# Patient Record
Sex: FEMALE | Race: WHITE | NOT HISPANIC OR LATINO | Employment: UNEMPLOYED | URBAN - NONMETROPOLITAN AREA
[De-identification: names, ages, dates, MRNs, and addresses within clinical notes are randomized per-mention and may not be internally consistent; named-entity substitution may affect disease eponyms.]

---

## 2018-04-27 ENCOUNTER — TRANSCRIBE ORDERS (OUTPATIENT)
Dept: ADMINISTRATIVE | Facility: HOSPITAL | Age: 58
End: 2018-04-27

## 2018-04-27 DIAGNOSIS — Z12.31 ENCOUNTER FOR SCREENING MAMMOGRAM FOR MALIGNANT NEOPLASM OF BREAST: Primary | ICD-10-CM

## 2018-04-30 ENCOUNTER — HOSPITAL ENCOUNTER (OUTPATIENT)
Dept: MAMMOGRAPHY | Facility: HOSPITAL | Age: 58
Discharge: HOME | End: 2018-04-30
Admitting: PHYSICIAN ASSISTANT

## 2018-04-30 DIAGNOSIS — Z12.31 ENCOUNTER FOR SCREENING MAMMOGRAM FOR MALIGNANT NEOPLASM OF BREAST: ICD-10-CM

## 2018-04-30 PROCEDURE — 77063 BREAST TOMOSYNTHESIS BI: CPT

## 2018-04-30 PROCEDURE — 77067 SCR MAMMO BI INCL CAD: CPT

## 2020-02-28 ENCOUNTER — OFFICE VISIT (OUTPATIENT)
Dept: OBSTETRICS AND GYNECOLOGY | Facility: CLINIC | Age: 60
End: 2020-02-28

## 2020-02-28 VITALS
WEIGHT: 202 LBS | HEIGHT: 68 IN | SYSTOLIC BLOOD PRESSURE: 118 MMHG | DIASTOLIC BLOOD PRESSURE: 76 MMHG | BODY MASS INDEX: 30.62 KG/M2

## 2020-02-28 DIAGNOSIS — N81.89 PELVIC FLOOR RELAXATION: ICD-10-CM

## 2020-02-28 DIAGNOSIS — Z12.39 ENCOUNTER FOR SCREENING FOR MALIGNANT NEOPLASM OF BREAST: ICD-10-CM

## 2020-02-28 DIAGNOSIS — Z01.419 WELL WOMAN EXAM WITH ROUTINE GYNECOLOGICAL EXAM: Primary | ICD-10-CM

## 2020-02-28 DIAGNOSIS — N95.1 MENOPAUSAL STATE: ICD-10-CM

## 2020-02-28 PROCEDURE — 99386 PREV VISIT NEW AGE 40-64: CPT | Performed by: NURSE PRACTITIONER

## 2020-02-28 RX ORDER — ESTROGENS, CONJUGATED 0.45 MG/1
TABLET, FILM COATED ORAL
COMMUNITY
Start: 2019-12-12 | End: 2020-02-28 | Stop reason: SDUPTHER

## 2020-02-28 RX ORDER — DULOXETIN HYDROCHLORIDE 60 MG/1
CAPSULE, DELAYED RELEASE ORAL
COMMUNITY
Start: 2020-01-07

## 2020-02-28 RX ORDER — MELOXICAM 15 MG/1
TABLET ORAL
COMMUNITY
Start: 2019-12-11 | End: 2022-03-21

## 2020-02-28 RX ORDER — ESTROGENS, CONJUGATED 0.45 MG/1
0.45 TABLET, FILM COATED ORAL DAILY
Qty: 90 TABLET | Refills: 3 | Status: SHIPPED | OUTPATIENT
Start: 2020-02-28 | End: 2022-03-21

## 2020-02-28 RX ORDER — ROPINIROLE 1 MG/1
TABLET, FILM COATED ORAL
COMMUNITY
Start: 2019-12-11

## 2020-02-28 NOTE — PROGRESS NOTES
Subjective   Emili Schmitt is a 59 y.o. female  YOB: 1960        Chief Complaint   Patient presents with   • Gynecologic Exam     New patient here for yearly exam. Last yearly was done about 10 years ago. Patient had a TVH, unsure on if she has her tubes, due to hemmoraging after delivary. Last mammo was done at [facility] on 04/30/18 and was normal. Patient needs order sent to [facility]. Patient takes Premarin 0.45mg and does well with it, needs refills to her pharmacy. Patient has c/o of possible prolapse.        Gynecologic Exam   The patient's pertinent negatives include no pelvic pain. Pertinent negatives include no abdominal pain, back pain, constipation, diarrhea, dysuria, fever, frequency, hematuria, nausea, rash, sore throat, urgency or vomiting.       The following portions of the patient's history were reviewed and updated as appropriate: allergies, current medications, past family history, past medical history, past social history, past surgical history and problem list.    No Known Allergies    Past Medical History:   Diagnosis Date   • Arthritis    • Interstitial cystitis    • Macular dystrophy    • Neuropathy    • Restless leg syndrome        Family History   Problem Relation Age of Onset   • Breast cancer Neg Hx    • Ovarian cancer Neg Hx    • Uterine cancer Neg Hx    • Colon cancer Neg Hx    • Melanoma Neg Hx        Social History     Socioeconomic History   • Marital status:      Spouse name: Not on file   • Number of children: Not on file   • Years of education: Not on file   • Highest education level: Not on file   Tobacco Use   • Smoking status: Current Every Day Smoker     Packs/day: 0.50     Types: Cigarettes   • Smokeless tobacco: Never Used   Substance and Sexual Activity   • Alcohol use: Never     Frequency: Never   • Drug use: Never   • Sexual activity: Yes     Partners: Male     Birth control/protection: Surgical         Current Outpatient Medications:   •  DULoxetine  (CYMBALTA) 60 MG capsule, , Disp: , Rfl:   •  meloxicam (MOBIC) 15 MG tablet, , Disp: , Rfl:   •  PREMARIN 0.45 MG tablet, Take 1 tablet by mouth Daily., Disp: 90 tablet, Rfl: 3  •  rOPINIRole (REQUIP) 1 MG tablet, , Disp: , Rfl:     No LMP recorded. Patient has had a hysterectomy.    Sexual History:         Could not be calculated    Past Surgical History:   Procedure Laterality Date   • APPENDECTOMY     • BREAST CYST ASPIRATION Left    • HERNIA REPAIR     • HYSTERECTOMY      TVH unsure if she has tubes due to hemmorage after birth   • OTHER SURGICAL HISTORY      TIF surgery       Review of Systems   Constitutional: Negative for activity change, appetite change, fatigue, fever, unexpected weight gain and unexpected weight loss.   HENT: Negative for congestion, ear pain, hearing loss, nosebleeds, rhinorrhea, sore throat, tinnitus and trouble swallowing.    Eyes: Negative for blurred vision, pain, discharge, itching and visual disturbance.   Respiratory: Negative for apnea, chest tightness, shortness of breath and wheezing.    Cardiovascular: Negative for chest pain and leg swelling.   Gastrointestinal: Negative for abdominal pain, blood in stool, constipation, diarrhea, nausea, vomiting and GERD.   Endocrine: Negative for heat intolerance, polydipsia and polyuria.   Genitourinary: Negative for breast lump, decreased libido, difficulty urinating, dyspareunia, dysuria, frequency, genital sores, hematuria, menstrual problem, pelvic pain, urgency, urinary incontinence and vaginal pain.   Musculoskeletal: Negative for arthralgias, back pain, joint swelling and myalgias.   Skin: Negative for color change, rash and skin lesions.   Allergic/Immunologic: Negative for environmental allergies, food allergies and immunocompromised state.   Neurological: Negative for dizziness, tremors, seizures, syncope, facial asymmetry, numbness and headache.   Hematological: Negative for adenopathy. Does not bruise/bleed easily.    Psychiatric/Behavioral: Negative for agitation, hallucinations, sleep disturbance, suicidal ideas and depressed mood. The patient is not nervous/anxious.        Objective   Physical Exam   Constitutional: She is oriented to person, place, and time. She appears well-developed and well-nourished.   HENT:   Head: Normocephalic and atraumatic.   Right Ear: External ear normal.   Left Ear: External ear normal.   Eyes: Conjunctivae and EOM are normal. Right eye exhibits no discharge. Left eye exhibits no discharge. No scleral icterus.   Neck: Normal range of motion. Neck supple. Carotid bruit is not present. No thyromegaly present.   Cardiovascular: Regular rhythm and normal heart sounds.   No murmur heard.  Pulmonary/Chest: Effort normal and breath sounds normal. No respiratory distress. Right breast exhibits no inverted nipple, no mass, no nipple discharge, no skin change and no tenderness. Left breast exhibits no inverted nipple, no mass, no nipple discharge, no skin change and no tenderness. No breast swelling, tenderness, discharge or bleeding. Breasts are symmetrical.   Abdominal: Soft. Bowel sounds are normal. She exhibits no distension and no mass. There is no tenderness. There is no guarding. No hernia. Hernia confirmed negative in the right inguinal area and confirmed negative in the left inguinal area.   Genitourinary: Vagina normal. Rectal exam shows no mass. No breast swelling, tenderness, discharge or bleeding. There is no rash, tenderness, lesion or injury on the right labia. There is no rash, tenderness, lesion or injury on the left labia. No erythema or bleeding in the vagina. No signs of injury around the vagina. No vaginal discharge found.   Genitourinary Comments: Cervix, Uterus and Adnexa are surgically absent.  Urethra and urethral meatus normal  Bladder, normal no prolapse  Perineum and anus examined and without lesions   Musculoskeletal: Normal range of motion. She exhibits no edema or  "tenderness.   Lymphadenopathy:        Head (right side): No submental, no submandibular, no tonsillar, no preauricular, no posterior auricular and no occipital adenopathy present.        Head (left side): No submental, no submandibular, no tonsillar, no preauricular, no posterior auricular and no occipital adenopathy present.     She has no cervical adenopathy.        Right cervical: No superficial cervical, no deep cervical and no posterior cervical adenopathy present.       Left cervical: No superficial cervical, no deep cervical and no posterior cervical adenopathy present.     She has no axillary adenopathy.        Right: No inguinal adenopathy present.        Left: No inguinal adenopathy present.   Neurological: She is alert and oriented to person, place, and time. She exhibits normal muscle tone. Coordination normal.   Skin: Skin is warm and dry. No bruising and no rash noted. No erythema.   Psychiatric: She has a normal mood and affect. Her behavior is normal. Judgment and thought content normal.   Nursing note and vitals reviewed.        Vitals:    02/28/20 0854   BP: 118/76   Weight: 91.6 kg (202 lb)   Height: 172.7 cm (68\")       Emili was seen today for gynecologic exam.    Diagnoses and all orders for this visit:    Well woman exam with routine gynecological exam  Comments:  Normal well woman exam.  Mammogram ordered.    Encounter for screening for malignant neoplasm of breast  Comments:  Mammogram ordered.  Orders:  -     Mammo Screening Digital Tomosynthesis Bilateral With CAD    Menopausal state  Comments:  Patient does well on Premarin 0.45 mg daily and wants to remain on it.  Refill sent to pharmacy.  Orders:  -     PREMARIN 0.45 MG tablet; Take 1 tablet by mouth Daily.    Pelvic floor relaxation  Comments:  Patient reports she thinks she might be experiencing prolapse.  History of TVH.  No significant prolapse but did discuss ways to strengthen pelvic floor.  Discussed vaginal weighted " balls-patient wants to try.  Thoroughly discussed use.  RTO for yearly or sooner as needed.          Patient's Body mass index is 30.71 kg/m². BMI is above normal parameters. Recommendations include: exercise counseling and nutrition counseling.             Non-Smoker    MyChart Instructions Given

## 2022-03-18 DIAGNOSIS — M25.552 LEFT HIP PAIN: Primary | ICD-10-CM

## 2022-03-21 ENCOUNTER — OFFICE VISIT (OUTPATIENT)
Dept: ORTHOPEDIC SURGERY | Facility: CLINIC | Age: 62
End: 2022-03-21

## 2022-03-21 VITALS — BODY MASS INDEX: 31.98 KG/M2 | HEIGHT: 68 IN | WEIGHT: 211 LBS

## 2022-03-21 DIAGNOSIS — M70.62 TROCHANTERIC BURSITIS OF LEFT HIP: ICD-10-CM

## 2022-03-21 DIAGNOSIS — R26.9 GAIT DIFFICULTY: ICD-10-CM

## 2022-03-21 DIAGNOSIS — M25.552 LEFT HIP PAIN: Primary | ICD-10-CM

## 2022-03-21 DIAGNOSIS — M54.32 SCIATICA OF LEFT SIDE: ICD-10-CM
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
## 2022-03-21 DIAGNOSIS — Z78.0 POST-MENOPAUSAL: ICD-10-CM

## 2022-03-21 DIAGNOSIS — Z13.820 OSTEOPOROSIS SCREENING: ICD-10-CM

## 2022-03-21 PROCEDURE — 20610 DRAIN/INJ JOINT/BURSA W/O US: CPT | Performed by: NURSE PRACTITIONER

## 2022-03-21 PROCEDURE — 99214 OFFICE O/P EST MOD 30 MIN: CPT | Performed by: NURSE PRACTITIONER

## 2022-03-21 PROCEDURE — 96372 THER/PROPH/DIAG INJ SC/IM: CPT | Performed by: NURSE PRACTITIONER

## 2022-03-21 RX ORDER — KETOROLAC TROMETHAMINE 30 MG/ML
60 INJECTION, SOLUTION INTRAMUSCULAR; INTRAVENOUS ONCE
Status: COMPLETED | OUTPATIENT
Start: 2022-03-21 | End: 2022-03-21

## 2022-03-21 RX ORDER — METOPROLOL TARTRATE 50 MG/1
50 TABLET, FILM COATED ORAL 2 TIMES DAILY
COMMUNITY

## 2022-03-21 RX ORDER — DICLOFENAC SODIUM 75 MG/1
75 TABLET, DELAYED RELEASE ORAL 2 TIMES DAILY
Qty: 60 TABLET | Refills: 1 | Status: SHIPPED | OUTPATIENT
Start: 2022-03-21

## 2022-03-21 RX ORDER — ESCITALOPRAM OXALATE 10 MG/1
10 TABLET ORAL DAILY
COMMUNITY

## 2022-03-21 RX ADMIN — LIDOCAINE HYDROCHLORIDE 2 ML: 10 INJECTION, SOLUTION EPIDURAL; INFILTRATION; INTRACAUDAL; PERINEURAL at 09:42

## 2022-03-21 RX ADMIN — TRIAMCINOLONE ACETONIDE 40 MG: 40 INJECTION, SUSPENSION INTRA-ARTICULAR; INTRAMUSCULAR at 09:42

## 2022-03-21 RX ADMIN — KETOROLAC TROMETHAMINE 60 MG: 30 INJECTION, SOLUTION INTRAMUSCULAR; INTRAVENOUS at 09:43

## 2022-03-21 NOTE — PROGRESS NOTES
Emili Schmitt is a 61 y.o. female   Primary provider:  Remedios Qiu APRN       Chief Complaint   Patient presents with   • Left Hip - Hip Pain       HISTORY OF PRESENT ILLNESS: This 61-year-old female patient presents today for left hip pain.  The patient reports she fell over her coat on 3/6/2022 landing on her left side.  The patient reports her pain starts in her left groin and radiates around to the back of her groin.  The patient reports her pain is worse with internal rotation.  The patient reports her pain has not gotten any better since falling approximately 2 weeks ago.  She also reports her pain is in her left glutes and radiates down her left leg.  The patient describes her pain is moderate, constant, grinding and aching.  The pain is worse with sitting, walking and rolling over in her sleep.  Patient reports her pain is interrupting her sleep and daily activities.  The patient denies numbness or tingling.  Patient denies a history of back pain.    Hip Pain   Incident onset: 3/6/2022. The injury mechanism was a fall. The pain is present in the left hip. The quality of the pain is described as aching (grinding. ). The pain is at a severity of 4/10. The pain is moderate. The pain has been worsening since onset. Exacerbated by: sitting, walking.  Treatments tried: xrays done today.         CONCURRENT MEDICAL HISTORY:    Past Medical History:   Diagnosis Date   • Arthritis    • Interstitial cystitis    • Macular dystrophy    • Neuropathy    • Restless leg syndrome        No Known Allergies      Current Outpatient Medications:   •  DULoxetine (CYMBALTA) 60 MG capsule, , Disp: , Rfl:   •  escitalopram (LEXAPRO) 10 MG tablet, Take 10 mg by mouth Daily., Disp: , Rfl:   •  metoprolol tartrate (LOPRESSOR) 50 MG tablet, Take 50 mg by mouth 2 (Two) Times a Day., Disp: , Rfl:   •  rOPINIRole (REQUIP) 1 MG tablet, , Disp: , Rfl:   •  diclofenac (VOLTAREN) 75 MG EC tablet, Take 1 tablet by mouth 2 (Two)  "Times a Day., Disp: 60 tablet, Rfl: 1    Past Surgical History:   Procedure Laterality Date   • APPENDECTOMY     • BREAST CYST ASPIRATION Left    • HERNIA REPAIR     • HYSTERECTOMY      TVH unsure if she has tubes due to hemmorage after birth   • OTHER SURGICAL HISTORY      TIF surgery       Family History   Problem Relation Age of Onset   • Breast cancer Neg Hx    • Ovarian cancer Neg Hx    • Uterine cancer Neg Hx    • Colon cancer Neg Hx    • Melanoma Neg Hx        Social History     Socioeconomic History   • Marital status:    Tobacco Use   • Smoking status: Current Every Day Smoker     Packs/day: 1.00     Types: Cigarettes   • Smokeless tobacco: Never Used   Substance and Sexual Activity   • Alcohol use: Never   • Drug use: Never   • Sexual activity: Yes     Partners: Male     Birth control/protection: Surgical        Review of Systems   Constitutional: Negative.    HENT: Negative.    Eyes: Negative.    Respiratory: Negative.    Cardiovascular: Negative.    Gastrointestinal: Negative.    Endocrine: Negative.    Genitourinary: Negative.    Musculoskeletal: Negative.    Skin: Negative.    Allergic/Immunologic: Negative.    Neurological: Negative.    Hematological: Negative.    Psychiatric/Behavioral: Negative.        PHYSICAL EXAMINATION:       Ht 172.7 cm (68\")   Wt 95.7 kg (211 lb)   BMI 32.08 kg/m²     Physical Exam    GAIT:     []  Normal  []  Antalgic    Assistive device: [x]  None  []  Walker     []  Crutches  []  Cane     []  Wheelchair  []  Stretcher    Left Hip Exam     Tenderness   The patient is experiencing tenderness in the greater trochanter, anterior, posterior and lateral.    Muscle Strength   Abduction: 4/5   Adduction: 4/5   Flexion: 4/5     Tests   ELISHA: negative  Fadir:  Positive FADIR test    Other   Erythema: absent  Scars: absent  Sensation: normal  Pulse: present    Comments:  Decent range of motion however pain is elicited with internal and external rotation.  More with internal " "rotation.  The patient reports she \"feels it \"with external rotation.  Pinpoint tenderness noted at the greater trochanteric area.  Mild swelling at this area as well.  No obvious deformity or leg discrepancy.      Back Exam     Tenderness   The patient is experiencing no tenderness (left glute).     Range of Motion   Extension: normal   Flexion: normal     Reflexes   Patellar: normal    Other   Gait: antalgic   Erythema: no back redness  Scars: absent                  XR Spine Lumbar 2 or 3 View    Result Date: 3/21/2022  Narrative: INDICATION: fall, pain, M25.552 Pain in left hip W01.0XXA Sciatica, left side EXAMINATION/TECHNIQUE: X-RAY -  XR LUMBAR SPINE 2-3 VIEWS COMPARISON: None. ____________________________________________ FINDINGS:  VERTEBRAE: Vertebral body heights are normal.  There is no evidence of fracture.  Alignment is normal.  There is no evidence of spondylolisthesis.  DISCS AND FACET JOINTS: There is mild multilevel degenerative disc disease with marginal osteophytes. There is moderate facet arthropathy at L4-5 and L5-S1. INCLUDED ABDOMEN: Included bowel gas pattern is non-obstructive.     Impression: Mild degenerative changes with normal alignment and no evidence of fracture Electronically signed by:  Preet Eagle MD  3/21/2022 4:35 PM CDT Workstation: [id number]    XR Hip With or Without Pelvis 2 - 3 View Left    Result Date: 3/21/2022  Narrative: Comparison: None Indication: Left hip pain Findings: AP view the pelvis and two views the left hip are obtained. There is normal alignment. No fracture or dislocation. Mild joint space narrowing at the hip.     Impression: Impression: Left hip osteoarthritis Electronically signed by:  Manny Worley MD  3/21/2022 10:22 AM CDT Workstation: [id number]          ASSESSMENT:    Diagnoses and all orders for this visit:    Left hip pain  -     Miscellaneous DME  -     XR Spine Lumbar 2 or 3 View  -     Large Joint Arthrocentesis: L greater trochanteric bursa  -     " ketorolac (TORADOL) injection 60 mg  -     diclofenac (VOLTAREN) 75 MG EC tablet; Take 1 tablet by mouth 2 (Two) Times a Day.  -     Ambulatory Referral to Physical Therapy Evaluate and treat    Trochanteric bursitis of left hip  -     Large Joint Arthrocentesis: L greater trochanteric bursa  -     ketorolac (TORADOL) injection 60 mg  -     diclofenac (VOLTAREN) 75 MG EC tablet; Take 1 tablet by mouth 2 (Two) Times a Day.  -     Ambulatory Referral to Physical Therapy Evaluate and treat    Sciatica of left side  -     Miscellaneous DME  -     XR Spine Lumbar 2 or 3 View  -     ketorolac (TORADOL) injection 60 mg  -     diclofenac (VOLTAREN) 75 MG EC tablet; Take 1 tablet by mouth 2 (Two) Times a Day.  -     Ambulatory Referral to Physical Therapy Evaluate and treat    Gait difficulty  -     Miscellaneous DME  -     XR Spine Lumbar 2 or 3 View  -     ketorolac (TORADOL) injection 60 mg  -     diclofenac (VOLTAREN) 75 MG EC tablet; Take 1 tablet by mouth 2 (Two) Times a Day.  -     Ambulatory Referral to Physical Therapy Evaluate and treat    Fall from slip, trip, or stumble, initial encounter  -     Miscellaneous DME  -     XR Spine Lumbar 2 or 3 View  -     Ambulatory Referral to Physical Therapy Evaluate and treat    Post-menopausal  -     DEXA Bone Density Axial; Future    Osteoporosis screening  -     DEXA Bone Density Axial; Future    Other orders  -     escitalopram (LEXAPRO) 10 MG tablet; Take 10 mg by mouth Daily.  -     metoprolol tartrate (LOPRESSOR) 50 MG tablet; Take 50 mg by mouth 2 (Two) Times a Day.          PLAN  Large Joint Arthrocentesis: L greater trochanteric bursa  Date/Time: 3/21/2022 9:42 AM  Consent given by: patient  Timeout: Immediately prior to procedure a time out was called to verify the correct patient, procedure, equipment, support staff and site/side marked as required   Supporting Documentation  Indications: pain   Procedure Details  Location: hip - L greater trochanteric  bursa  Needle size: 22 G  Medications administered: 40 mg triamcinolone acetonide 40 MG/ML; 2 mL lidocaine PF 1% 1 %          Reviewed and discussed preliminary results of the left hip and lumbar spine xray. Will notify patient of any new findings we did not discuss that is reported by radiology. Reviewed risk and benefits of a Toradol IM injection, the patient is agreeable to the medication.    Reviewed risk and benefits of a bursa injection to the left greater trochanter.  The patient is agreeable to the kenalog/lidocaine bursa injection.   Provided patient with home exercises and a Thera-Band.  PT referral sent.  Will order MRI if pain does not improve.   Will discuss IA hip injection next visit if pain persist.     Fall from slip, trip, or stumble, initial encounter  -     Miscellaneous DME: Walker  -     XR Spine Lumbar 2 or 3 View  -     Ambulatory Referral to Physical Therapy Evaluate and treat    Post-menopausal, Osteoporosis screening  -     DEXA Bone Density Axial; Future  -  Make follow up appointment with [facility] to discuss results.     Left hip pain  -     Miscellaneous DME: Walker  -     XR Spine Lumbar 2 or 3 View  -     left greater trochanteric injection of Kenalog and lidocaine administered, patient tolerated well with no immediate reaction.  -     ketorolac (TORADOL) injection 60 mg administered, patient tolerated well with no immediate reaction.  -     diclofenac (VOLTAREN) 75 MG EC tablet; Take 1 tablet by mouth 2 (Two) Times a Day.  - Recommend starting a prescription oral NSAID for improved control of pain/inflammation. Meloxicam is prescribed today. Patient is instructed to take the  medication daily. Patient is instructed to take the medication with food to minimize any potential GI upset. Patient is instructed not to take any additional NSAIDs,  such as Ibuprofen or Aleve, while taking Meloxicam. Patient can also take Tylenol as needed for additional pain control.   -      Ambulatory Referral to Physical Therapy Evaluate and treat    Trochanteric bursitis of left hip  -     left greater trochanteric injection of Kenalog and lidocaine administered, patient tolerated well with no immediate reaction.  -     ketorolac (TORADOL) injection 60 mg  -     diclofenac (VOLTAREN) 75 MG EC tablet; Take 1 tablet by mouth 2 (Two) Times a Day. Discussed NSAID use.   -     Ambulatory Referral to Physical Therapy Evaluate and treat    Sciatica of left side  -     Miscellaneous DME: Walker  -     XR Spine Lumbar 2 or 3 View  -     ketorolac (TORADOL) injection 60 mg administered, patient tolerated well with no immediate reaction.  -     diclofenac (VOLTAREN) 75 MG EC tablet; Take 1 tablet by mouth 2 (Two) Times a Day.  -     Ambulatory Referral to Physical Therapy Evaluate and treat    Gait difficulty  -     Miscellaneous DME: Walker.  -     Ambulatory Referral to Physical Therapy Evaluate and treat    Recommend to follow up in 4-5 weeks after PT as started and she has completed a least 3 weeks of PT. Unless symptoms worsen or change, follow up sooner.   All questions and concerns are addressed with understanding noted. They are aware and are in agreement to this plan.    Return in about 4 weeks (around 4/18/2022) for Recheck.    BHAVESH Barger     This document has been electronically signed by BHAVESH Barger on March 23, 2022 12:13 CDT      EMR Dragon/Transciption Disclaimer: Some of this note may be an electronic transcription/translation of spoken language to printed text.  The electronic translation of spoken language may permit erroneous, or at times, nonsensical words or phrases to be inadvertently transcribed. Although I have reviewed the note for such errors, some may still exist.     Time spent of a minimum of 30 minutes including the face to face evaluation, reviewing of medical history and prior medial records, reviewing of diagnostic studies, ordering additional tests,  documentation, patient education and coordination of care.

## 2022-03-23 ENCOUNTER — TELEPHONE (OUTPATIENT)
Dept: ORTHOPEDIC SURGERY | Facility: CLINIC | Age: 62
End: 2022-03-23

## 2022-03-23 RX ORDER — TRIAMCINOLONE ACETONIDE 40 MG/ML
40 INJECTION, SUSPENSION INTRA-ARTICULAR; INTRAMUSCULAR
Status: COMPLETED | OUTPATIENT
Start: 2022-03-21 | End: 2022-03-21

## 2022-03-23 RX ORDER — LIDOCAINE HYDROCHLORIDE 10 MG/ML
2 INJECTION, SOLUTION EPIDURAL; INFILTRATION; INTRACAUDAL; PERINEURAL
Status: COMPLETED | OUTPATIENT
Start: 2022-03-21 | End: 2022-03-21

## 2022-03-23 NOTE — TELEPHONE ENCOUNTER
----- Message from BHAVESH Barger sent at 3/23/2022  9:14 AM CDT -----  Please notify the patient she has some mild degenerative changes in her lumbar spine, with normal alignment and no fractures.   Continue with our plan to participate in PT the next few weeks and follow up with me in about 4 weeks or sooner as needed if symptoms worsen or change.     Thanks.

## 2022-03-23 NOTE — PROGRESS NOTES
Please notify the patient she has some mild degenerative changes in her lumbar spine, with normal alignment and no fractures.   Continue with our plan to participate in PT the next few weeks and follow up with me in about 4 weeks or sooner as needed if symptoms worsen or change.     Thanks.

## 2022-03-31 ENCOUNTER — TELEPHONE (OUTPATIENT)
Dept: ORTHOPEDIC SURGERY | Facility: CLINIC | Age: 62
End: 2022-03-31

## 2022-08-05 ENCOUNTER — HOSPITAL ENCOUNTER (EMERGENCY)
Facility: HOSPITAL | Age: 62
Discharge: HOME | End: 2022-08-05
Admitting: EMERGENCY MEDICINE

## 2022-08-05 VITALS
HEART RATE: 69 BPM | DIASTOLIC BLOOD PRESSURE: 59 MMHG | OXYGEN SATURATION: 99 % | BODY MASS INDEX: 31.93 KG/M2 | WEIGHT: 210 LBS | SYSTOLIC BLOOD PRESSURE: 139 MMHG | TEMPERATURE: 98.7 F | RESPIRATION RATE: 18 BRPM

## 2022-08-05 DIAGNOSIS — H54.61 VISION LOSS OF RIGHT EYE: Primary | ICD-10-CM

## 2022-08-05 PROCEDURE — 99282 EMERGENCY DEPT VISIT SF MDM: CPT

## 2022-08-05 NOTE — ED PROVIDER NOTES
Subjective   61-year-old female presents to the ED with complaint of painful right monocular vision loss.  She has a history of macular dystrophy.  Patient reports onset of vision change around 11 AM, approximately 6 hours prior to arrival.  Patient states she developed a speckled appearance in her central visual field.  She also reports a cobweb type appearance in the lateral aspect of her right eye.  No left abnormalities.  She does endorse pain and photophobia.  No chest pain, shortness of breath, slurred speech, facial droop.  Has a retina specialist but cannot recall his name.      History provided by:  Patient      Review of Systems   All other systems reviewed and are negative.      Past Medical History:   Diagnosis Date   • Arthritis    • Interstitial cystitis    • Macular dystrophy    • Neuropathy    • Restless leg syndrome        No Known Allergies    Past Surgical History:   Procedure Laterality Date   • APPENDECTOMY     • BREAST CYST ASPIRATION Left    • HERNIA REPAIR     • HYSTERECTOMY      [facility] unsure if she has tubes due to hemmorage after birth   • OTHER SURGICAL HISTORY      TIF surgery       Family History   Problem Relation Age of Onset   • Breast cancer Neg Hx    • Ovarian cancer Neg Hx    • Uterine cancer Neg Hx    • Colon cancer Neg Hx    • Melanoma Neg Hx        Social History     Socioeconomic History   • Marital status:    Tobacco Use   • Smoking status: Current Every Day Smoker     Packs/day: 1.00     Types: Cigarettes   • Smokeless tobacco: Never Used   Substance and Sexual Activity   • Alcohol use: Never   • Drug use: Never   • Sexual activity: Yes     Partners: Male     Birth control/protection: Surgical           Objective   Physical Exam  Vitals and nursing note reviewed.   Constitutional:       Appearance: Normal appearance. She is normal weight.   HENT:      Head: Normocephalic and atraumatic.   Eyes:      Extraocular Movements: Extraocular movements intact.       Conjunctiva/sclera: Conjunctivae normal.      Pupils: Pupils are equal, round, and reactive to light.      Comments: No pupillary defects  Visual acuity 2125 right eye, 20/100 left eye, 20/100 both eyes   Skin:     General: Skin is warm and dry.      Capillary Refill: Capillary refill takes less than 2 seconds.   Neurological:      Mental Status: She is alert and oriented to person, place, and time.      Cranial Nerves: No cranial nerve deficit.      Sensory: No sensory deficit.         Procedures           ED Course  ED Course as of 08/05/22 1714   Fri Aug 05, 2022   1711 69-year-old female with mildly painful monocular vision loss right eye.  Does have a history of macular dystrophy.  Onset of symptoms about 6 hours prior to arrival.  She states her vision is getting worse which prompted visit to the ER.    I discussed the case with Dr. Warner, he recommend we discharge her and send her to his office so she can have a retinal exam immediately.  He can always send her back to the ER if he discover some type of branch vessel occlusion.    Otherwise, no unilateral focal neurologic deficits.   [AW]      ED Course User Index  [AW] Rakesh Brothers MD                                           [facility]    Final diagnoses:   Vision loss of right eye       ED Disposition  ED Disposition     ED Disposition   Discharge    Condition   Stable    Comment   --             Brian Warner MD  [address] [place] [address]  [phone]    Today           Medication List      No changes were made to your prescriptions during this visit.          Rakesh Brothers MD  08/05/22 1714

## 2022-08-05 NOTE — ED NOTES
Pt at triage desk states that pcp sent her to see opthomolist at ED.  Nba RN notified Dr. Brothers.  Pt seen at triage stretcher per Dr. Brothers.

## 2023-08-21 ENCOUNTER — HOSPITAL ENCOUNTER (OUTPATIENT)
Facility: HOSPITAL | Age: 63
Discharge: OTHER INSTITUTION | End: 2023-09-13
Attending: INTERNAL MEDICINE | Admitting: INTERNAL MEDICINE
Payer: COMMERCIAL

## 2023-08-21 ENCOUNTER — APPOINTMENT (OUTPATIENT)
Dept: GENERAL RADIOLOGY | Facility: HOSPITAL | Age: 63
End: 2023-08-21
Payer: COMMERCIAL

## 2023-08-21 LAB — GLUCOSE BLDC GLUCOMTR-MCNC: 156 MG/DL (ref 70–130)

## 2023-08-21 PROCEDURE — 63710000001 INSULIN LISPRO (HUMAN) PER 5 UNITS: Performed by: INTERNAL MEDICINE

## 2023-08-21 PROCEDURE — 82948 REAGENT STRIP/BLOOD GLUCOSE: CPT

## 2023-08-21 PROCEDURE — 71045 X-RAY EXAM CHEST 1 VIEW: CPT

## 2023-08-21 RX ORDER — POLYETHYLENE GLYCOL 3350 17 G/17G
17 POWDER, FOR SOLUTION ORAL 2 TIMES DAILY
Status: DISCONTINUED | OUTPATIENT
Start: 2023-08-21 | End: 2023-09-13 | Stop reason: HOSPADM

## 2023-08-21 RX ORDER — GABAPENTIN 100 MG/1
200 CAPSULE ORAL 3 TIMES DAILY
Status: DISCONTINUED | OUTPATIENT
Start: 2023-08-21 | End: 2023-09-13 | Stop reason: HOSPADM

## 2023-08-21 RX ORDER — INSULIN LISPRO 100 [IU]/ML
2-7 INJECTION, SOLUTION INTRAVENOUS; SUBCUTANEOUS
Status: DISCONTINUED | OUTPATIENT
Start: 2023-08-21 | End: 2023-08-23

## 2023-08-21 RX ORDER — METOPROLOL SUCCINATE 50 MG/1
50 TABLET, EXTENDED RELEASE ORAL
Status: DISCONTINUED | OUTPATIENT
Start: 2023-08-22 | End: 2023-09-13 | Stop reason: HOSPADM

## 2023-08-21 RX ORDER — NICOTINE POLACRILEX 4 MG
15 LOZENGE BUCCAL
Status: DISCONTINUED | OUTPATIENT
Start: 2023-08-21 | End: 2023-08-23

## 2023-08-21 RX ORDER — PANTOPRAZOLE SODIUM 40 MG/1
40 TABLET, DELAYED RELEASE ORAL 2 TIMES DAILY
Status: DISCONTINUED | OUTPATIENT
Start: 2023-08-21 | End: 2023-09-13 | Stop reason: HOSPADM

## 2023-08-21 RX ORDER — IPRATROPIUM BROMIDE AND ALBUTEROL SULFATE 2.5; .5 MG/3ML; MG/3ML
3 SOLUTION RESPIRATORY (INHALATION)
Status: DISCONTINUED | OUTPATIENT
Start: 2023-08-21 | End: 2023-09-06

## 2023-08-21 RX ORDER — AMIODARONE HYDROCHLORIDE 200 MG/1
200 TABLET ORAL
Status: DISCONTINUED | OUTPATIENT
Start: 2023-08-22 | End: 2023-09-13 | Stop reason: HOSPADM

## 2023-08-21 RX ORDER — OLANZAPINE 5 MG/1
5 TABLET ORAL 2 TIMES DAILY
Status: DISCONTINUED | OUTPATIENT
Start: 2023-08-21 | End: 2023-08-31

## 2023-08-21 RX ORDER — AMOXICILLIN 250 MG
2 CAPSULE ORAL 2 TIMES DAILY
Status: DISCONTINUED | OUTPATIENT
Start: 2023-08-21 | End: 2023-09-13 | Stop reason: HOSPADM

## 2023-08-21 RX ORDER — HYDRALAZINE HYDROCHLORIDE 20 MG/ML
5 INJECTION INTRAMUSCULAR; INTRAVENOUS EVERY 4 HOURS PRN
Status: DISCONTINUED | OUTPATIENT
Start: 2023-08-21 | End: 2023-09-13 | Stop reason: HOSPADM

## 2023-08-21 RX ORDER — DEXTROSE MONOHYDRATE 25 G/50ML
25 INJECTION, SOLUTION INTRAVENOUS
Status: DISCONTINUED | OUTPATIENT
Start: 2023-08-21 | End: 2023-08-23

## 2023-08-21 RX ORDER — LIDOCAINE 50 MG/G
2 PATCH TOPICAL
Status: DISCONTINUED | OUTPATIENT
Start: 2023-08-22 | End: 2023-09-13 | Stop reason: HOSPADM

## 2023-08-21 RX ORDER — OXYCODONE HYDROCHLORIDE 5 MG/1
5 TABLET ORAL EVERY 6 HOURS PRN
Status: DISPENSED | OUTPATIENT
Start: 2023-08-21 | End: 2023-08-28

## 2023-08-21 RX ORDER — BUSPIRONE HYDROCHLORIDE 5 MG/1
5 TABLET ORAL
Status: DISCONTINUED | OUTPATIENT
Start: 2023-08-21 | End: 2023-09-13 | Stop reason: HOSPADM

## 2023-08-21 RX ORDER — ONDANSETRON 4 MG/1
4 TABLET, FILM COATED ORAL EVERY 6 HOURS PRN
Status: DISCONTINUED | OUTPATIENT
Start: 2023-08-21 | End: 2023-09-13 | Stop reason: HOSPADM

## 2023-08-21 RX ORDER — DULOXETIN HYDROCHLORIDE 60 MG/1
60 CAPSULE, DELAYED RELEASE ORAL DAILY
Status: DISCONTINUED | OUTPATIENT
Start: 2023-08-22 | End: 2023-09-13 | Stop reason: HOSPADM

## 2023-08-21 RX ORDER — SIMETHICONE 80 MG
80 TABLET,CHEWABLE ORAL 4 TIMES DAILY
Status: DISCONTINUED | OUTPATIENT
Start: 2023-08-21 | End: 2023-09-13 | Stop reason: HOSPADM

## 2023-08-21 RX ORDER — ONDANSETRON 2 MG/ML
4 INJECTION INTRAMUSCULAR; INTRAVENOUS EVERY 6 HOURS PRN
Status: DISCONTINUED | OUTPATIENT
Start: 2023-08-21 | End: 2023-09-13 | Stop reason: HOSPADM

## 2023-08-21 RX ORDER — BISACODYL 10 MG
10 SUPPOSITORY, RECTAL RECTAL DAILY PRN
Status: DISCONTINUED | OUTPATIENT
Start: 2023-08-21 | End: 2023-09-13 | Stop reason: HOSPADM

## 2023-08-21 RX ORDER — ACETYLCYSTEINE 200 MG/ML
3 SOLUTION ORAL; RESPIRATORY (INHALATION)
Status: DISCONTINUED | OUTPATIENT
Start: 2023-08-21 | End: 2023-08-31

## 2023-08-22 PROBLEM — Z43.0 ACUTE TRACHEOSTOMY MANAGEMENT: Status: ACTIVE | Noted: 2023-08-22

## 2023-08-22 PROBLEM — J96.21 ACUTE ON CHRONIC RESPIRATORY FAILURE WITH HYPOXIA AND HYPERCAPNIA: Status: ACTIVE | Noted: 2023-08-22

## 2023-08-22 PROBLEM — J96.22 ACUTE ON CHRONIC RESPIRATORY FAILURE WITH HYPOXIA AND HYPERCAPNIA: Status: ACTIVE | Noted: 2023-08-22

## 2023-08-22 LAB
ALBUMIN SERPL-MCNC: 3.3 G/DL (ref 3.5–5.2)
ALBUMIN/GLOB SERPL: 1.2 G/DL
ALP SERPL-CCNC: 65 U/L (ref 39–117)
ALT SERPL W P-5'-P-CCNC: 51 U/L (ref 1–33)
ANION GAP SERPL CALCULATED.3IONS-SCNC: 8 MMOL/L (ref 5–15)
AST SERPL-CCNC: 31 U/L (ref 1–32)
BASOPHILS # BLD AUTO: 0.04 10*3/MM3 (ref 0–0.2)
BASOPHILS NFR BLD AUTO: 0.5 % (ref 0–1.5)
BILIRUB SERPL-MCNC: 0.4 MG/DL (ref 0–1.2)
BUN SERPL-MCNC: 13 MG/DL (ref 8–23)
BUN/CREAT SERPL: 46.4 (ref 7–25)
CALCIUM SPEC-SCNC: 10.1 MG/DL (ref 8.6–10.5)
CHLORIDE SERPL-SCNC: 103 MMOL/L (ref 98–107)
CO2 SERPL-SCNC: 27 MMOL/L (ref 22–29)
CREAT SERPL-MCNC: 0.28 MG/DL (ref 0.57–1)
DEPRECATED RDW RBC AUTO: 55.8 FL (ref 37–54)
EGFRCR SERPLBLD CKD-EPI 2021: 122.9 ML/MIN/1.73
EOSINOPHIL # BLD AUTO: 0.19 10*3/MM3 (ref 0–0.4)
EOSINOPHIL NFR BLD AUTO: 2.6 % (ref 0.3–6.2)
ERYTHROCYTE [DISTWIDTH] IN BLOOD BY AUTOMATED COUNT: 15.8 % (ref 12.3–15.4)
GLOBULIN UR ELPH-MCNC: 2.8 GM/DL
GLUCOSE BLDC GLUCOMTR-MCNC: 103 MG/DL (ref 70–130)
GLUCOSE BLDC GLUCOMTR-MCNC: 122 MG/DL (ref 70–130)
GLUCOSE BLDC GLUCOMTR-MCNC: 128 MG/DL (ref 70–130)
GLUCOSE BLDC GLUCOMTR-MCNC: 230 MG/DL (ref 70–130)
GLUCOSE SERPL-MCNC: 113 MG/DL (ref 65–99)
HCT VFR BLD AUTO: 24.7 % (ref 34–46.6)
HGB BLD-MCNC: 7.3 G/DL (ref 12–15.9)
IMM GRANULOCYTES # BLD AUTO: 0.3 10*3/MM3 (ref 0–0.05)
IMM GRANULOCYTES NFR BLD AUTO: 4.1 % (ref 0–0.5)
LYMPHOCYTES # BLD AUTO: 1.51 10*3/MM3 (ref 0.7–3.1)
LYMPHOCYTES NFR BLD AUTO: 20.7 % (ref 19.6–45.3)
MCH RBC QN AUTO: 29.4 PG (ref 26.6–33)
MCHC RBC AUTO-ENTMCNC: 29.6 G/DL (ref 31.5–35.7)
MCV RBC AUTO: 99.6 FL (ref 79–97)
MONOCYTES # BLD AUTO: 0.46 10*3/MM3 (ref 0.1–0.9)
MONOCYTES NFR BLD AUTO: 6.3 % (ref 5–12)
NEUTROPHILS NFR BLD AUTO: 4.8 10*3/MM3 (ref 1.7–7)
NEUTROPHILS NFR BLD AUTO: 65.8 % (ref 42.7–76)
NRBC BLD AUTO-RTO: 0 /100 WBC (ref 0–0.2)
PLATELET # BLD AUTO: 341 10*3/MM3 (ref 140–450)
PMV BLD AUTO: 10.2 FL (ref 6–12)
POTASSIUM SERPL-SCNC: 4.2 MMOL/L (ref 3.5–5.2)
PREALB SERPL-MCNC: 12 MG/DL (ref 20–40)
PROT SERPL-MCNC: 6.1 G/DL (ref 6–8.5)
RBC # BLD AUTO: 2.48 10*6/MM3 (ref 3.77–5.28)
SODIUM SERPL-SCNC: 138 MMOL/L (ref 136–145)
WBC NRBC COR # BLD: 7.3 10*3/MM3 (ref 3.4–10.8)

## 2023-08-22 PROCEDURE — 85025 COMPLETE CBC W/AUTO DIFF WBC: CPT | Performed by: INTERNAL MEDICINE

## 2023-08-22 PROCEDURE — 92610 EVALUATE SWALLOWING FUNCTION: CPT

## 2023-08-22 PROCEDURE — 97535 SELF CARE MNGMENT TRAINING: CPT

## 2023-08-22 PROCEDURE — 63710000001 INSULIN LISPRO (HUMAN) PER 5 UNITS: Performed by: INTERNAL MEDICINE

## 2023-08-22 PROCEDURE — 97530 THERAPEUTIC ACTIVITIES: CPT | Performed by: PHYSICAL THERAPIST

## 2023-08-22 PROCEDURE — 84134 ASSAY OF PREALBUMIN: CPT | Performed by: INTERNAL MEDICINE

## 2023-08-22 PROCEDURE — 97166 OT EVAL MOD COMPLEX 45 MIN: CPT

## 2023-08-22 PROCEDURE — 97162 PT EVAL MOD COMPLEX 30 MIN: CPT | Performed by: PHYSICAL THERAPIST

## 2023-08-22 PROCEDURE — 82948 REAGENT STRIP/BLOOD GLUCOSE: CPT

## 2023-08-22 PROCEDURE — 99222 1ST HOSP IP/OBS MODERATE 55: CPT | Performed by: OTOLARYNGOLOGY

## 2023-08-22 PROCEDURE — 80053 COMPREHEN METABOLIC PANEL: CPT | Performed by: INTERNAL MEDICINE

## 2023-08-22 RX ORDER — NAPROXEN 250 MG/1
500 TABLET ORAL DAILY
Status: DISCONTINUED | OUTPATIENT
Start: 2023-08-22 | End: 2023-08-23

## 2023-08-22 RX ORDER — ALBUTEROL SULFATE 2.5 MG/3ML
2.5 SOLUTION RESPIRATORY (INHALATION) EVERY 6 HOURS PRN
Status: DISCONTINUED | OUTPATIENT
Start: 2023-08-22 | End: 2023-09-13 | Stop reason: HOSPADM

## 2023-08-22 RX ORDER — METOCLOPRAMIDE 5 MG/1
10 TABLET ORAL 4 TIMES DAILY
Status: DISCONTINUED | OUTPATIENT
Start: 2023-08-22 | End: 2023-08-23

## 2023-08-22 NOTE — CONSULTS
"Adult Nutrition  Assessment/PES    Patient Name:  Emili Schmitt  YOB: 1960  MRN: [id number]  Admit Date:  8/21/2023    Assessment Date:  8/22/2023       Reason for Assessment       Row Name 08/22/23 1429          Reason for Assessment    Reason For Assessment per organizational policy;physician consult;other (see comments)  ltach     Diagnosis infection/sepsis;pulmonary disease;cardiac disease     Identified At Risk by Screening Criteria difficulty chewing/swallowing                    Nutrition/Diet History       Row Name 08/22/23 1430          Nutrition/Diet History    Typical Intake (Food/Fluid/EN/PN) SLP approved soft textures, chopped meats, and thin liquids. Good PO with lunch. Adding Boost Glucose Control all meals for wound healing. Admit 194.9lb. Glu ranges 113-230.     Factors Affecting Nutritional Intake chewing difficulties                    Labs/Tests/Procedures/Meds       Row Name 08/22/23 1430          Labs/Procedures/Meds    Lab Results Reviewed reviewed     Lab Results Comments Glu 113-230        Diagnostic Tests/Procedures    Diagnostic Test/Procedure Reviewed reviewed        Medications    Pertinent Medications Reviewed reviewed     Pertinent Medications Comments See MAR                    Physical Findings       Row Name 08/22/23 1430          Physical Findings    Overall Physical Appearance Trach, BM 8/22, coccyx wound (unstageable), no edema. Tremors noted but no difficulty feeding self.                    Estimated/Assessed Needs - Anthropometrics       Row Name 08/22/23 1431 08/22/23 0900       Anthropometrics    Height -- 172.7 cm (68\")    Weight -- 88.4 kg (194 lb 14.4 oz)    Weight for Calculation 88.4 kg (194 lb 14.4 oz) --       Estimated/Assessed Needs    Additional Documentation Fluid Requirements (Group);[place]-St. Jeor Equation (Group);Protein Requirements (Group) --       [place]-St. Jeor Equation    RMR ([place]-St. Jeor Equation) 1497.56 --    [place]-St. " Hubert Activity Factors 1.2 --    Activity Factors (RashidaSt. Gaspar) 2307.072 --       Protein Requirements    Weight Used For Protein Calculations 63.5 kg (140 lb)  IBW --    Est Protein Requirement Amount (gms/kg) 1.4 gm protein --    Estimated Protein Requirements (gms/day) 88.91 --       Fluid Requirements    Fluid Requirements (mL/day) 1797 --    RDA Method (mL) 1797 --                   Nutrition Prescription Ordered       Row Name 08/22/23 1431          Nutrition Prescription PO    Current PO Diet Soft Texture     Texture Chopped     Fluid Consistency Thin                    Evaluation of Received Nutrient/Fluid Intake       Row Name 08/22/23 1431          Nutrient/Fluid Evaluation    Number of Days Evaluated Other (comment)  admission < 24 hours and diet ordered this morning        Fluid Intake Evaluation    Oral Fluid (mL) --  admission < 24 hours and diet ordered this morning        PO Evaluation    Number of Days PO Intake Evaluated Insufficient Data     % PO Intake admission < 24 hours and diet ordered this morning                       Problem/Interventions:   Problem 1       Row Name 08/22/23 1431          Nutrition Diagnoses Problem 1    Problem 1 Increased Nutrient Needs     Inadequate Intake Type Oral     Macronutrient Kcal     Micronutrient Vitamin;Mineral     Etiology (related to) Medical Diagnosis     Skin Pressure injury;Skin breakdown;Non healing wound     Signs/Symptoms (evidenced by) PO Intake;SLP/Swallow eval  initiating; day one PO 8/22/2023     Swallow eval status Done     Type of SLP Evaluation Bedside                          Intervention Goal       Row Name 08/22/23 1432          Intervention Goal    General Meet nutritional needs for age/condition;Reduce/improve symptoms;Disease management/therapy     PO Tolerate PO;Increase intake;Meet estimated needs     Weight No significant weight loss                    Nutrition Intervention       Row Name 08/22/23 1432          Nutrition  Intervention    RD/Tech Action Follow Tx progress;Care plan reviewd;Encourage intake;Recommend/ordered     Recommended/Ordered Supplement                    Nutrition Prescription       Row Name 08/22/23 1432          Nutrition Prescription PO    PO Prescription Begin/change supplement     Supplement Boost Glucose Control     Supplement Frequency 3 times a day     New PO Prescription Ordered? Yes                    Education/Evaluation       Row Name 08/22/23 1432          Education    Education No discharge needs identified at this time        Monitor/Evaluation    Monitor Per protocol                     Electronically signed by:  Inés Bonilla RDN, AUGUSTUS  08/22/23 14:32 CDT

## 2023-08-22 NOTE — H&P
ANISH Carbajal APRN          Internal Medicine History and Physical      Name: Emili Schmitt  MRN: [id number]     Acct: [id number]  Room: 6/    Admit Date: 8/21/2023  PCP: Remedios Qiu APRN    Chief Complaint:     Need for continued oxygen weaning and rehabilitation efforts    History Obtained From:     chart review and the patient    History of Present Illness:      Emili Schmitt is a  62 y.o.  female who presents with need for continued oxygen weaning and rehabilitation efforts following a recent acute care stay. The patient had been in her usual state of health when she underwent laparoscopic repair of recurrent paraesophageal hernia with incisionless fundoplasty and lysis of adhesions on 7/5/23 at [facility]. Patient returned to ER 10 days post-op with complaints of chest pain radiating to shoulder and associated with increased shortness of breath. She was found to have evidence of mediastinitis with pneumonia and pleural effusion. She underwent left chest tube placement and during EGD on 7/14, patient had esophageal stent placed. She became febrile with worsening respiratory function and required intubation with mechanical ventilation. On 7/19, she transferred to [facility] for higher level of care. Pleural fluid culture returned positive for candida glabrata. A second chest tube was placed on 7/20 for worsening pleural effusion. Patient continued with mechanical ventilation and required TPN for nutrition support. This was eventually transitioned to tube feeding via OG/NGT. Paralytics and sedation were weaned, but patient had decline in respiratory status necessitating continued paralytic and sedative therapy. She developed atrial fibrillation with RVR and was treated with cardizem and amiodarone drips as well as heparin drip that was later transitioned to anticoagulation with Eliquis. Amiodarone was  discontinued after patient developed significant bradycardia.  Due to significant hyperglycemia, she also required insulin drip. She had issues with constipation as well as nausea and vomiting requiring enemas and gastric decompression. She was unable to tolerated sedation and vent liberation and underwent tracheostomy tube placement on 8/8. She was noted to have significant edema to RLE and venous doppler confirmed DVT. Patient stabilized somewhat and chest tubes were discontinued. She developed a large unstageable decubitus ulcer to the coccyx and was seen in consultation by wound care. She tolerated oxygen weaning and is maintaining adequate 02 sats per trach collar. She completed antibiotic and antifungal therapy. She underwent barium swallow that showed no signs of esophageal leak and esophageal stent was removed on 8/21. She transferred to our facility for continued oxygen weaning, rehabilitation efforts and wound care.     Past Medical History:     Past Medical History:   Diagnosis Date    Arthritis     Interstitial cystitis     Macular dystrophy     Neuropathy     Restless leg syndrome         Past Surgical History:     Past Surgical History:   Procedure Laterality Date    APPENDECTOMY      BREAST CYST ASPIRATION Left     HERNIA REPAIR      HYSTERECTOMY      [facility] unsure if she has tubes due to hemmorage after birth    OTHER SURGICAL HISTORY      TIF surgery        Medications Prior to Admission:       Prior to Admission medications    Medication Sig Start Date End Date Taking? Authorizing Provider   diclofenac (VOLTAREN) 75 MG EC tablet Take 1 tablet by mouth 2 (Two) Times a Day. 3/21/22   Heather Maloney APRN   DULoxetine (CYMBALTA) 60 MG capsule  1/7/20   ProviderGodfrey MD   escitalopram (LEXAPRO) 10 MG tablet Take 10 mg by mouth Daily.    Godfrey Vega MD   metoprolol tartrate (LOPRESSOR) 50 MG tablet Take 50 mg by mouth 2 (Two) Times a Day.    Godfrey Vega MD   rOPINIRole  (REQUIP) 1 MG tablet  12/11/19   Provider, Godfrey, MD        Allergies:       Hydrocodone and Tylenol [acetaminophen]    Social History:     Tobacco:    reports that she has been smoking cigarettes. She has been smoking an average of 1 pack per day. She has never used smokeless tobacco.  Alcohol:      reports no history of alcohol use.  Drug Use:  reports no history of drug use.    Family History:     Family History   Problem Relation Age of Onset    Breast cancer Neg Hx     Ovarian cancer Neg Hx     Uterine cancer Neg Hx     Colon cancer Neg Hx     Melanoma Neg Hx        Review of Systems:     Review of Systems   Constitutional: Positive for malaise/fatigue. Negative for chills, decreased appetite, weight gain and weight loss.   HENT:  Negative for congestion, ear discharge, hoarse voice and tinnitus.    Eyes:  Negative for blurred vision, discharge, visual disturbance and visual halos.   Cardiovascular:  Positive for dyspnea on exertion. Negative for chest pain, claudication, irregular heartbeat, leg swelling, orthopnea and paroxysmal nocturnal dyspnea.   Respiratory:  Positive for shortness of breath. Negative for cough, sputum production and wheezing.    Endocrine: Negative for cold intolerance, heat intolerance and polyuria.   Hematologic/Lymphatic: Negative for adenopathy. Does not bruise/bleed easily.   Skin:  Positive for poor wound healing. Negative for dry skin, itching and suspicious lesions.   Musculoskeletal:  Negative for arthritis, back pain, falls, joint pain, muscle weakness and myalgias.   Gastrointestinal:  Negative for abdominal pain, constipation, diarrhea, dysphagia and hematemesis.   Genitourinary:  Negative for bladder incontinence, dysuria and frequency.   Neurological:  Positive for weakness. Negative for aphonia, disturbances in coordination and dizziness.   Psychiatric/Behavioral:  Negative for altered mental status, depression, memory loss and substance abuse. The patient does not  "have insomnia and is not nervous/anxious.      Code Status:    There are no questions and answers to display.       Physical Exam:     Vitals:  Ht 172.7 cm (68\")   Wt 88.4 kg (194 lb 14.4 oz)   BMI 29.63 kg/m²   T 97.6 P 70 R 16 Bp 120/65 Sp02 99% (trach collar)  Physical Exam  Vitals and nursing note reviewed.   Constitutional:       Appearance: Normal appearance.   HENT:      Head: Normocephalic and atraumatic.      Right Ear: External ear normal.      Left Ear: External ear normal.      Nose: Nose normal.      Mouth/Throat:      Mouth: Mucous membranes are moist.      Pharynx: Oropharynx is clear.   Eyes:      Extraocular Movements: Extraocular movements intact.      Conjunctiva/sclera: Conjunctivae normal.      Pupils: Pupils are equal, round, and reactive to light.   Neck:      Comments: Trach to collar with PMV  Cardiovascular:      Rate and Rhythm: Normal rate and regular rhythm.      Pulses: Normal pulses.      Heart sounds: Normal heart sounds.   Pulmonary:      Effort: Pulmonary effort is normal.      Breath sounds: Normal breath sounds.   Abdominal:      General: Bowel sounds are normal.      Palpations: Abdomen is soft.   Musculoskeletal:      Comments: Generalized weakness   Skin:     Comments: Large decubitus to coccyx with large amount of slough covering wound bed  No significant drainage or surrounding erythema   Neurological:      Mental Status: She is alert and oriented to person, place, and time.   Psychiatric:         Mood and Affect: Mood normal.         Behavior: Behavior normal.             Data:     Lab Results (last 7 days)       Procedure Component Value Units Date/Time    POC Glucose Once [[id number]]  (Abnormal) Collected: 08/22/23 1217    Specimen: Blood Updated: 08/22/23 1230     Glucose 230 mg/dL      Comment: : maite Arnold) LisaMeter ID: [id number]       POC Glucose Once [[id number]]  (Normal) Collected: 08/22/23 0729    Specimen: Blood Updated: 08/22/23 0740     " Glucose 122 mg/dL      Comment: : efrain Rivers SethMeter ID: [id number]       Comprehensive Metabolic Panel [[id number]]  (Abnormal) Collected: 08/22/23 0546    Specimen: Blood Updated: 08/22/23 0715     Glucose 113 mg/dL      BUN 13 mg/dL      Creatinine 0.28 mg/dL      Sodium 138 mmol/L      Potassium 4.2 mmol/L      Chloride 103 mmol/L      CO2 27.0 mmol/L      Calcium 10.1 mg/dL      Total Protein 6.1 g/dL      Albumin 3.3 g/dL      ALT (SGPT) 51 U/L      AST (SGOT) 31 U/L      Alkaline Phosphatase 65 U/L      Total Bilirubin 0.4 mg/dL      Globulin 2.8 gm/dL      A/G Ratio 1.2 g/dL      BUN/Creatinine Ratio 46.4     Anion Gap 8.0 mmol/L      eGFR 122.9 mL/min/1.73     Narrative:      GFR Normal >60  Chronic Kidney Disease <60  Kidney Failure <15      CBC & Differential [[id number]]  (Abnormal) Collected: 08/22/23 0546    Specimen: Blood Updated: 08/22/23 0700    Narrative:      The following orders were created for panel order CBC & Differential.  Procedure                               Abnormality         Status                     ---------                               -----------         ------                     CBC Auto Differential[[id number]]        Abnormal            Final result                 Please view results for these tests on the individual orders.    CBC Auto Differential [[id number]]  (Abnormal) Collected: 08/22/23 0546    Specimen: Blood Updated: 08/22/23 0700     WBC 7.30 10*3/mm3      RBC 2.48 10*6/mm3      Hemoglobin 7.3 g/dL      Hematocrit 24.7 %      MCV 99.6 fL      MCH 29.4 pg      MCHC 29.6 g/dL      RDW 15.8 %      RDW-SD 55.8 fl      MPV 10.2 fL      Platelets 341 10*3/mm3      Neutrophil % 65.8 %      Lymphocyte % 20.7 %      Monocyte % 6.3 %      Eosinophil % 2.6 %      Basophil % 0.5 %      Immature Grans % 4.1 %      Neutrophils, Absolute 4.80 10*3/mm3      Lymphocytes, Absolute 1.51 10*3/mm3      Monocytes, Absolute 0.46 10*3/mm3      Eosinophils, Absolute 0.19  10*3/mm3      Basophils, Absolute 0.04 10*3/mm3      Immature Grans, Absolute 0.30 10*3/mm3      nRBC 0.0 /100 WBC     Prealbumin [[id number]] Collected: 08/22/23 0546    Specimen: Blood Updated: 08/22/23 0651    POC Glucose Once [[id number]]  (Abnormal) Collected: 08/21/23 2017    Specimen: Blood Updated: 08/21/23 2029     Glucose 156 mg/dL      Comment: : roberto Christine ID: [id number]             XR Chest 1 View    Result Date: 8/21/2023  Narrative: EXAMINATION: XR CHEST 1 VW-  8/21/2023 7:23 PM CDT  HISTORY: Tracheostomy tube. Hospital admission for long-term acute care.  One x-ray.  Comparison is made with 03/05/2009.  Heart size is within normal limits. A tracheostomy tube is present.  Left diaphragm elevation is more prominent than was seen on the remote comparison study.  There is left greater than right basilar infiltrate. There appears to be mild atelectasis at the right lung base.  The left basilar infiltrate terminated and suggest pneumonia.  There is no pneumothorax.  Summary: 1. Right basilar atelectasis. 2. Left basilar pneumonia suspected.  This report was finalized on 08/21/2023 20:23 by Dr. Manny Franz MD.       Assessment:         * No active hospital problems. *    Past Medical History:   Diagnosis Date    Arthritis     Interstitial cystitis     Macular dystrophy     Neuropathy     Restless leg syndrome        Plan:     Acute hypoxic respiratory failure s/p tracheostomy tube  PAF  Acute RUE DVT  Chronic anticoagulation  Multifactorial anemia  Peripheral neuropathy  DM2 with hyperglycemia not on long term insulin  HTN  Anxiety  GERD  Constipation  Unstageable decubitus ulcer to coccyx  Continue current treatment. Monitor counts. Increase activity. Labs Thursday. Wound care per wound care team. Glycemic control. Continue rate and rhythm control efforts. Oxygen weaning as tolerated. Trach management per ENT.       Electronically signed by BHAVESH Mills on  8/22/2023 at 12:40 CDT     Copy sent to Dr. Qiu, BHAVESH Nice  I have discussed the care of Emili Schmitt, including pertinent history and exam findings, with the nurse practitioner.    I have seen and examined the patient and the key elements of all parts of the encounter have been performed by me.  I agree with the assessment, plan and orders as documented by BHAVESH Sheehan, after I modified the exam findings and the plan of treatments and the final version is my approved version of the assessment.        Electronically signed by Montez Dan MD on 8/23/2023 at 10:38 CDT

## 2023-08-22 NOTE — CONSULTS
[facility] Otolaryngology Head and Neck Surgery  CONSULT  Patient Name: Emili Schmitt  : 1960  MRN: [id number]  Primary Care Physician:  Remedios Qiu APRN  Referring Physician: Montez Dan MD  Date of admission: 2023  Length of Stay: 0  Room: [unfilled]      Subjective    Subjective   History of Present Illness  Chief Complaint/Reason for Consultation: Tracheostomy management  Accompanied by: RT  Emili Schmitt is a 62 y.o. female has been transferred to [facility] for further rehabilitation and tracheostomy management.  Patient has had a complex medical history.  She underwent general surgical GI procedure.  She suffered complications.  She required chest tube for mediastinitis and left chest effusion.  Patient was transferred to [facility] in [place].  She required increased level of care.  Patient was maintained on the ventilator with endotracheal tube.  She underwent tracheostomy on 2023.  She continues to wean from the ventilator.  Her underlying medical conditions continue to improve to the point where she was able to liberate from the ventilator.  She is now transferred to [facility] for further oxygen weaning, tracheostomy management, further rehabilitation.  ENT has been consulted for tracheostomy management.  The patient is awake and alert.  She gives some history.  She is currently maintained with a tracheostomy and Passy-Dylon valve.  She is on trach collar and humidified oxygen.  RT notes that the patient is stable with a trach in position, Passy-Ardmore valve, supplemental oxygen.  Patient is a, chart is reviewed    Review of Systems   Otherwise complete ROS is negative except as mentioned above.    Past Medical History:   Past Medical History:   Diagnosis Date    Arthritis     Interstitial cystitis     Macular dystrophy     Neuropathy     Restless leg syndrome      Past Surgical History:  Past Surgical History:   Procedure Laterality  Date    APPENDECTOMY      BREAST CYST ASPIRATION Left     HERNIA REPAIR      HYSTERECTOMY      [facility] unsure if she has tubes due to hemmorage after birth    OTHER SURGICAL HISTORY      TIF surgery       Family History: Her family history is not on file.   Social History: She  reports that she has been smoking cigarettes. She has been smoking an average of 1 pack per day. She has never used smokeless tobacco. She reports that she does not drink alcohol and does not use drugs.    Home Medications:  DULoxetine, diclofenac, escitalopram, metoprolol tartrate, and rOPINIRole    Allergies:  She is allergic to hydrocodone and tylenol [acetaminophen].    Objective    Objective   Vitals:         Physical Exam  Vitals reviewed.   Constitutional:       General: She is awake. She is not in acute distress.     Appearance: Normal appearance. She is well-developed. She is obese. She is ill-appearing. She is not toxic-appearing.      Interventions: She is intubated.      Comments: Lying in bed, trach in position, Passy-Dylon valve in position, trach collar  Patient appears mildly anxious but stable   HENT:      Head: Normocephalic and atraumatic.      Right Ear: Hearing and external ear normal.      Left Ear: Hearing and external ear normal.      Nose: Mucosal edema present. No nasal tenderness.      Left Nostril: No epistaxis or septal hematoma.      Right Turbinates: Enlarged and swollen.      Left Turbinates: Enlarged and swollen.      Comments: Left nasal mucosa with crusting, old blood present     Mouth/Throat:      Lips: Pink. No lesions.      Mouth: Mucous membranes are dry.      Dentition: Normal dentition. No dental caries or gum lesions.      Tongue: No lesions. Tongue does not deviate from midline.      Palate: No mass and lesions.      Pharynx: Oropharynx is clear. Uvula midline.      Comments: Macroglossia-moderate  Eyes:      General: Lids are normal. Gaze aligned appropriately.      Extraocular Movements: Extraocular  movements intact.      Conjunctiva/sclera: Conjunctivae normal.   Neck:      Trachea: Tracheostomy present.      Comments: TRACHEOSTOMY SITE:    Tracheostomy tube type: Shiley #6 cuffed DIC, flexible shaft    Stoma: Healing appropriately    Secretions: Minimal    Passey-Marquette valve: In place    Voice: Weak  Date placed: 8/8/2023  Date last changed: Never     Pulmonary:      Effort: Pulmonary effort is normal. Tachypnea (Mild) present. No respiratory distress. She is intubated.      Breath sounds: No stridor.      Comments: Trach in position, trach collar  Passy-Dylon valve in position  Musculoskeletal:      Cervical back: No rigidity or crepitus. Decreased range of motion.   Lymphadenopathy:      Cervical: No cervical adenopathy.   Skin:     Findings: No rash.   Neurological:      General: No focal deficit present.      Mental Status: She is alert and oriented to person, place, and time.      Cranial Nerves: Cranial nerves 2-12 are intact. No cranial nerve deficit.   Psychiatric:         Attention and Perception: Attention and perception normal.         Mood and Affect: Mood is anxious.         Behavior: Behavior normal.         Thought Content: Thought content normal.         Judgment: Judgment normal.       Result Review    Result Review:  I have personally reviewed the results from the time of this admission to 8/22/2023 18:29 CDT and agree with these findings:  []  Laboratory  []  Microbiology  []  Radiology  []  EKG/Telemetry   []  Cardiology/Vascular   []  Pathology  []  Old records  []  Other:  Most notable findings include: Anemia decreased albumin  Chest x-ray reviewed, agree with radiology interpretation  Comprehensive Metabolic Panel (08/22/2023 05:46)    CBC & Differential (08/22/2023 05:46)    XR Chest 1 View (08/21/2023 20:12)    Assessment & Plan    Assessment / Plan   Brief Patient Summary:  Emili Schmitt is a 62 y.o. female who was transferred to [facility] with a trach in position.  ENT has been  consulted for evaluation of trach.  Patient currently has a stable trach in position.  I will plan to continue tracheostomy management.  She is being evaluated by other consultants at this time.  Patient appears to be mildly tachypneic.  I feel she will need to trach for a little while longer at this point in time.  She does not need the cuffed trach.  I will plan downsizing soon.  I will continue recurrent evaluation.  Patient is to be evaluated by speech pathology for possible p.o. intake.  She is apparently starting p.o. intake before transfer.    Active Hospital Problems:  Active Hospital Problems    Diagnosis     Acute tracheostomy management     Acute on chronic respiratory failure with hypoxia and hypercapnia            Plan:   Tracheostomy management-the patient is stable trach in position.  I will begin evaluation of her tracheostomy and plan downsizing and weaning as appropriate.  Tachypnea-patient is mildly tachypneic this morning.  I am concerned that she remains somewhat weak following her prolonged ventilator dependence.  This will certainly affect her tracheostomy weaning.  She is currently tolerating her situation.  I will follow closely.  GI complications-Per primary team  Pleural effusion-Per primary team    I discussed the patient's findings and my recommendations with patient, RT.  Following patient as in-patient. Further recommendations will be made based on serial examinations.    Medications/Orders for this encounter: No new medications ordered.  No New orders written.         DVT prophylaxis:  Medical DVT prophylaxis orders are present.    Discharge Planning: Per primary team    Electronically signed by Jef Velázquez Jr, MD, 08/22/23, 8:17 AM CDT.

## 2023-08-23 LAB — GLUCOSE BLDC GLUCOMTR-MCNC: 127 MG/DL (ref 70–130)

## 2023-08-23 PROCEDURE — 97530 THERAPEUTIC ACTIVITIES: CPT

## 2023-08-23 PROCEDURE — 99232 SBSQ HOSP IP/OBS MODERATE 35: CPT | Performed by: OTOLARYNGOLOGY

## 2023-08-23 PROCEDURE — 82948 REAGENT STRIP/BLOOD GLUCOSE: CPT

## 2023-08-23 PROCEDURE — 92526 ORAL FUNCTION THERAPY: CPT

## 2023-08-23 RX ORDER — ALPRAZOLAM 0.25 MG/1
0.25 TABLET ORAL 2 TIMES DAILY PRN
Status: DISCONTINUED | OUTPATIENT
Start: 2023-08-23 | End: 2023-08-30

## 2023-08-23 NOTE — PROGRESS NOTES
[facility] Otolaryngology Head and Neck Surgery  INPATIENT PROGRESS NOTE    Patient Name: Emili Schmitt  : 1960   MRN: [id number]  Date of Admission: 2023  Today's Date: 23  Length of Stay: 0  [unfilled]   Montez Dan MD   Primary Care Physician: Remedios Qiu APRN  Surgical Procedures Since Admission:    Subjective   Subjective   Chief Complaint: Tracheostomy management  HPI   Accompanied by: No one  Since last examined, Emili Schmitt has remained stable with a trach in position.  She is vocalizing well.  She says she is eating.  She says she sat up at the bedside.  Overall, she feels better.  She has had no major difficulties breathing.  RT reports patient requires frequent suction.  She does have some mucous plugging.  She is tolerating her Passy-Dylon valve well.  Patient seen, chart is reviewed    Review of Systems   No change from prior inquiry  All pertinent negatives and positives are as above. All other systems have been reviewed and are negative unless otherwise stated.   Objective   Objective   Vitals:        Physical Exam  Vitals reviewed.   Constitutional:       Appearance: Normal appearance. She is well-developed. She is obese.      Interventions: She is intubated.      Comments: Lying in bed, trach in position, trach collar, Passy-Dylon in position.  Appears much improved over yesterday.   HENT:      Head: Normocephalic and atraumatic.      Right Ear: External ear normal.      Left Ear: External ear normal.      Nose: Nose normal.   Eyes:      General: Lids are normal. Gaze aligned appropriately.      Extraocular Movements: Extraocular movements intact.      Conjunctiva/sclera: Conjunctivae normal.   Pulmonary:      Effort: Pulmonary effort is normal. No respiratory distress. She is intubated.      Breath sounds: No stridor.   Musculoskeletal:      Cervical back: No rigidity or crepitus. Decreased range of motion.   Lymphadenopathy:       Cervical: No cervical adenopathy.   Skin:     Findings: No rash.   Neurological:      General: No focal deficit present.      Mental Status: She is alert and oriented to person, place, and time.      Cranial Nerves: Cranial nerves 2-12 are intact. No cranial nerve deficit.   Psychiatric:         Attention and Perception: Attention and perception normal.         Mood and Affect: Mood and affect normal.         Behavior: Behavior normal. Behavior is cooperative.         Thought Content: Thought content normal.         Cognition and Memory: Cognition normal.         Judgment: Judgment normal.         Result Review    Result Review:  I have personally reviewed the results from the time of this admission to 8/23/2023 08:34 CDT and agree with these findings:  []  Laboratory  []  Microbiology  []  Radiology  []  EKG/Telemetry   []  Cardiology/Vascular   []  Pathology  []  Old records  []  Other:  Most notable findings include: No labs pertinent to ENT this morning  H&P by Marilee Botello APRN (08/22/2023 12:39)    Assessment & Plan   Assessment / Plan   Brief Patient Summary:  Emili Schmitt is a 62 y.o. female who remained stable with trach in position.  She appears much improved this morning.  She appears stronger.  I will continue current trach care.  I will assess for trach downsizing in the near future.  She is taking a regular diet without aspiration at this point in time.    Active Hospital Problems:   Active Hospital Problems    Diagnosis     Acute tracheostomy management     Acute on chronic respiratory failure with hypoxia and hypercapnia      Plan:   Tracheostomy management-the patient is stable trach in position.  I will continue her current trach for now.  I will continue tracheostomy management.  Aspiration-the patient has no evidence of aspiration.  She has passed her swallowing test.  She is beginning p.o. intake.  General debility-the patient is beginning to mobilize.  I await her full  mobilization for increase strength and breathing.  Discussed Plan with patient, RT  Following patient as in-patient. Further recommendations will be made based on serial examinations.    Medications/Orders for this encounter: No new medications ordered.  No New orders written.     Discharge Planning: Per primary team        DVT prophylaxis:  Medical DVT prophylaxis orders are present.     Electronically signed by Jef Velázquez Jr, MD, 08/23/23, 8:34 AM CDT.

## 2023-08-23 NOTE — PROGRESS NOTES
ANISH Carbajal APRN        Internal Medicine Progress Note    8/23/2023   09:56 CDT    Name:  Emili Schmitt  MRN:    [id number]     Acct:     [id number]   Room:  [facility] Day: 0     Admit Date: 8/21/2023  6:54 PM  PCP: Remedios Qiu APRN    Subjective:     C/C: need for continued rehabilitation    Interval History: Status:  stayed the same. Resting in bed. Daughter at bedside. Afebrile. Tolerating trach collar with 28% 02 with PMV. Having some pain to wound. Blood sugars stable.     Review of Systems   Constitutional: Positive for malaise/fatigue. Negative for chills, decreased appetite, weight gain and weight loss.   HENT:  Negative for congestion, ear discharge, hoarse voice and tinnitus.    Eyes:  Negative for blurred vision, discharge, visual disturbance and visual halos.   Cardiovascular:  Positive for dyspnea on exertion. Negative for chest pain, claudication, irregular heartbeat, leg swelling, orthopnea and paroxysmal nocturnal dyspnea.   Respiratory:  Positive for cough and shortness of breath. Negative for sputum production and wheezing.    Endocrine: Negative for cold intolerance, heat intolerance and polyuria.   Hematologic/Lymphatic: Negative for adenopathy. Does not bruise/bleed easily.   Skin:  Positive for poor wound healing. Negative for dry skin, itching and suspicious lesions.   Musculoskeletal:  Negative for arthritis, back pain, falls, joint pain, muscle weakness and myalgias.   Gastrointestinal:  Negative for abdominal pain, constipation, diarrhea, dysphagia and hematemesis.   Genitourinary:  Negative for bladder incontinence, dysuria and frequency.   Neurological:  Positive for weakness. Negative for aphonia, disturbances in coordination and dizziness.   Psychiatric/Behavioral:  Negative for altered mental status, depression, memory loss and substance abuse. The patient does not have insomnia and is not nervous/anxious.         Medications:     Allergies:   Allergies   Allergen Reactions    Hydrocodone Itching     Severe itching    Tylenol [Acetaminophen] Itching       Current Meds:   Current Facility-Administered Medications:     acetylcysteine (MUCOMYST) 20 % nebulizer solution 3 mL, 3 mL, Nebulization, BID - RT, Montez Dan MD    albuterol (PROVENTIL) nebulizer solution 0.083% 2.5 mg/3mL, 2.5 mg, Nebulization, Q6H PRN, Montez Dan MD    amiodarone (PACERONE) tablet 200 mg, 200 mg, Oral, Q24H, Montez Dan MD    apixaban (ELIQUIS) tablet 5 mg, 5 mg, Oral, Q12H, Montez Dan MD    bisacodyl (DULCOLAX) suppository 10 mg, 10 mg, Rectal, Daily PRN, Montez Dan MD    busPIRone (BUSPAR) tablet 5 mg, 5 mg, Oral, TID With Meals, Montez Dan MD    dextrose (D50W) (25 g/50 mL) IV injection 25 g, 25 g, Intravenous, Q15 Min PRN, Montez Dan MD    dextrose (GLUTOSE) oral gel 15 g, 15 g, Oral, Q15 Min PRN, Montez Dan MD    DULoxetine (CYMBALTA) DR capsule 60 mg, 60 mg, Oral, Daily, Montez Dan MD    gabapentin (NEURONTIN) capsule 200 mg, 200 mg, Oral, TID, Montez Dan MD    glucagon (GLUCAGEN) injection 1 mg, 1 mg, Intramuscular, Q15 Min PRN, Montez Dan MD    hydrALAZINE (APRESOLINE) injection 5 mg, 5 mg, Intravenous, Q4H PRN, Montez Dan MD    Insulin Lispro (humaLOG) injection 2-7 Units, 2-7 Units, Subcutaneous, 4x Daily AC & at Bedtime, Montez Dan MD    ipratropium-albuterol (DUO-NEB) nebulizer solution 3 mL, 3 mL, Nebulization, 4x Daily - RT, Montez Dan MD    lidocaine (LIDODERM) 5 % 2 patch, 2 patch, Transdermal, Q24H, Montez Dan MD    metoclopramide (REGLAN) tablet 10 mg, 10 mg, Oral, 4x Daily, Montez Dan MD    metoprolol succinate XL (TOPROL-XL) 24 hr tablet 50 mg, 50 mg, Oral, Q24H, Montez Dan MD    naproxen (NAPROSYN) tablet 500 mg, 500 mg, Oral,  "Daily, Montez Dan MD    OLANZapine (zyPREXA) tablet 5 mg, 5 mg, Oral, BID, Montez Dan MD    ondansetron (ZOFRAN) tablet 4 mg, 4 mg, Oral, Q6H PRN **OR** ondansetron (ZOFRAN) injection 4 mg, 4 mg, Intravenous, Q6H PRN, Montez Dan MD    oxyCODONE (ROXICODONE) immediate release tablet 5 mg, 5 mg, Oral, Q6H PRN, Montez Dan MD    pantoprazole (PROTONIX) EC tablet 40 mg, 40 mg, Oral, BID, Montez Dan MD    polyethylene glycol (MIRALAX) packet 17 g, 17 g, Oral, BID, Montez Dan MD    sennosides-docusate (PERICOLACE) 8.6-50 MG per tablet 2 tablet, 2 tablet, Oral, BID, Montez Dan MD    simethicone (MYLICON) chewable tablet 80 mg, 80 mg, Oral, 4x Daily, Montez Dan MD    sodium hypochlorite (HYSEPT) 0.25 % topical solution, , Topical, Q12H, Montez Dan MD    traZODone (DESYREL) tablet 150 mg, 150 mg, Oral, Nightly, Montez Dan MD    vitamin D3 tablet 5,000 Units, 5,000 Units, Oral, Daily, Montez Dan MD    Data:     Code Status:    There are no questions and answers to display.       Family History   Problem Relation Age of Onset    Breast cancer Neg Hx     Ovarian cancer Neg Hx     Uterine cancer Neg Hx     Colon cancer Neg Hx     Melanoma Neg Hx        Social History     Socioeconomic History    Marital status:    Tobacco Use    Smoking status: Every Day     Packs/day: 1.00     Types: Cigarettes    Smokeless tobacco: Never   Substance and Sexual Activity    Alcohol use: Never    Drug use: Never    Sexual activity: Yes     Partners: Male     Birth control/protection: Surgical       Vitals:  Ht 172.7 cm (68\")   Wt 88.4 kg (194 lb 14.4 oz)   BMI 29.63 kg/m²   T 98.6 P 56 R 19 /68 Sp02 100% (trach collar)            I/O (24Hr):  No intake or output data in the 24 hours ending 08/23/23 0956    Labs and imaging:      Recent Results (from the past 12 hour(s))   POC Glucose Once    Collection " Time: 08/23/23  7:51 AM    Specimen: Blood   Result Value Ref Range    Glucose 127 70 - 130 mg/dL                               Physical Examination:        Physical Exam  Vitals and nursing note reviewed.   Constitutional:       Appearance: Normal appearance.   HENT:      Head: Normocephalic and atraumatic.      Right Ear: External ear normal.      Left Ear: External ear normal.      Nose: Nose normal.      Mouth/Throat:      Mouth: Mucous membranes are moist.      Pharynx: Oropharynx is clear.   Eyes:      Extraocular Movements: Extraocular movements intact.      Conjunctiva/sclera: Conjunctivae normal.      Pupils: Pupils are equal, round, and reactive to light.   Neck:      Comments: Trach to collar with PMV  Cardiovascular:      Rate and Rhythm: Normal rate and regular rhythm.      Pulses: Normal pulses.      Heart sounds: Normal heart sounds.   Pulmonary:      Effort: Pulmonary effort is normal.      Breath sounds: Normal breath sounds.   Abdominal:      General: Bowel sounds are normal.      Palpations: Abdomen is soft.   Musculoskeletal:      Comments: Generalized weakness   Skin:     Comments: Dressing c.d.i   Neurological:      Mental Status: She is alert and oriented to person, place, and time.   Psychiatric:         Mood and Affect: Mood normal.         Behavior: Behavior normal.         Assessment:             Acute tracheostomy management    Acute on chronic respiratory failure with hypoxia and hypercapnia    Past Medical History:   Diagnosis Date    Arthritis     Interstitial cystitis     Macular dystrophy     Neuropathy     Restless leg syndrome         Plan:        Acute hypoxic respiratory failure s/p tracheostomy tube  PAF  Acute RUE DVT  Chronic anticoagulation  Multifactorial anemia  Peripheral neuropathy  DM2 with hyperglycemia not on long term insulin  HTN  Anxiety  GERD  Constipation  Unstageable decubitus ulcer to coccyx  Continue current treatment. Monitor counts. Increase activity. Labs in  am. Oxygen weaning as tolerated. Trach management per ENT. Wound care per wound care team. Aggressive therapies as tolerated. Maintain patient safety.       Electronically signed by BHAVESH Mills on 8/23/2023 at 09:56 CDT     I have discussed the care of Emili Schmitt, including pertinent history and exam findings, with the nurse practitioner.    I have seen and examined the patient and the key elements of all parts of the encounter have been performed by me.  I agree with the assessment, plan and orders as documented by BHAVESH Sheehan, after I modified the exam findings and the plan of treatments and the final version is my approved version of the assessment.        Electronically signed by Montez Dan MD on 8/23/2023 at 10:39 CDT

## 2023-08-23 NOTE — PROGRESS NOTES
[facility] Fall Risk Assessment Note    Name: Emili Schmitt  MRN: [id number]  [facility]: [id number]  Admit Date/Time: 8/21/2023  6:54 PM.    Currently ordered medications associated with an increased risk for fall include:    Scheduled Meds:  amiodarone, 200 mg, Oral, Q24H  busPIRone, 5 mg, Oral, TID With Meals  DULoxetine, 60 mg, Oral, Daily  gabapentin, 200 mg, Oral, TID  metoprolol succinate XL, 50 mg, Oral, Q24H  OLANZapine, 5 mg, Oral, BID  pantoprazole, 40 mg, Oral, BID  polyethylene glycol, 17 g, Oral, BID  senna-docusate sodium, 2 tablet, Oral, BID  traZODone, 150 mg, Oral, Nightly    PRN Meds:    bisacodyl    hydrALAZINE    oxyCODONE    German Mckinney, Pharmacy Intern  08/23/23 15:02 CDT

## 2023-08-24 LAB
ABO GROUP BLD: NORMAL
ANION GAP SERPL CALCULATED.3IONS-SCNC: 7 MMOL/L (ref 5–15)
ANISOCYTOSIS BLD QL: NORMAL
ANTI-E: NORMAL
BASO STIPL COARSE BLD QL SMEAR: NORMAL
BLD GP AB SCN SERPL QL: POSITIVE
BUN SERPL-MCNC: 14 MG/DL (ref 8–23)
BUN/CREAT SERPL: 43.8 (ref 7–25)
CALCIUM SPEC-SCNC: 9.3 MG/DL (ref 8.6–10.5)
CHLORIDE SERPL-SCNC: 105 MMOL/L (ref 98–107)
CO2 SERPL-SCNC: 26 MMOL/L (ref 22–29)
CREAT SERPL-MCNC: 0.32 MG/DL (ref 0.57–1)
CRP SERPL-MCNC: 2.82 MG/DL (ref 0–0.5)
DEPRECATED RDW RBC AUTO: 56.6 FL (ref 37–54)
E AG RBC QL: NEGATIVE
EGFRCR SERPLBLD CKD-EPI 2021: 119 ML/MIN/1.73
EOSINOPHIL # BLD MANUAL: 0.15 10*3/MM3 (ref 0–0.4)
EOSINOPHIL NFR BLD MANUAL: 2 % (ref 0.3–6.2)
ERYTHROCYTE [DISTWIDTH] IN BLOOD BY AUTOMATED COUNT: 16 % (ref 12.3–15.4)
ERYTHROCYTE [SEDIMENTATION RATE] IN BLOOD: 34 MM/HR (ref 0–30)
GLUCOSE SERPL-MCNC: 125 MG/DL (ref 65–99)
HCT VFR BLD AUTO: 22.2 % (ref 34–46.6)
HGB BLD-MCNC: 6.7 G/DL (ref 12–15.9)
LYMPHOCYTES # BLD MANUAL: 1.67 10*3/MM3 (ref 0.7–3.1)
LYMPHOCYTES NFR BLD MANUAL: 5 % (ref 5–12)
MCH RBC QN AUTO: 29.5 PG (ref 26.6–33)
MCHC RBC AUTO-ENTMCNC: 30.2 G/DL (ref 31.5–35.7)
MCV RBC AUTO: 97.8 FL (ref 79–97)
MONOCYTES # BLD: 0.38 10*3/MM3 (ref 0.1–0.9)
NEUTROPHILS # BLD AUTO: 5.39 10*3/MM3 (ref 1.7–7)
NEUTROPHILS NFR BLD MANUAL: 69 % (ref 42.7–76)
NEUTS BAND NFR BLD MANUAL: 2 % (ref 0–5)
PLAT MORPH BLD: NORMAL
PLATELET # BLD AUTO: 335 10*3/MM3 (ref 140–450)
PMV BLD AUTO: 10.1 FL (ref 6–12)
POIKILOCYTOSIS BLD QL SMEAR: NORMAL
POLYCHROMASIA BLD QL SMEAR: NORMAL
POTASSIUM SERPL-SCNC: 3.6 MMOL/L (ref 3.5–5.2)
RBC # BLD AUTO: 2.27 10*6/MM3 (ref 3.77–5.28)
RH BLD: POSITIVE
SODIUM SERPL-SCNC: 138 MMOL/L (ref 136–145)
T&S EXPIRATION DATE: NORMAL
VARIANT LYMPHS NFR BLD MANUAL: 22 % (ref 19.6–45.3)
WBC MORPH BLD: NORMAL
WBC NRBC COR # BLD: 7.59 10*3/MM3 (ref 3.4–10.8)

## 2023-08-24 PROCEDURE — 86870 RBC ANTIBODY IDENTIFICATION: CPT | Performed by: INTERNAL MEDICINE

## 2023-08-24 PROCEDURE — 86140 C-REACTIVE PROTEIN: CPT | Performed by: INTERNAL MEDICINE

## 2023-08-24 PROCEDURE — 86900 BLOOD TYPING SEROLOGIC ABO: CPT | Performed by: INTERNAL MEDICINE

## 2023-08-24 PROCEDURE — 86901 BLOOD TYPING SEROLOGIC RH(D): CPT | Performed by: INTERNAL MEDICINE

## 2023-08-24 PROCEDURE — 86902 BLOOD TYPE ANTIGEN DONOR EA: CPT

## 2023-08-24 PROCEDURE — 85007 BL SMEAR W/DIFF WBC COUNT: CPT | Performed by: INTERNAL MEDICINE

## 2023-08-24 PROCEDURE — 80048 BASIC METABOLIC PNL TOTAL CA: CPT | Performed by: INTERNAL MEDICINE

## 2023-08-24 PROCEDURE — P9016 RBC LEUKOCYTES REDUCED: HCPCS

## 2023-08-24 PROCEDURE — 86922 COMPATIBILITY TEST ANTIGLOB: CPT

## 2023-08-24 PROCEDURE — 97530 THERAPEUTIC ACTIVITIES: CPT

## 2023-08-24 PROCEDURE — 86900 BLOOD TYPING SEROLOGIC ABO: CPT

## 2023-08-24 PROCEDURE — 86850 RBC ANTIBODY SCREEN: CPT | Performed by: INTERNAL MEDICINE

## 2023-08-24 PROCEDURE — 97535 SELF CARE MNGMENT TRAINING: CPT

## 2023-08-24 PROCEDURE — 86870 RBC ANTIBODY IDENTIFICATION: CPT

## 2023-08-24 PROCEDURE — 86905 BLOOD TYPING RBC ANTIGENS: CPT | Performed by: INTERNAL MEDICINE

## 2023-08-24 PROCEDURE — 85025 COMPLETE CBC W/AUTO DIFF WBC: CPT | Performed by: INTERNAL MEDICINE

## 2023-08-24 PROCEDURE — 86920 COMPATIBILITY TEST SPIN: CPT

## 2023-08-24 PROCEDURE — 85652 RBC SED RATE AUTOMATED: CPT | Performed by: INTERNAL MEDICINE

## 2023-08-24 PROCEDURE — 99232 SBSQ HOSP IP/OBS MODERATE 35: CPT | Performed by: OTOLARYNGOLOGY

## 2023-08-24 PROCEDURE — 97110 THERAPEUTIC EXERCISES: CPT

## 2023-08-24 RX ORDER — POTASSIUM CHLORIDE 750 MG/1
40 CAPSULE, EXTENDED RELEASE ORAL
Status: DISPENSED | OUTPATIENT
Start: 2023-08-24 | End: 2023-08-25

## 2023-08-24 NOTE — PROGRESS NOTES
ANISH Carbajal APRN        Internal Medicine Progress Note    8/24/2023   13:04 CDT    Name:  Emili Schmitt  MRN:    [id number]     Acct:     [id number]   Room:  [address] [place] [address] Day: 0     Admit Date: 8/21/2023  6:54 PM  PCP: Remedios Qiu APRN    Subjective:     C/C: need for continued rehabilitation    Interval History: Status:  stayed the same. Resting in bed.  at bedside. Afebrile. Tolerating trach collar with 28% 02 with PMV. Having some pain to wound. Blood sugars stable. Hgb down to 6.7 and tolerated transfusion this am.     Review of Systems   Constitutional: Positive for malaise/fatigue. Negative for chills, decreased appetite, weight gain and weight loss.   HENT:  Negative for congestion, ear discharge, hoarse voice and tinnitus.    Eyes:  Negative for blurred vision, discharge, visual disturbance and visual halos.   Cardiovascular:  Positive for dyspnea on exertion. Negative for chest pain, claudication, irregular heartbeat, leg swelling, orthopnea and paroxysmal nocturnal dyspnea.   Respiratory:  Positive for cough and shortness of breath. Negative for sputum production and wheezing.    Endocrine: Negative for cold intolerance, heat intolerance and polyuria.   Hematologic/Lymphatic: Negative for adenopathy. Does not bruise/bleed easily.   Skin:  Positive for poor wound healing. Negative for dry skin, itching and suspicious lesions.   Musculoskeletal:  Negative for arthritis, back pain, falls, joint pain, muscle weakness and myalgias.   Gastrointestinal:  Negative for abdominal pain, constipation, diarrhea, dysphagia and hematemesis.   Genitourinary:  Negative for bladder incontinence, dysuria and frequency.   Neurological:  Positive for weakness. Negative for aphonia, disturbances in coordination and dizziness.   Psychiatric/Behavioral:  Negative for altered mental status, depression, memory loss and substance abuse. The patient does not have  insomnia and is not nervous/anxious.        Medications:     Allergies:   Allergies   Allergen Reactions    Hydrocodone Itching     Severe itching    Tylenol [Acetaminophen] Itching       Current Meds:   Current Facility-Administered Medications:     acetylcysteine (MUCOMYST) 20 % nebulizer solution 3 mL, 3 mL, Nebulization, BID - RT, Montez Dan MD    albuterol (PROVENTIL) nebulizer solution 0.083% 2.5 mg/3mL, 2.5 mg, Nebulization, Q6H PRN, Montez Dan MD    ALPRAZolam (XANAX) tablet 0.25 mg, 0.25 mg, Oral, BID PRN, Marilee Botello APRN    amiodarone (PACERONE) tablet 200 mg, 200 mg, Oral, Q24H, Montez Dan MD    apixaban (ELIQUIS) tablet 5 mg, 5 mg, Oral, Q12H, Montez Dan MD    bisacodyl (DULCOLAX) suppository 10 mg, 10 mg, Rectal, Daily PRN, Montez Dan MD    busPIRone (BUSPAR) tablet 5 mg, 5 mg, Oral, TID With Meals, Montez Dan MD    DULoxetine (CYMBALTA) DR capsule 60 mg, 60 mg, Oral, Daily, Montez Dan MD    gabapentin (NEURONTIN) capsule 200 mg, 200 mg, Oral, TID, Montez Dan MD    hydrALAZINE (APRESOLINE) injection 5 mg, 5 mg, Intravenous, Q4H PRN, Montez Dan MD    ipratropium-albuterol (DUO-NEB) nebulizer solution 3 mL, 3 mL, Nebulization, 4x Daily - RT, Montez Dan MD    lidocaine (LIDODERM) 5 % 2 patch, 2 patch, Transdermal, Q24H, Montez Dan MD    metoprolol succinate XL (TOPROL-XL) 24 hr tablet 50 mg, 50 mg, Oral, Q24H, Montez Dan MD    OLANZapine (zyPREXA) tablet 5 mg, 5 mg, Oral, BID, Montez Dan MD    ondansetron (ZOFRAN) tablet 4 mg, 4 mg, Oral, Q6H PRN **OR** ondansetron (ZOFRAN) injection 4 mg, 4 mg, Intravenous, Q6H PRN, Montez Dan MD    oxyCODONE (ROXICODONE) immediate release tablet 5 mg, 5 mg, Oral, Q6H PRN, Montez Dan MD    pantoprazole (PROTONIX) EC tablet 40 mg, 40 mg, Oral, BID, Montez Dan MD     "polyethylene glycol (MIRALAX) packet 17 g, 17 g, Oral, BID, Montez Dan MD    potassium chloride (MICRO-K) CR capsule 40 mEq, 40 mEq, Oral, Once, Marilee Botello APRN    sennosides-docusate (PERICOLACE) 8.6-50 MG per tablet 2 tablet, 2 tablet, Oral, BID, Montez Dan MD    simethicone (MYLICON) chewable tablet 80 mg, 80 mg, Oral, 4x Daily, Montez Dan MD    sodium hypochlorite (HYSEPT) 0.25 % topical solution, , Topical, Q12H, Montez Dan MD    traZODone (DESYREL) tablet 150 mg, 150 mg, Oral, Nightly, Montez Dan MD    vitamin D3 tablet 5,000 Units, 5,000 Units, Oral, Daily, Montez Dan MD    Data:     Code Status:    There are no questions and answers to display.       Family History   Problem Relation Age of Onset    Breast cancer Neg Hx     Ovarian cancer Neg Hx     Uterine cancer Neg Hx     Colon cancer Neg Hx     Melanoma Neg Hx        Social History     Socioeconomic History    Marital status:    Tobacco Use    Smoking status: Every Day     Packs/day: 1.00     Types: Cigarettes    Smokeless tobacco: Never   Substance and Sexual Activity    Alcohol use: Never    Drug use: Never    Sexual activity: Yes     Partners: Male     Birth control/protection: Surgical       Vitals:  Ht 172.7 cm (68\")   Wt 88.4 kg (194 lb 14.4 oz)   BMI 29.63 kg/m²   T 97.7 P 65 R 20 /69 Sp02 93% (trach collar)            I/O (24Hr):  No intake or output data in the 24 hours ending 08/24/23 1304    Labs and imaging:      Recent Results (from the past 12 hour(s))   Basic Metabolic Panel    Collection Time: 08/24/23  4:09 AM    Specimen: Blood   Result Value Ref Range    Glucose 125 (H) 65 - 99 mg/dL    BUN 14 8 - 23 mg/dL    Creatinine 0.32 (L) 0.57 - 1.00 mg/dL    Sodium 138 136 - 145 mmol/L    Potassium 3.6 3.5 - 5.2 mmol/L    Chloride 105 98 - 107 mmol/L    CO2 26.0 22.0 - 29.0 mmol/L    Calcium 9.3 8.6 - 10.5 mg/dL    BUN/Creatinine Ratio 43.8 (H) 7.0 " - 25.0    Anion Gap 7.0 5.0 - 15.0 mmol/L    eGFR 119.0 >60.0 mL/min/1.73   Sedimentation Rate    Collection Time: 08/24/23  4:09 AM    Specimen: Blood   Result Value Ref Range    Sed Rate 34 (H) 0 - 30 mm/hr   C-reactive Protein    Collection Time: 08/24/23  4:09 AM    Specimen: Blood   Result Value Ref Range    C-Reactive Protein 2.82 (H) 0.00 - 0.50 mg/dL   CBC Auto Differential    Collection Time: 08/24/23  4:09 AM    Specimen: Blood   Result Value Ref Range    WBC 7.59 3.40 - 10.80 10*3/mm3    RBC 2.27 (L) 3.77 - 5.28 10*6/mm3    Hemoglobin 6.7 (C) 12.0 - 15.9 g/dL    Hematocrit 22.2 (L) 34.0 - 46.6 %    MCV 97.8 (H) 79.0 - 97.0 fL    MCH 29.5 26.6 - 33.0 pg    MCHC 30.2 (L) 31.5 - 35.7 g/dL    RDW 16.0 (H) 12.3 - 15.4 %    RDW-SD 56.6 (H) 37.0 - 54.0 fl    MPV 10.1 6.0 - 12.0 fL    Platelets 335 140 - 450 10*3/mm3   Manual Differential    Collection Time: 08/24/23  4:09 AM    Specimen: Blood   Result Value Ref Range    Neutrophil % 69.0 42.7 - 76.0 %    Lymphocyte % 22.0 19.6 - 45.3 %    Monocyte % 5.0 5.0 - 12.0 %    Eosinophil % 2.0 0.3 - 6.2 %    Bands %  2.0 0.0 - 5.0 %    Neutrophils Absolute 5.39 1.70 - 7.00 10*3/mm3    Lymphocytes Absolute 1.67 0.70 - 3.10 10*3/mm3    Monocytes Absolute 0.38 0.10 - 0.90 10*3/mm3    Eosinophils Absolute 0.15 0.00 - 0.40 10*3/mm3    Anisocytosis Slight/1+ None Seen    Basophilic Stippling Slight/1+ None Seen    Poikilocytes Slight/1+ None Seen    Polychromasia Slight/1+ None Seen    WBC Morphology Normal Normal    Platelet Morphology Normal Normal   Type & Screen    Collection Time: 08/24/23  6:05 AM    Specimen: Blood   Result Value Ref Range    ABO Type O     RH type Positive     Antibody Screen Positive     T&S Expiration Date 8/27/2023 11:59:59 PM    Antibody Identification    Collection Time: 08/24/23  6:05 AM    Specimen: Blood   Result Value Ref Range    Anti-E ANTI-E    Patient Antigen Type    Collection Time: 08/24/23  6:05 AM    Specimen: Blood   Result Value  Ref Range    BIG E Negative    Prepare RBC, 1 Units    Collection Time: 08/24/23  9:00 AM   Result Value Ref Range    Product Code E9286Y46     Unit Number [id number]     UNIT  ABO O     UNIT  RH POS     Crossmatch Interpretation Compatible     Dispense Status IS     Blood Expiration Date [id number]     Blood Type Barcode 5100                                Physical Examination:        Physical Exam  Vitals and nursing note reviewed.   Constitutional:       Appearance: Normal appearance.   HENT:      Head: Normocephalic and atraumatic.      Right Ear: External ear normal.      Left Ear: External ear normal.      Nose: Nose normal.      Mouth/Throat:      Mouth: Mucous membranes are moist.      Pharynx: Oropharynx is clear.   Eyes:      Extraocular Movements: Extraocular movements intact.      Conjunctiva/sclera: Conjunctivae normal.      Pupils: Pupils are equal, round, and reactive to light.   Neck:      Comments: Trach to collar with PMV  Cardiovascular:      Rate and Rhythm: Normal rate and regular rhythm.      Pulses: Normal pulses.      Heart sounds: Normal heart sounds.   Pulmonary:      Effort: Pulmonary effort is normal.      Breath sounds: Normal breath sounds.   Abdominal:      General: Bowel sounds are normal.      Palpations: Abdomen is soft.   Musculoskeletal:      Comments: Generalized weakness   Skin:     Comments: Dressing c.d.i   Neurological:      Mental Status: She is alert and oriented to person, place, and time.   Psychiatric:         Mood and Affect: Mood normal.         Behavior: Behavior normal.         Assessment:             Acute tracheostomy management    Acute on chronic respiratory failure with hypoxia and hypercapnia    Past Medical History:   Diagnosis Date    Arthritis     Interstitial cystitis     Macular dystrophy     Neuropathy     Restless leg syndrome         Plan:        Acute hypoxic respiratory failure s/p tracheostomy tube  PAF  Acute RUE DVT  Chronic  anticoagulation  Multifactorial anemia  Peripheral neuropathy  DM2 with hyperglycemia not on long term insulin  HTN  Anxiety  GERD  Constipation  Unstageable decubitus ulcer to coccyx  Continue current treatment. Monitor counts. Increase activity. Labs in am. Oxygen weaning as tolerated. Trach management per ENT. Wound care per wound care team. Aggressive therapies as tolerated. Maintain patient safety. Transfuse today and prn. Continue pain management efforts.       Electronically signed by BHAVESH Mills on 8/24/2023 at 13:04 CDT

## 2023-08-24 NOTE — PROGRESS NOTES
[facility] Otolaryngology Head and Neck Surgery  INPATIENT PROGRESS NOTE    Patient Name: Emili Schmitt  : 1960   MRN: [id number]  Date of Admission: 2023  Today's Date: 23  Length of Stay: 0  [unfilled]   Montez Dan MD   Primary Care Physician: Remedios Qiu APRN  Surgical Procedures Since Admission:    Subjective   Subjective   Chief Complaint: Tracheostomy management  HPI   Accompanied by: No one  Since last examined, Emili Schmitt has remained stable.  She is currently wearing Passy-Greenbelt valve.  She states she is swallowing well.  She has no issues with talking or breathing.  RT reports patient still has significant secretions although decreased this morning.  She is tolerating Passy-Dylon valve well.  Patient is seen, chart is reviewed    Review of Systems   No change from prior inquiry  All pertinent negatives and positives are as above. All other systems have been reviewed and are negative unless otherwise stated.   Objective   Objective   Vitals:        Physical Exam      Result Review    Result Review:  I have personally reviewed the results from the time of this admission to 2023 08:16 CDT and agree with these findings:  []  Laboratory  []  Microbiology  []  Radiology  []  EKG/Telemetry   []  Cardiology/Vascular   []  Pathology  []  Old records  []  Other:  Most notable findings include: Elevated inflammatory factors, elevated blood glucose, anemia, abnormal differential  Progress Notes by Montez Dan MD (2023 09:35)    CBC & Differential (2023 04:09)    Basic Metabolic Panel (2023 04:09)    Sedimentation Rate (2023 04:09)    C-reactive Protein (2023 04:09)    Assessment & Plan   Assessment / Plan   Brief Patient Summary:  Emili Schmitt is a 62 y.o. female who remained stable with a trach in position.  I will continue current tracheostomy management.  She is tolerating Passy-Greenbelt  valve very well.  I will begin intermittent capping and see if she tolerates this.  She does have a cuffed trach in position.  If she does not tolerate capping with a cuffed trach in position, I will downsized to #6 uncuffed.  If she does tolerate this, I will consider either decannulation or discussion with her regarding trach for sleep apnea.    Active Hospital Problems:   Active Hospital Problems    Diagnosis     Acute tracheostomy management     Acute on chronic respiratory failure with hypoxia and hypercapnia      Plan:   Tracheostomy management-the patient is stable trach in position.  I will continue trach weaning.  She will continue Passy-Dylon and capping trials.  I will continue current tracheostomy management.  Pulmonary hygiene-the patient continues with thick secretions.  She currently needs a trach for suction.  I will continue her pulmonary hygiene via the tracheostomy tube while weaning the tracheostomy.  Sleep apnea-suspected.  This may be treatable with the trach in position.  I will assess while the patient is in LTAC.  I have discussed the possibility of longer-term trach for sleep apnea.  She could then obtain sleep study with the trach capped as an outpatient.  Discussed Plan with patient, RT  Following patient as in-patient. Further recommendations will be made based on serial examinations.    Medications/Orders for this encounter: No new medications ordered.  Orders-Capping trial    Discharge Planning: Per primary team        DVT prophylaxis:  Medical DVT prophylaxis orders are present.     Electronically signed by Jef Velázquez Jr, MD, 08/24/23, 8:16 AM CDT.

## 2023-08-25 LAB
ANION GAP SERPL CALCULATED.3IONS-SCNC: 7 MMOL/L (ref 5–15)
ANISOCYTOSIS BLD QL: ABNORMAL
BH BB BLOOD EXPIRATION DATE: NORMAL
BH BB BLOOD TYPE BARCODE: 5100
BH BB DISPENSE STATUS: NORMAL
BH BB PRODUCT CODE: NORMAL
BH BB UNIT NUMBER: NORMAL
BUN SERPL-MCNC: 9 MG/DL (ref 8–23)
BUN/CREAT SERPL: 31 (ref 7–25)
CALCIUM SPEC-SCNC: 10 MG/DL (ref 8.6–10.5)
CHLORIDE SERPL-SCNC: 105 MMOL/L (ref 98–107)
CO2 SERPL-SCNC: 27 MMOL/L (ref 22–29)
CREAT SERPL-MCNC: 0.29 MG/DL (ref 0.57–1)
CROSSMATCH INTERPRETATION: NORMAL
DEPRECATED RDW RBC AUTO: 58 FL (ref 37–54)
EGFRCR SERPLBLD CKD-EPI 2021: 121.9 ML/MIN/1.73
EOSINOPHIL # BLD MANUAL: 0.08 10*3/MM3 (ref 0–0.4)
EOSINOPHIL NFR BLD MANUAL: 1 % (ref 0.3–6.2)
ERYTHROCYTE [DISTWIDTH] IN BLOOD BY AUTOMATED COUNT: 16.7 % (ref 12.3–15.4)
GLUCOSE SERPL-MCNC: 110 MG/DL (ref 65–99)
HCT VFR BLD AUTO: 27.6 % (ref 34–46.6)
HGB BLD-MCNC: 8.2 G/DL (ref 12–15.9)
LYMPHOCYTES # BLD MANUAL: 2.14 10*3/MM3 (ref 0.7–3.1)
LYMPHOCYTES NFR BLD MANUAL: 3 % (ref 5–12)
MCH RBC QN AUTO: 28.8 PG (ref 26.6–33)
MCHC RBC AUTO-ENTMCNC: 29.7 G/DL (ref 31.5–35.7)
MCV RBC AUTO: 96.8 FL (ref 79–97)
MONOCYTES # BLD: 0.25 10*3/MM3 (ref 0.1–0.9)
NEUTROPHILS # BLD AUTO: 5.77 10*3/MM3 (ref 1.7–7)
NEUTROPHILS NFR BLD MANUAL: 66 % (ref 42.7–76)
NEUTS BAND NFR BLD MANUAL: 4 % (ref 0–5)
PLAT MORPH BLD: NORMAL
PLATELET # BLD AUTO: 353 10*3/MM3 (ref 140–450)
PMV BLD AUTO: 9.8 FL (ref 6–12)
POLYCHROMASIA BLD QL SMEAR: ABNORMAL
POTASSIUM SERPL-SCNC: 4 MMOL/L (ref 3.5–5.2)
RBC # BLD AUTO: 2.85 10*6/MM3 (ref 3.77–5.28)
SODIUM SERPL-SCNC: 139 MMOL/L (ref 136–145)
UNIT  ABO: NORMAL
UNIT  RH: NORMAL
VARIANT LYMPHS NFR BLD MANUAL: 26 % (ref 19.6–45.3)
WBC MORPH BLD: NORMAL
WBC NRBC COR # BLD: 8.24 10*3/MM3 (ref 3.4–10.8)

## 2023-08-25 PROCEDURE — 25010000002 MORPHINE PER 10 MG: Performed by: NURSE PRACTITIONER

## 2023-08-25 PROCEDURE — 31502 CHANGE OF WINDPIPE AIRWAY: CPT | Performed by: OTOLARYNGOLOGY

## 2023-08-25 PROCEDURE — 97110 THERAPEUTIC EXERCISES: CPT

## 2023-08-25 PROCEDURE — 97530 THERAPEUTIC ACTIVITIES: CPT

## 2023-08-25 PROCEDURE — 85025 COMPLETE CBC W/AUTO DIFF WBC: CPT | Performed by: INTERNAL MEDICINE

## 2023-08-25 PROCEDURE — 85007 BL SMEAR W/DIFF WBC COUNT: CPT | Performed by: INTERNAL MEDICINE

## 2023-08-25 PROCEDURE — 97535 SELF CARE MNGMENT TRAINING: CPT

## 2023-08-25 PROCEDURE — 80048 BASIC METABOLIC PNL TOTAL CA: CPT | Performed by: NURSE PRACTITIONER

## 2023-08-25 PROCEDURE — 31575 DIAGNOSTIC LARYNGOSCOPY: CPT | Performed by: OTOLARYNGOLOGY

## 2023-08-25 PROCEDURE — 31615 TRCHEOBRNCHSC EST TRACHS INC: CPT | Performed by: OTOLARYNGOLOGY

## 2023-08-25 RX ORDER — MORPHINE SULFATE 2 MG/ML
2 INJECTION, SOLUTION INTRAMUSCULAR; INTRAVENOUS DAILY PRN
Status: DISCONTINUED | OUTPATIENT
Start: 2023-08-25 | End: 2023-08-25 | Stop reason: SDUPTHER

## 2023-08-25 RX ORDER — MORPHINE SULFATE 2 MG/ML
2 INJECTION, SOLUTION INTRAMUSCULAR; INTRAVENOUS
Status: DISPENSED | OUTPATIENT
Start: 2023-08-29 | End: 2023-09-05

## 2023-08-25 NOTE — PROGRESS NOTES
ANISH Carbajal APRN        Internal Medicine Progress Note    8/25/2023   09:51 CDT    Name:  Emili Schmitt  MRN:    [id number]     Acct:     [id number]   Room:  [address] [place] [address] Day: 0     Admit Date: 8/21/2023  6:54 PM  PCP: Remedios Qiu APRN    Subjective:     C/C: need for continued rehabilitation    Interval History: Status:  stayed the same. Resting in bed.  at bedside. Afebrile. Tolerating trach collar to room air with PMV. Having some pain to wound. Blood sugars stable. Hgb up to 8.2 following transfusion. Counts otherwise stable.      Review of Systems   Constitutional: Positive for malaise/fatigue. Negative for chills, decreased appetite, weight gain and weight loss.   HENT:  Negative for congestion, ear discharge, hoarse voice and tinnitus.    Eyes:  Negative for blurred vision, discharge, visual disturbance and visual halos.   Cardiovascular:  Positive for dyspnea on exertion. Negative for chest pain, claudication, irregular heartbeat, leg swelling, orthopnea and paroxysmal nocturnal dyspnea.   Respiratory:  Positive for cough and shortness of breath. Negative for sputum production and wheezing.    Endocrine: Negative for cold intolerance, heat intolerance and polyuria.   Hematologic/Lymphatic: Negative for adenopathy. Does not bruise/bleed easily.   Skin:  Positive for poor wound healing. Negative for dry skin, itching and suspicious lesions.   Musculoskeletal:  Negative for arthritis, back pain, falls, joint pain, muscle weakness and myalgias.   Gastrointestinal:  Negative for abdominal pain, constipation, diarrhea, dysphagia and hematemesis.   Genitourinary:  Negative for bladder incontinence, dysuria and frequency.   Neurological:  Positive for weakness. Negative for aphonia, disturbances in coordination and dizziness.   Psychiatric/Behavioral:  Negative for altered mental status, depression, memory loss and substance abuse. The patient does  not have insomnia and is not nervous/anxious.        Medications:     Allergies:   Allergies   Allergen Reactions    Hydrocodone Itching     Severe itching    Tylenol [Acetaminophen] Itching       Current Meds:   Current Facility-Administered Medications:     acetylcysteine (MUCOMYST) 20 % nebulizer solution 3 mL, 3 mL, Nebulization, BID - RT, Montez Dan MD    albuterol (PROVENTIL) nebulizer solution 0.083% 2.5 mg/3mL, 2.5 mg, Nebulization, Q6H PRN, Montez Dan MD    ALPRAZolam (XANAX) tablet 0.25 mg, 0.25 mg, Oral, BID PRN, Marilee Botello APRN    amiodarone (PACERONE) tablet 200 mg, 200 mg, Oral, Q24H, Montez Dan MD    apixaban (ELIQUIS) tablet 5 mg, 5 mg, Oral, Q12H, Montez Dan MD    bisacodyl (DULCOLAX) suppository 10 mg, 10 mg, Rectal, Daily PRN, Montez Dan MD    busPIRone (BUSPAR) tablet 5 mg, 5 mg, Oral, TID With Meals, Montez Dan MD    DULoxetine (CYMBALTA) DR capsule 60 mg, 60 mg, Oral, Daily, Montez Dan MD    gabapentin (NEURONTIN) capsule 200 mg, 200 mg, Oral, TID, Montez Dan MD    hydrALAZINE (APRESOLINE) injection 5 mg, 5 mg, Intravenous, Q4H PRN, Montez Dan MD    ipratropium-albuterol (DUO-NEB) nebulizer solution 3 mL, 3 mL, Nebulization, 4x Daily - RT, Montez Dan MD    lidocaine (LIDODERM) 5 % 2 patch, 2 patch, Transdermal, Q24H, Montez Dan MD    metoprolol succinate XL (TOPROL-XL) 24 hr tablet 50 mg, 50 mg, Oral, Q24H, Montez Dan MD    OLANZapine (zyPREXA) tablet 5 mg, 5 mg, Oral, BID, Montez Dan MD    ondansetron (ZOFRAN) tablet 4 mg, 4 mg, Oral, Q6H PRN **OR** ondansetron (ZOFRAN) injection 4 mg, 4 mg, Intravenous, Q6H PRN, Montez Dan MD    oxyCODONE (ROXICODONE) immediate release tablet 5 mg, 5 mg, Oral, Q6H PRN, Montez Dan MD    pantoprazole (PROTONIX) EC tablet 40 mg, 40 mg, Oral, BID, Montez Dan MD     "polyethylene glycol (MIRALAX) packet 17 g, 17 g, Oral, BID, Montez Dan MD    sennosides-docusate (PERICOLACE) 8.6-50 MG per tablet 2 tablet, 2 tablet, Oral, BID, Montez Dan MD    simethicone (MYLICON) chewable tablet 80 mg, 80 mg, Oral, 4x Daily, Montez Dan MD    sodium hypochlorite (HYSEPT) 0.25 % topical solution, , Topical, Q12H, Montez Dan MD    traZODone (DESYREL) tablet 150 mg, 150 mg, Oral, Nightly, Montez Dan MD    vitamin D3 tablet 5,000 Units, 5,000 Units, Oral, Daily, Montez Dan MD    Data:     Code Status:    There are no questions and answers to display.       Family History   Problem Relation Age of Onset    Breast cancer Neg Hx     Ovarian cancer Neg Hx     Uterine cancer Neg Hx     Colon cancer Neg Hx     Melanoma Neg Hx        Social History     Socioeconomic History    Marital status:    Tobacco Use    Smoking status: Every Day     Packs/day: 1.00     Types: Cigarettes    Smokeless tobacco: Never   Substance and Sexual Activity    Alcohol use: Never    Drug use: Never    Sexual activity: Yes     Partners: Male     Birth control/protection: Surgical       Vitals:  Ht 172.7 cm (68\")   Wt 88.4 kg (194 lb 14.4 oz)   BMI 29.63 kg/m²   T 97.7 P 65 R 20 /69 Sp02 93% (room air)            I/O (24Hr):  No intake or output data in the 24 hours ending 08/25/23 0951    Labs and imaging:      Recent Results (from the past 12 hour(s))   Basic Metabolic Panel    Collection Time: 08/25/23  5:21 AM    Specimen: Blood   Result Value Ref Range    Glucose 110 (H) 65 - 99 mg/dL    BUN 9 8 - 23 mg/dL    Creatinine 0.29 (L) 0.57 - 1.00 mg/dL    Sodium 139 136 - 145 mmol/L    Potassium 4.0 3.5 - 5.2 mmol/L    Chloride 105 98 - 107 mmol/L    CO2 27.0 22.0 - 29.0 mmol/L    Calcium 10.0 8.6 - 10.5 mg/dL    BUN/Creatinine Ratio 31.0 (H) 7.0 - 25.0    Anion Gap 7.0 5.0 - 15.0 mmol/L    eGFR 121.9 >60.0 mL/min/1.73   CBC Auto Differential    " Collection Time: 08/25/23  5:21 AM    Specimen: Blood   Result Value Ref Range    WBC 8.24 3.40 - 10.80 10*3/mm3    RBC 2.85 (L) 3.77 - 5.28 10*6/mm3    Hemoglobin 8.2 (L) 12.0 - 15.9 g/dL    Hematocrit 27.6 (L) 34.0 - 46.6 %    MCV 96.8 79.0 - 97.0 fL    MCH 28.8 26.6 - 33.0 pg    MCHC 29.7 (L) 31.5 - 35.7 g/dL    RDW 16.7 (H) 12.3 - 15.4 %    RDW-SD 58.0 (H) 37.0 - 54.0 fl    MPV 9.8 6.0 - 12.0 fL    Platelets 353 140 - 450 10*3/mm3   Manual Differential    Collection Time: 08/25/23  5:21 AM    Specimen: Blood   Result Value Ref Range    Neutrophil % 66.0 42.7 - 76.0 %    Lymphocyte % 26.0 19.6 - 45.3 %    Monocyte % 3.0 (L) 5.0 - 12.0 %    Eosinophil % 1.0 0.3 - 6.2 %    Bands %  4.0 0.0 - 5.0 %    Neutrophils Absolute 5.77 1.70 - 7.00 10*3/mm3    Lymphocytes Absolute 2.14 0.70 - 3.10 10*3/mm3    Monocytes Absolute 0.25 0.10 - 0.90 10*3/mm3    Eosinophils Absolute 0.08 0.00 - 0.40 10*3/mm3    Anisocytosis Slight/1+ None Seen    Polychromasia Slight/1+ None Seen    WBC Morphology Normal Normal    Platelet Morphology Normal Normal   Prepare RBC, 1 Units    Collection Time: 08/25/23  5:40 AM   Result Value Ref Range    Product Code [id number]     Unit Number [id number]     UNIT  ABO O     UNIT  RH POS     Crossmatch Interpretation Compatible     Dispense Status PT     Blood Expiration Date [id number]     Blood Type Barcode 5100                                Physical Examination:        Physical Exam  Vitals and nursing note reviewed.   Constitutional:       Appearance: Normal appearance.   HENT:      Head: Normocephalic and atraumatic.      Right Ear: External ear normal.      Left Ear: External ear normal.      Nose: Nose normal.      Mouth/Throat:      Mouth: Mucous membranes are moist.      Pharynx: Oropharynx is clear.   Eyes:      Extraocular Movements: Extraocular movements intact.      Conjunctiva/sclera: Conjunctivae normal.      Pupils: Pupils are equal, round, and reactive to light.   Neck:       Comments: Trach to  PMV  Cardiovascular:      Rate and Rhythm: Normal rate and regular rhythm.      Pulses: Normal pulses.      Heart sounds: Normal heart sounds.   Pulmonary:      Effort: Pulmonary effort is normal.      Breath sounds: Normal breath sounds.   Abdominal:      General: Bowel sounds are normal.      Palpations: Abdomen is soft.   Musculoskeletal:      Comments: Generalized weakness   Skin:     Comments: Dressing c.d.i   Neurological:      Mental Status: She is alert and oriented to person, place, and time.   Psychiatric:         Mood and Affect: Mood normal.         Behavior: Behavior normal.         Assessment:             Acute tracheostomy management    Acute on chronic respiratory failure with hypoxia and hypercapnia    Past Medical History:   Diagnosis Date    Arthritis     Interstitial cystitis     Macular dystrophy     Neuropathy     Restless leg syndrome         Plan:        Acute hypoxic respiratory failure s/p tracheostomy tube  PAF  Acute RUE DVT  Chronic anticoagulation  Multifactorial anemia  Peripheral neuropathy  DM2 with hyperglycemia not on long term insulin  HTN  Anxiety  GERD  Constipation  Unstageable decubitus ulcer to coccyx  Continue current treatment. Monitor counts. Increase activity. Labs Monday. Oxygen weaning as tolerated. Trach management per ENT. Wound care per wound care team. Aggressive therapies as tolerated. Maintain patient safety.  Continue pain management efforts.       Electronically signed by BHAVESH Mills on 8/25/2023 at 09:51 CDT     I have discussed the care of Emili Schmitt, including pertinent history and exam findings, with the nurse practitioner.    I have seen and examined the patient and the key elements of all parts of the encounter have been performed by me.  I agree with the assessment, plan and orders as documented by BHAVESH Sheehan, after I modified the exam findings and the plan of treatments and the final version is my  approved version of the assessment.        Electronically signed by Montez Dan MD on 8/25/2023 at 20:32 CDT

## 2023-08-25 NOTE — PROCEDURES
[facility] Otolaryngology Head and Neck Surgery  PROCEDURE NOTE    Anesthesia: none    Indications for Procedure:     Acute tracheostomy management    Acute on chronic respiratory failure with hypoxia and hypercapnia       Endoscopy Type: Flexible Laryngoscopy    Procedure Details:    The patient was placed in an upright position.  The laryngoscope was passed right nasal passge.  The nasal cavities, nasopharynx, oropharynx, hypopharynx, and larynx were all examined.  Vocal cords were examined during respiration and phonation.  The following findings were noted:    Findings:   LARYNGOSCOPY FINDINGS :    NASOPHARYNX:     adenoids  flat, no lesions, Eustachian tubes normal, without lesions, palate with normal mobility without lesions.  OROPHARYNX:         Redundant          BILATERAL: Mild to moderate    Posterior walls:         normal, no lesions    BASEOF TONGUE:          prolapse  moderate    LINGUAL TONSILS:          BILATERAL: Moderate    VALLECULA:        BILATERAL: Clear  EPIGLOTTIS:    Position: Mildly retroverted    Color: Pale    Mucosa: Intact    Shape: Normal  HYPOPHARYNX:    Lateral walls:         BILATERAL: Mildly redundant    Posterior wall:       normal. no lesions  ARYEPIGLOTTIC FOLDS:     normal mucosa, no lesions  ARYTENOIDS:    normal position, normal size, normal mobility, no lesions, tower size normal  FALSE CORDS:     normal mucosa, no lesions, normal mobility  Bilateral  TRUE VOCAL FOLDS:      normal appearance, mobility, no lesions  Bilateral  GLOTTIS:     normal closure, with good cord apposition  SUBGLOTTIS:     unobstructed, patent, no lesions    Condition:  Stable.  Patient tolerated procedure well.    Complications:  None    Endoscopy Type: Flexible Tracheobronchoscopy    Indications for Procedure:   Tracheostomy evaluation  Trachea evaluation    Procedure Details:    The patient was placed in the sitting position.  The SCOPE TYPE: Flexible laryngoscope was passed  both via the tracheostome and the tracheostomy tube .  The trachea from the subglottis to the rehana was examined.  A retrograde examination of the Vocal cords and subglottis was carried out during respiration and phonation.  The following findings were noted:    Findings:   TRACHEO-BRONCHOSCOPY:    Stoma: Healing appropriately   Distal Trachea: Intact mucosa with mildly scattered secretions, rehana sharp, MSB patent   Proximal trachea: Intact mucosa, no stenosis, mild scattered secretions   True Vocal cords: Appear normal with normal mobility, no penetration of secretions   Subglottis: Intact with no stenosis     Condition:  Stable.  Patient tolerated procedure well.    Complications:  None    Procedure:  Tracheostomy tube change    Procedure Details:    The patient's respiratory status was assessed.  The trach tube in position was assessed.  The patient was positioned.  The trach tube was removed without difficulty. The stoma and trachea were inspected.    A new tracheostomy tube was re- inserted. Trach tube position was confirmed with scope. Trach tube type:  Shiley trach tube Size  6 Uncuffed DIC, flexible shaft    Findings: See above    Condition:  Stable.  Patient tolerated procedure well.    Complications:  None    Post-procedure instructions reviewed with Patient

## 2023-08-25 NOTE — PROGRESS NOTES
[facility] Otolaryngology Head and Neck Surgery  INPATIENT PROGRESS NOTE    Patient Name: Emili Schmitt  : 1960   MRN: [id number]  Date of Admission: 2023  Today's Date: 23  Length of Stay: 0  [unfilled]   Montez Dan MD   Primary Care Physician: Remedios Qiu APRN  Surgical Procedures Since Admission:    Subjective   Subjective   Chief Complaint: Tracheostomy management  HPI   Accompanied by: No one  Since last examined, Emili Schmitt has remained stable with a trach in position.  She denies any trouble breathing.  She is vocalizing well.  She is stable to take p.o. without difficulty.  RT reports patient has stable trach in position.  She still has intermittent secretions.  Patient is seen, chart is reviewed    Review of Systems   No change from prior inquiry  All pertinent negatives and positives are as above. All other systems have been reviewed and are negative unless otherwise stated.   Objective   Objective   Vitals:        Physical Exam  Vitals reviewed.   Constitutional:       Appearance: Normal appearance. She is well-developed. She is obese.      Interventions: She is intubated.      Comments: Lying in bed, trach in position, trach collar, Passy-[place] in position.  Appears much improved over yesterday.   HENT:      Head: Normocephalic and atraumatic.      Right Ear: Hearing and external ear normal.      Left Ear: Hearing and external ear normal.      Nose: Nose normal.      Mouth/Throat:      Lips: Pink.      Mouth: Mucous membranes are dry.      Dentition: Normal dentition. No gum lesions.      Tongue: No lesions. Tongue does not deviate from midline.      Palate: No mass and lesions.      Pharynx: Oropharynx is clear. Uvula midline.   Eyes:      General: Lids are normal. Gaze aligned appropriately.      Extraocular Movements: Extraocular movements intact.      Conjunctiva/sclera: Conjunctivae normal.   Neck:      Trachea: Tracheostomy  present.      Comments: my tube type: Shiley #6 cuffed DIC, flexible shaft    Stoma: Healing appropriately    Secretions: Minimal    [place] valve: In place    Voice: Weak  Date placed: 8/8/2023  Date last changed: 8/25/2023, Shiley #6 uncuffed DIC, flexible shaft  Refer to scope note for detail    Pulmonary:      Effort: Pulmonary effort is normal. No tachypnea, respiratory distress or retractions. She is intubated.      Breath sounds: No stridor.      Comments: Trach in position, trach collar, Passy-Dylon valve  Musculoskeletal:      Cervical back: No rigidity or crepitus. Decreased range of motion.   Lymphadenopathy:      Cervical: No cervical adenopathy.   Skin:     Findings: No rash.   Neurological:      General: No focal deficit present.      Mental Status: She is alert and oriented to person, place, and time.      Cranial Nerves: Cranial nerves 2-12 are intact. No cranial nerve deficit.   Psychiatric:         Attention and Perception: Attention and perception normal.         Mood and Affect: Mood and affect normal.         Behavior: Behavior normal. Behavior is cooperative.         Thought Content: Thought content normal.         Cognition and Memory: Cognition normal.         Judgment: Judgment normal.         Result Review    Result Review:  I have personally reviewed the results from the time of this admission to 8/25/2023 15:24 CDT and agree with these findings:  []  Laboratory  []  Microbiology  []  Radiology  []  EKG/Telemetry   []  Cardiology/Vascular   []  Pathology  []  Old records  []  Other:  Most notable findings include: None pertinent to ENT today    Assessment & Plan   Assessment / Plan   Brief Patient Summary:  Emili Schmitt is a 62 y.o. female who remained stable with a trach in position.  She continues to require Passy-[place] valve for increased secretions.  She has a stable trach in position.  I will downsize her to a #6 uncuffed at this point in time.  I will continue with  Passy-Dylon and capping trials.  My overall intention is to leave this trach in position for treatment of sleep apnea.  I feel she has some sleep apnea.  She can then be assessed with a sleep study and Trach.    Active Hospital Problems:   Active Hospital Problems    Diagnosis     Acute tracheostomy management     Acute on chronic respiratory failure with hypoxia and hypercapnia      Plan:   Tracheostomy management-the patient is stable trach in position.  I will continue tracheostomy management.  I have downsized her today.  I will continue Passy-[place] and capping trials.  Pulmonary hygiene-the patient has increased secretions.  She still requires trach for suction.  Sleep apnea-the patient appears to have sleep apnea clinically.  I will treat this with a trach for now.  Once she is stronger, I will discuss with her whether she wishes to be discharged with a trach for sleep apnea until she can have further evaluation.  Discussed Plan with patient, RT  Following patient as in-patient. Further recommendations will be made based on serial examinations.    Medications/Orders for this encounter: No new medications ordered.  No New orders written.     Discharge Planning: Per primary team        DVT prophylaxis:  Medical DVT prophylaxis orders are present.     Electronically signed by Jef Velázquez Jr, MD, 08/25/23, 3:24 PM CDT.

## 2023-08-26 PROCEDURE — 97530 THERAPEUTIC ACTIVITIES: CPT

## 2023-08-26 NOTE — PROGRESS NOTES
ANISH Carbajal APRN        Internal Medicine Progress Note    8/26/2023   07:21 CDT    Name:  Emili Schmitt  MRN:    [id number]     Acct:     [id number]   Room:  [address] [place] [address] Day: 0     Admit Date: 8/21/2023  6:54 PM  PCP: Remedios Qiu APRN    Subjective:     C/C: need for continued rehabilitation    Interval History: Status:  stayed the same. Resting in bed. No family at bedside. Pt currently with trach capped. Tolerating well. Wound vac with veriflo placed yesterday per wound vac team. Patient tolerated well. No complaints at this time.      Review of Systems   Constitutional: Positive for malaise/fatigue. Negative for chills, decreased appetite, weight gain and weight loss.   HENT:  Negative for congestion, ear discharge, hoarse voice and tinnitus.    Eyes:  Negative for blurred vision, discharge, visual disturbance and visual halos.   Cardiovascular:  Positive for dyspnea on exertion. Negative for chest pain, claudication, irregular heartbeat, leg swelling, orthopnea and paroxysmal nocturnal dyspnea.   Respiratory:  Positive for cough and shortness of breath. Negative for sputum production and wheezing.    Endocrine: Negative for cold intolerance, heat intolerance and polyuria.   Hematologic/Lymphatic: Negative for adenopathy. Does not bruise/bleed easily.   Skin:  Positive for poor wound healing. Negative for dry skin, itching and suspicious lesions.   Musculoskeletal:  Negative for arthritis, back pain, falls, joint pain, muscle weakness and myalgias.   Gastrointestinal:  Negative for abdominal pain, constipation, diarrhea, dysphagia and hematemesis.   Genitourinary:  Negative for bladder incontinence, dysuria and frequency.   Neurological:  Positive for weakness. Negative for aphonia, disturbances in coordination and dizziness.   Psychiatric/Behavioral:  Negative for altered mental status, depression, memory loss and substance abuse. The patient does not  have insomnia and is not nervous/anxious.        Medications:     Allergies:   Allergies   Allergen Reactions    Hydrocodone Itching     Severe itching    Tylenol [Acetaminophen] Itching       Current Meds:   Current Facility-Administered Medications:     acetylcysteine (MUCOMYST) 20 % nebulizer solution 3 mL, 3 mL, Nebulization, BID - RT, Montez Dan MD    albuterol (PROVENTIL) nebulizer solution 0.083% 2.5 mg/3mL, 2.5 mg, Nebulization, Q6H PRN, Montez Dan MD    ALPRAZolam (XANAX) tablet 0.25 mg, 0.25 mg, Oral, BID PRN, Marilee Botello APRN    amiodarone (PACERONE) tablet 200 mg, 200 mg, Oral, Q24H, Montez Dan MD    apixaban (ELIQUIS) tablet 5 mg, 5 mg, Oral, Q12H, Montez Dan MD    bisacodyl (DULCOLAX) suppository 10 mg, 10 mg, Rectal, Daily PRN, Montez Dan MD    busPIRone (BUSPAR) tablet 5 mg, 5 mg, Oral, TID With Meals, Montez Dan MD    DULoxetine (CYMBALTA) DR capsule 60 mg, 60 mg, Oral, Daily, Montez Dan MD    gabapentin (NEURONTIN) capsule 200 mg, 200 mg, Oral, TID, Montze Dan MD    hydrALAZINE (APRESOLINE) injection 5 mg, 5 mg, Intravenous, Q4H PRN, Montez Dan MD    ipratropium-albuterol (DUO-NEB) nebulizer solution 3 mL, 3 mL, Nebulization, 4x Daily - RT, Montez Dan MD    lidocaine (LIDODERM) 5 % 2 patch, 2 patch, Transdermal, Q24H, Montez Dan MD    metoprolol succinate XL (TOPROL-XL) 24 hr tablet 50 mg, 50 mg, Oral, Q24H, Montez Dan MD    [START ON 8/29/2023] Morphine sulfate (PF) injection 2 mg, 2 mg, Intravenous, Once per day on Tue Fri, Montez Dan MD    OLANZapine (zyPREXA) tablet 5 mg, 5 mg, Oral, BID, Montez Dan MD    ondansetron (ZOFRAN) tablet 4 mg, 4 mg, Oral, Q6H PRN **OR** ondansetron (ZOFRAN) injection 4 mg, 4 mg, Intravenous, Q6H PRN, Montez Dan MD    oxyCODONE (ROXICODONE) immediate release tablet 5 mg, 5 mg,  "Oral, Q6H PRN, Montez Dan MD    pantoprazole (PROTONIX) EC tablet 40 mg, 40 mg, Oral, BID, Montez Dan MD    polyethylene glycol (MIRALAX) packet 17 g, 17 g, Oral, BID, Montez Dan MD    sennosides-docusate (PERICOLACE) 8.6-50 MG per tablet 2 tablet, 2 tablet, Oral, BID, Montez Dan MD    simethicone (MYLICON) chewable tablet 80 mg, 80 mg, Oral, 4x Daily, Montez Dan MD    sodium hypochlorite (HYSEPT) 0.25 % topical solution, , Topical, Q12H, Montez Dan MD    traZODone (DESYREL) tablet 150 mg, 150 mg, Oral, Nightly, Montez Dan MD    vitamin D3 tablet 5,000 Units, 5,000 Units, Oral, Daily, Montez Dan MD    Data:     Code Status:    There are no questions and answers to display.       Family History   Problem Relation Age of Onset    Breast cancer Neg Hx     Ovarian cancer Neg Hx     Uterine cancer Neg Hx     Colon cancer Neg Hx     Melanoma Neg Hx        Social History     Socioeconomic History    Marital status:    Tobacco Use    Smoking status: Every Day     Packs/day: 1.00     Types: Cigarettes    Smokeless tobacco: Never   Substance and Sexual Activity    Alcohol use: Never    Drug use: Never    Sexual activity: Yes     Partners: Male     Birth control/protection: Surgical       Vitals:  Ht 172.7 cm (68\")   Wt 88.4 kg (194 lb 14.4 oz)   BMI 29.63 kg/m²   T 97.7 P 65 R 20 /69 Sp02 93% (room air)            I/O (24Hr):  No intake or output data in the 24 hours ending 08/26/23 0721    Labs and imaging:      No results found for this or any previous visit (from the past 12 hour(s)).                              Physical Examination:        Physical Exam  Vitals and nursing note reviewed.   Constitutional:       Appearance: Normal appearance.   HENT:      Head: Normocephalic and atraumatic.      Right Ear: External ear normal.      Left Ear: External ear normal.      Nose: Nose normal.      Mouth/Throat:      " Mouth: Mucous membranes are moist.      Pharynx: Oropharynx is clear.   Eyes:      Extraocular Movements: Extraocular movements intact.      Conjunctiva/sclera: Conjunctivae normal.      Pupils: Pupils are equal, round, and reactive to light.   Neck:      Comments: Trach to  PMV  Cardiovascular:      Rate and Rhythm: Normal rate and regular rhythm.      Pulses: Normal pulses.      Heart sounds: Normal heart sounds.   Pulmonary:      Effort: Pulmonary effort is normal.      Breath sounds: Normal breath sounds.   Abdominal:      General: Bowel sounds are normal.      Palpations: Abdomen is soft.   Musculoskeletal:      Comments: Generalized weakness   Skin:     Comments: Dressing c.d.i   Neurological:      Mental Status: She is alert and oriented to person, place, and time.   Psychiatric:         Mood and Affect: Mood normal.         Behavior: Behavior normal.         Assessment:             Acute tracheostomy management    Acute on chronic respiratory failure with hypoxia and hypercapnia    Past Medical History:   Diagnosis Date    Arthritis     Interstitial cystitis     Macular dystrophy     Neuropathy     Restless leg syndrome         Plan:        Acute hypoxic respiratory failure s/p tracheostomy tube  PAF  Acute RUE DVT  Chronic anticoagulation  Multifactorial anemia  Peripheral neuropathy  DM2 with hyperglycemia not on long term insulin  HTN  Anxiety  GERD  Constipation  Unstageable decubitus ulcer to coccyx  Continue current treatment. Monitor counts. Increase activity. Labs Monday. Oxygen weaning as tolerated. Trach management per ENT. Wound care per wound care team. Aggressive therapies as tolerated. Maintain patient safety.  Continue pain management efforts.       Electronically signed by BHAVESH Nunn on 8/26/2023 at 07:21 CDT     I have discussed the care of Emili Schmitt, including pertinent history and exam findings, with the nurse practitioner.    I have seen and examined the patient and  the key elements of all parts of the encounter have been performed by me.  I agree with the assessment, plan and orders as documented by BHAVESH Velázquez, after I modified the exam findings and the plan of treatments and the final version is my approved version of the assessment.        Electronically signed by Montez Dan MD on 8/26/2023 at 21:32 CDT

## 2023-08-27 PROCEDURE — 97530 THERAPEUTIC ACTIVITIES: CPT

## 2023-08-27 NOTE — PROGRESS NOTES
ANISH Carbajal APRN        Internal Medicine Progress Note    8/27/2023   07:35 CDT    Name:  Emili Schmitt  MRN:    [id number]     Acct:     [id number]   Room:  [address] [place] [address] Day: 0     Admit Date: 8/21/2023  6:54 PM  PCP: Remedios Qiu APRN    Subjective:     C/C: need for continued rehabilitation    Interval History: Status:  stayed the same. Resting in bed. No family at bedside. Patient tolerating trach capped and O2 per nasal canula. Pain controlled at present. Wound vac remains intact. Pt denies needs at present.      Review of Systems   Constitutional: Positive for malaise/fatigue. Negative for chills, decreased appetite, weight gain and weight loss.   HENT:  Negative for congestion, ear discharge, hoarse voice and tinnitus.    Eyes:  Negative for blurred vision, discharge, visual disturbance and visual halos.   Cardiovascular:  Positive for dyspnea on exertion. Negative for chest pain, claudication, irregular heartbeat, leg swelling, orthopnea and paroxysmal nocturnal dyspnea.   Respiratory:  Positive for cough and shortness of breath. Negative for sputum production and wheezing.    Endocrine: Negative for cold intolerance, heat intolerance and polyuria.   Hematologic/Lymphatic: Negative for adenopathy. Does not bruise/bleed easily.   Skin:  Positive for poor wound healing. Negative for dry skin, itching and suspicious lesions.   Musculoskeletal:  Negative for arthritis, back pain, falls, joint pain, muscle weakness and myalgias.   Gastrointestinal:  Negative for abdominal pain, constipation, diarrhea, dysphagia and hematemesis.   Genitourinary:  Negative for bladder incontinence, dysuria and frequency.   Neurological:  Positive for weakness. Negative for aphonia, disturbances in coordination and dizziness.   Psychiatric/Behavioral:  Negative for altered mental status, depression, memory loss and substance abuse. The patient does not have insomnia and is  not nervous/anxious.        Medications:     Allergies:   Allergies   Allergen Reactions    Hydrocodone Itching     Severe itching    Tylenol [Acetaminophen] Itching       Current Meds:   Current Facility-Administered Medications:     acetylcysteine (MUCOMYST) 20 % nebulizer solution 3 mL, 3 mL, Nebulization, BID - RT, Montez Dan MD    albuterol (PROVENTIL) nebulizer solution 0.083% 2.5 mg/3mL, 2.5 mg, Nebulization, Q6H PRN, Montez Dan MD    ALPRAZolam (XANAX) tablet 0.25 mg, 0.25 mg, Oral, BID PRN, Marilee Botello APRN    amiodarone (PACERONE) tablet 200 mg, 200 mg, Oral, Q24H, Montez Dan MD    apixaban (ELIQUIS) tablet 5 mg, 5 mg, Oral, Q12H, Montez Dan MD    bisacodyl (DULCOLAX) suppository 10 mg, 10 mg, Rectal, Daily PRN, Montez Dan MD    busPIRone (BUSPAR) tablet 5 mg, 5 mg, Oral, TID With Meals, Montez Dan MD    DULoxetine (CYMBALTA) DR capsule 60 mg, 60 mg, Oral, Daily, Montez Dan MD    gabapentin (NEURONTIN) capsule 200 mg, 200 mg, Oral, TID, Montez Dan MD    hydrALAZINE (APRESOLINE) injection 5 mg, 5 mg, Intravenous, Q4H PRN, Montez Dan MD    ipratropium-albuterol (DUO-NEB) nebulizer solution 3 mL, 3 mL, Nebulization, 4x Daily - RT, Montez Dan MD    lidocaine (LIDODERM) 5 % 2 patch, 2 patch, Transdermal, Q24H, Montez Dan MD    metoprolol succinate XL (TOPROL-XL) 24 hr tablet 50 mg, 50 mg, Oral, Q24H, Montez Dan MD    [START ON 8/29/2023] Morphine sulfate (PF) injection 2 mg, 2 mg, Intravenous, Once per day on Tue Fri, Montez Dan MD    OLANZapine (zyPREXA) tablet 5 mg, 5 mg, Oral, BID, Montez Dan MD    ondansetron (ZOFRAN) tablet 4 mg, 4 mg, Oral, Q6H PRN **OR** ondansetron (ZOFRAN) injection 4 mg, 4 mg, Intravenous, Q6H PRN, Montez Dan MD    oxyCODONE (ROXICODONE) immediate release tablet 5 mg, 5 mg, Oral, Q6H PRN, Sy  "Montez Gonzalez MD    pantoprazole (PROTONIX) EC tablet 40 mg, 40 mg, Oral, BID, Montez Dan MD    polyethylene glycol (MIRALAX) packet 17 g, 17 g, Oral, BID, Montez Dan MD    sennosides-docusate (PERICOLACE) 8.6-50 MG per tablet 2 tablet, 2 tablet, Oral, BID, Montez Dan MD    simethicone (MYLICON) chewable tablet 80 mg, 80 mg, Oral, 4x Daily, Montez Dan MD    sodium hypochlorite (HYSEPT) 0.25 % topical solution, , Topical, Q12H, Montez Dan MD    traZODone (DESYREL) tablet 150 mg, 150 mg, Oral, Nightly, Montez Dan MD    vitamin D3 tablet 5,000 Units, 5,000 Units, Oral, Daily, Montez Dan MD    Data:     Code Status:    There are no questions and answers to display.       Family History   Problem Relation Age of Onset    Breast cancer Neg Hx     Ovarian cancer Neg Hx     Uterine cancer Neg Hx     Colon cancer Neg Hx     Melanoma Neg Hx        Social History     Socioeconomic History    Marital status:    Tobacco Use    Smoking status: Every Day     Packs/day: 1.00     Types: Cigarettes    Smokeless tobacco: Never   Substance and Sexual Activity    Alcohol use: Never    Drug use: Never    Sexual activity: Yes     Partners: Male     Birth control/protection: Surgical       Vitals:  Ht 172.7 cm (68\")   Wt 88.4 kg (194 lb 14.4 oz)   BMI 29.63 kg/m²   T 97.7 P 65 R 20 /69 Sp02 93% (room air)            I/O (24Hr):  No intake or output data in the 24 hours ending 08/27/23 0735    Labs and imaging:      No results found for this or any previous visit (from the past 12 hour(s)).                              Physical Examination:        Physical Exam  Vitals and nursing note reviewed.   Constitutional:       Appearance: Normal appearance.   HENT:      Head: Normocephalic and atraumatic.      Right Ear: External ear normal.      Left Ear: External ear normal.      Nose: Nose normal.      Mouth/Throat:      Mouth: Mucous membranes " are moist.      Pharynx: Oropharynx is clear.   Eyes:      Extraocular Movements: Extraocular movements intact.      Conjunctiva/sclera: Conjunctivae normal.      Pupils: Pupils are equal, round, and reactive to light.   Neck:      Comments: Trach to  PMV  Cardiovascular:      Rate and Rhythm: Normal rate and regular rhythm.      Pulses: Normal pulses.      Heart sounds: Normal heart sounds.   Pulmonary:      Effort: Pulmonary effort is normal.      Breath sounds: Normal breath sounds.   Abdominal:      General: Bowel sounds are normal.      Palpations: Abdomen is soft.   Musculoskeletal:      Comments: Generalized weakness   Skin:     Comments: Dressing c.d.i   Neurological:      Mental Status: She is alert and oriented to person, place, and time.   Psychiatric:         Mood and Affect: Mood normal.         Behavior: Behavior normal.         Assessment:             Acute tracheostomy management    Acute on chronic respiratory failure with hypoxia and hypercapnia    Past Medical History:   Diagnosis Date    Arthritis     Interstitial cystitis     Macular dystrophy     Neuropathy     Restless leg syndrome         Plan:        Acute hypoxic respiratory failure s/p tracheostomy tube  PAF  Acute RUE DVT  Chronic anticoagulation  Multifactorial anemia  Peripheral neuropathy  DM2 with hyperglycemia not on long term insulin  HTN  Anxiety  GERD  Constipation  Unstageable decubitus ulcer to coccyx  Continue current treatment. Monitor counts. Increase activity. Labs Monday. Oxygen weaning as tolerated. Trach management per ENT. Wound care per wound care team. Aggressive therapies as tolerated. Maintain patient safety.  Continue pain management efforts.       Electronically signed by BHAVESH Nunn on 8/27/2023 at 07:35 CDT

## 2023-08-28 LAB
ANION GAP SERPL CALCULATED.3IONS-SCNC: 10 MMOL/L (ref 5–15)
BASOPHILS # BLD AUTO: 0.06 10*3/MM3 (ref 0–0.2)
BASOPHILS NFR BLD AUTO: 0.7 % (ref 0–1.5)
BUN SERPL-MCNC: 8 MG/DL (ref 8–23)
BUN/CREAT SERPL: 33.3 (ref 7–25)
CALCIUM SPEC-SCNC: 9.7 MG/DL (ref 8.6–10.5)
CHLORIDE SERPL-SCNC: 103 MMOL/L (ref 98–107)
CO2 SERPL-SCNC: 26 MMOL/L (ref 22–29)
CREAT SERPL-MCNC: 0.24 MG/DL (ref 0.57–1)
CRP SERPL-MCNC: 3.06 MG/DL (ref 0–0.5)
DEPRECATED RDW RBC AUTO: 59.7 FL (ref 37–54)
EGFRCR SERPLBLD CKD-EPI 2021: 127.5 ML/MIN/1.73
EOSINOPHIL # BLD AUTO: 0.16 10*3/MM3 (ref 0–0.4)
EOSINOPHIL NFR BLD AUTO: 1.8 % (ref 0.3–6.2)
ERYTHROCYTE [DISTWIDTH] IN BLOOD BY AUTOMATED COUNT: 16 % (ref 12.3–15.4)
ERYTHROCYTE [SEDIMENTATION RATE] IN BLOOD: 65 MM/HR (ref 0–30)
GLUCOSE SERPL-MCNC: 134 MG/DL (ref 65–99)
HCT VFR BLD AUTO: 32.7 % (ref 34–46.6)
HGB BLD-MCNC: 9.5 G/DL (ref 12–15.9)
IMM GRANULOCYTES # BLD AUTO: 0.09 10*3/MM3 (ref 0–0.05)
IMM GRANULOCYTES NFR BLD AUTO: 1 % (ref 0–0.5)
LYMPHOCYTES # BLD AUTO: 1.72 10*3/MM3 (ref 0.7–3.1)
LYMPHOCYTES NFR BLD AUTO: 19.2 % (ref 19.6–45.3)
MCH RBC QN AUTO: 29.4 PG (ref 26.6–33)
MCHC RBC AUTO-ENTMCNC: 29.1 G/DL (ref 31.5–35.7)
MCV RBC AUTO: 101.2 FL (ref 79–97)
MONOCYTES # BLD AUTO: 0.61 10*3/MM3 (ref 0.1–0.9)
MONOCYTES NFR BLD AUTO: 6.8 % (ref 5–12)
NEUTROPHILS NFR BLD AUTO: 6.34 10*3/MM3 (ref 1.7–7)
NEUTROPHILS NFR BLD AUTO: 70.5 % (ref 42.7–76)
NRBC BLD AUTO-RTO: 0 /100 WBC (ref 0–0.2)
PLATELET # BLD AUTO: 377 10*3/MM3 (ref 140–450)
PMV BLD AUTO: 9.8 FL (ref 6–12)
POTASSIUM SERPL-SCNC: 3.9 MMOL/L (ref 3.5–5.2)
RBC # BLD AUTO: 3.23 10*6/MM3 (ref 3.77–5.28)
SODIUM SERPL-SCNC: 139 MMOL/L (ref 136–145)
WBC NRBC COR # BLD: 8.98 10*3/MM3 (ref 3.4–10.8)

## 2023-08-28 PROCEDURE — 97535 SELF CARE MNGMENT TRAINING: CPT

## 2023-08-28 PROCEDURE — 80048 BASIC METABOLIC PNL TOTAL CA: CPT | Performed by: INTERNAL MEDICINE

## 2023-08-28 PROCEDURE — 85025 COMPLETE CBC W/AUTO DIFF WBC: CPT | Performed by: INTERNAL MEDICINE

## 2023-08-28 PROCEDURE — 85652 RBC SED RATE AUTOMATED: CPT | Performed by: INTERNAL MEDICINE

## 2023-08-28 PROCEDURE — 97530 THERAPEUTIC ACTIVITIES: CPT

## 2023-08-28 PROCEDURE — 86140 C-REACTIVE PROTEIN: CPT | Performed by: INTERNAL MEDICINE

## 2023-08-28 PROCEDURE — 99232 SBSQ HOSP IP/OBS MODERATE 35: CPT | Performed by: OTOLARYNGOLOGY

## 2023-08-28 PROCEDURE — 97110 THERAPEUTIC EXERCISES: CPT

## 2023-08-28 NOTE — PROGRESS NOTES
[facility] Otolaryngology Head and Neck Surgery  INPATIENT PROGRESS NOTE    Patient Name: Emili Schmitt  : 1960   MRN: [id number]  Date of Admission: 2023  Today's Date: 23  Length of Stay: 0  [unfilled]   Montez Dan MD   Primary Care Physician: Remedios Qiu APRN  Surgical Procedures Since Admission:    Subjective   Subjective   Chief Complaint: Tracheostomy management  HPI   Accompanied by: RT  Since last examined, Emili Schmitt has remained stable over the weekend.  She is tolerating capping.  She is able to vocalize well.  She states that she has requested suctioning to some degree.  She is eating well.  She is trying to mobilize.  RT reports patient still has some mucous plugging but otherwise does well and can request suction as needed.  Patient is seen, chart is reviewed    Review of Systems   No change from prior inquiry  All pertinent negatives and positives are as above. All other systems have been reviewed and are negative unless otherwise stated.   Objective   Objective   Vitals:        Physical Exam  Vitals reviewed.   Constitutional:       Appearance: Normal appearance. She is well-developed. She is obese.      Interventions: She is intubated.      Comments: Lying in bed, trach in position, trach collar, trach capped.  Appears much improved over yesterday.   HENT:      Head: Normocephalic and atraumatic.      Right Ear: Hearing and external ear normal.      Left Ear: Hearing and external ear normal.      Nose: Nose normal.      Mouth/Throat:      Lips: Pink.      Mouth: Mucous membranes are dry.      Dentition: Normal dentition. No gum lesions.      Tongue: No lesions. Tongue does not deviate from midline.      Palate: No mass and lesions.      Pharynx: Oropharynx is clear. Uvula midline.   Eyes:      General: Lids are normal. Gaze aligned appropriately.      Extraocular Movements: Extraocular movements intact.       Conjunctiva/sclera: Conjunctivae normal.   Neck:      Trachea: Tracheostomy present.      Comments: my tube type: Shiley #6 uncuffed DIC, flexible shaft    Stoma: Healing appropriately    Secretions: Minimal  Trach capped    Voice: Stronger  Date placed: 8/8/2023  Date last changed: 8/25/2023    Pulmonary:      Effort: Pulmonary effort is normal. No tachypnea, respiratory distress or retractions. She is intubated.      Breath sounds: No stridor.      Comments: Trach in position, trach collar, capped  Musculoskeletal:      Cervical back: No rigidity or crepitus. Decreased range of motion.   Lymphadenopathy:      Cervical: No cervical adenopathy.   Skin:     Findings: No rash.   Neurological:      General: No focal deficit present.      Mental Status: She is alert and oriented to person, place, and time.      Cranial Nerves: Cranial nerves 2-12 are intact. No cranial nerve deficit.   Psychiatric:         Attention and Perception: Attention and perception normal.         Mood and Affect: Mood and affect normal.         Behavior: Behavior normal. Behavior is cooperative.         Thought Content: Thought content normal.         Cognition and Memory: Cognition normal.         Judgment: Judgment normal.         Result Review    Result Review:  I have personally reviewed the results from the time of this admission to 8/28/2023 08:47 CDT and agree with these findings:  []  Laboratory  []  Microbiology  []  Radiology  []  EKG/Telemetry   []  Cardiology/Vascular   []  Pathology  []  Old records  []  Other:  Most notable findings include: None pertinent to ENT this morning    Assessment & Plan   Assessment / Plan   Brief Patient Summary:  Emili Schmitt is a 62 y.o. female who remained stable with a trach in position.  She is currently capped.  She continues with some mucous plugging.  She is able to request suction.  She is able to clear some of the secretions.  Continue current tracheostomy management for suction and  pulmonary hygiene.  She wishes the trach weaned out prior to her discharge.  I feel this is a reasonable suggestion.  I am concerned that she has some degree of sleep apnea.  She does not wish to maintain the trach to sleep apnea.    Active Hospital Problems:   Active Hospital Problems    Diagnosis     Acute tracheostomy management     Acute on chronic respiratory failure with hypoxia and hypercapnia      Plan:   Tracheostomy management-patient is stable trach in position.  She is capped.  She still requires suction.  I will continue current tracheostomy management.  Mucous plugging-the patient has some degree of mucous plugging.  She will continue the trach for suction and pulmonary hygiene.  Sleep apnea-the patient does not wish trach for sleep apnea therapy.  I will plan to wean the trach and no normal course of her stay in LTAC.  Discussed Plan with patient, RT  Following patient as in-patient. Further recommendations will be made based on serial examinations.    Medications/Orders for this encounter: No new medications ordered.  No New orders written.     Discharge Planning: Per primary team        DVT prophylaxis:  Medical DVT prophylaxis orders are present.     Electronically signed by Jef Velázquez Jr, MD, 08/28/23, 8:47 AM CDT.

## 2023-08-28 NOTE — PROGRESS NOTES
ANISH Carbajal APRN        Internal Medicine Progress Note    8/28/2023   11:01 CDT    Name:  Emili Schmitt  MRN:    [id number]     Acct:     [id number]   Room:  [address] [place] [address] Day: 0     Admit Date: 8/21/2023  6:54 PM  PCP: Remedios Qiu APRN    Subjective:     C/C: need for continued rehabilitation    Interval History: Status:  stayed the same. Resting in bed.  at bedside. Afebrile. Maintaining adequate 02 sats with 02 at 2 lpm. Tolerating trach with cap. Anxious for decannulation. Still with increased pain to wound. Pain tolerable at present time in current position. Wanting to move around more. AM labs pending.     Review of Systems   Constitutional: Positive for malaise/fatigue. Negative for chills, decreased appetite, weight gain and weight loss.   HENT:  Negative for congestion, ear discharge, hoarse voice and tinnitus.    Eyes:  Negative for blurred vision, discharge, visual disturbance and visual halos.   Cardiovascular:  Positive for dyspnea on exertion. Negative for chest pain, claudication, irregular heartbeat, leg swelling, orthopnea and paroxysmal nocturnal dyspnea.   Respiratory:  Positive for cough and shortness of breath. Negative for sputum production and wheezing.    Endocrine: Negative for cold intolerance, heat intolerance and polyuria.   Hematologic/Lymphatic: Negative for adenopathy. Does not bruise/bleed easily.   Skin:  Positive for poor wound healing. Negative for dry skin, itching and suspicious lesions.   Musculoskeletal:  Negative for arthritis, back pain, falls, joint pain, muscle weakness and myalgias.   Gastrointestinal:  Negative for abdominal pain, constipation, diarrhea, dysphagia and hematemesis.   Genitourinary:  Negative for bladder incontinence, dysuria and frequency.   Neurological:  Positive for weakness. Negative for aphonia, disturbances in coordination and dizziness.   Psychiatric/Behavioral:  Negative for altered  mental status, depression, memory loss and substance abuse. The patient does not have insomnia and is not nervous/anxious.        Medications:     Allergies:   Allergies   Allergen Reactions    Hydrocodone Itching     Severe itching    Tylenol [Acetaminophen] Itching       Current Meds:   Current Facility-Administered Medications:     acetylcysteine (MUCOMYST) 20 % nebulizer solution 3 mL, 3 mL, Nebulization, BID - RT, Montez Dan MD    albuterol (PROVENTIL) nebulizer solution 0.083% 2.5 mg/3mL, 2.5 mg, Nebulization, Q6H PRN, Montez Dan MD    ALPRAZolam (XANAX) tablet 0.25 mg, 0.25 mg, Oral, BID PRN, Marilee Botello APRN    amiodarone (PACERONE) tablet 200 mg, 200 mg, Oral, Q24H, Montez Dan MD    apixaban (ELIQUIS) tablet 5 mg, 5 mg, Oral, Q12H, Montez Dan MD    bisacodyl (DULCOLAX) suppository 10 mg, 10 mg, Rectal, Daily PRN, Montez Dan MD    busPIRone (BUSPAR) tablet 5 mg, 5 mg, Oral, TID With Meals, Montez Dan MD    DULoxetine (CYMBALTA) DR capsule 60 mg, 60 mg, Oral, Daily, Montez Dan MD    gabapentin (NEURONTIN) capsule 200 mg, 200 mg, Oral, TID, Montez Dan MD    hydrALAZINE (APRESOLINE) injection 5 mg, 5 mg, Intravenous, Q4H PRN, Montez Dan MD    ipratropium-albuterol (DUO-NEB) nebulizer solution 3 mL, 3 mL, Nebulization, 4x Daily - RT, Montez Dan MD    lidocaine (LIDODERM) 5 % 2 patch, 2 patch, Transdermal, Q24H, Montez Dan MD    metoprolol succinate XL (TOPROL-XL) 24 hr tablet 50 mg, 50 mg, Oral, Q24H, Montez Dan MD    [START ON 8/29/2023] Morphine sulfate (PF) injection 2 mg, 2 mg, Intravenous, Once per day on Tue Fri, Montez Dan MD    OLANZapine (zyPREXA) tablet 5 mg, 5 mg, Oral, BID, Montez Dan MD    ondansetron (ZOFRAN) tablet 4 mg, 4 mg, Oral, Q6H PRN **OR** ondansetron (ZOFRAN) injection 4 mg, 4 mg, Intravenous, Q6H PRN, Sy  "Montez Gonzalez MD    oxyCODONE (ROXICODONE) immediate release tablet 5 mg, 5 mg, Oral, Q6H PRN, Montez Dan MD    pantoprazole (PROTONIX) EC tablet 40 mg, 40 mg, Oral, BID, Montez Dan MD    polyethylene glycol (MIRALAX) packet 17 g, 17 g, Oral, BID, Montez Dan MD    sennosides-docusate (PERICOLACE) 8.6-50 MG per tablet 2 tablet, 2 tablet, Oral, BID, Montez Dan MD    simethicone (MYLICON) chewable tablet 80 mg, 80 mg, Oral, 4x Daily, Montez Dan MD    sodium hypochlorite (HYSEPT) 0.25 % topical solution, , Topical, Q12H, Montez Dan MD    traZODone (DESYREL) tablet 150 mg, 150 mg, Oral, Nightly, Montez Dan MD    vitamin D3 tablet 5,000 Units, 5,000 Units, Oral, Daily, Montez Dan MD    Data:     Code Status:    There are no questions and answers to display.       Family History   Problem Relation Age of Onset    Breast cancer Neg Hx     Ovarian cancer Neg Hx     Uterine cancer Neg Hx     Colon cancer Neg Hx     Melanoma Neg Hx        Social History     Socioeconomic History    Marital status:    Tobacco Use    Smoking status: Every Day     Packs/day: 1.00     Types: Cigarettes    Smokeless tobacco: Never   Substance and Sexual Activity    Alcohol use: Never    Drug use: Never    Sexual activity: Yes     Partners: Male     Birth control/protection: Surgical       Vitals:  Ht 172.7 cm (68\")   Wt 87.4 kg (192 lb 11.2 oz)   BMI 29.30 kg/m²   T 97.7 P 65 R 20 /69 Sp02 93% (room air)            I/O (24Hr):  No intake or output data in the 24 hours ending 08/28/23 1101    Labs and imaging:      No results found for this or any previous visit (from the past 12 hour(s)).              Physical Examination:        Physical Exam  Vitals and nursing note reviewed.   Constitutional:       Appearance: Normal appearance.   HENT:      Head: Normocephalic and atraumatic.      Right Ear: External ear normal.      Left Ear: " External ear normal.      Nose: Nose normal.      Mouth/Throat:      Mouth: Mucous membranes are moist.      Pharynx: Oropharynx is clear.   Eyes:      Extraocular Movements: Extraocular movements intact.      Conjunctiva/sclera: Conjunctivae normal.      Pupils: Pupils are equal, round, and reactive to light.   Neck:      Comments: Trach capped  Cardiovascular:      Rate and Rhythm: Normal rate and regular rhythm.      Pulses: Normal pulses.      Heart sounds: Normal heart sounds.   Pulmonary:      Effort: Pulmonary effort is normal.      Breath sounds: Normal breath sounds.   Abdominal:      General: Bowel sounds are normal.      Palpations: Abdomen is soft.   Musculoskeletal:      Comments: Generalized weakness   Skin:     Comments: Dressing c.d.i   Neurological:      Mental Status: She is alert and oriented to person, place, and time.   Psychiatric:         Mood and Affect: Mood normal.         Behavior: Behavior normal.         Assessment:             Acute tracheostomy management    Acute on chronic respiratory failure with hypoxia and hypercapnia    Past Medical History:   Diagnosis Date    Arthritis     Interstitial cystitis     Macular dystrophy     Neuropathy     Restless leg syndrome         Plan:        Acute hypoxic respiratory failure s/p tracheostomy tube  PAF  Acute RUE DVT  Chronic anticoagulation  Multifactorial anemia  Peripheral neuropathy  DM2 with hyperglycemia not on long term insulin  HTN  Anxiety  GERD  Constipation  Unstageable decubitus ulcer to coccyx  Continue current treatment. Monitor counts. Increase activity. Labs Thursday. Oxygen weaning as tolerated. Trach management per ENT. Wound care per wound care team. Aggressive therapies as tolerated. Maintain patient safety.  Continue pain management efforts.       Electronically signed by BHAVESH Mills on 8/28/2023 at 11:01 CDT     I have discussed the care of Emili Schmitt, including pertinent history and exam findings,  with the nurse practitioner.    I have seen and examined the patient and the key elements of all parts of the encounter have been performed by me.  I agree with the assessment, plan and orders as documented by BHAVESH Sheehan, after I modified the exam findings and the plan of treatments and the final version is my approved version of the assessment.        Electronically signed by Montez Dan MD on 8/28/2023 at 13:09 CDT

## 2023-08-29 PROCEDURE — 25010000002 MORPHINE PER 10 MG: Performed by: INTERNAL MEDICINE

## 2023-08-29 PROCEDURE — 97530 THERAPEUTIC ACTIVITIES: CPT

## 2023-08-29 RX ORDER — OXYCODONE HYDROCHLORIDE 5 MG/1
5 TABLET ORAL EVERY 6 HOURS PRN
Status: DISCONTINUED | OUTPATIENT
Start: 2023-08-29 | End: 2023-09-03

## 2023-08-29 RX ORDER — DIPHENHYDRAMINE HYDROCHLORIDE AND LIDOCAINE HYDROCHLORIDE AND ALUMINUM HYDROXIDE AND MAGNESIUM HYDRO
5 KIT 3 TIMES DAILY PRN
Status: DISCONTINUED | OUTPATIENT
Start: 2023-08-29 | End: 2023-09-13 | Stop reason: HOSPADM

## 2023-08-29 NOTE — PROGRESS NOTES
Adult Nutrition  Assessment/PES    Patient Name:  Emili Schmitt  YOB: 1960  MRN: [id number]  Admit Date:  8/21/2023    Assessment Date:  8/29/2023       Reason for Assessment       Row Name 08/29/23 1454          Reason for Assessment    Reason For Assessment follow-up protocol     Diagnosis infection/sepsis;pulmonary disease;cardiac disease     Identified At Risk by Screening Criteria difficulty chewing/swallowing                    Nutrition/Diet History       Row Name 08/29/23 1454          Nutrition/Diet History    Typical Intake (Food/Fluid/EN/PN) Pt reported good appetite and drinking ONS as ordered. Requires assist with meals. Wound vac continues to coccyx. Trach capped. Glu ranges 103-230.                    Labs/Tests/Procedures/Meds       Row Name 08/29/23 1454          Labs/Procedures/Meds    Lab Results Reviewed reviewed     Lab Results Comments glu 103-230        Diagnostic Tests/Procedures    Diagnostic Test/Procedure Reviewed reviewed        Medications    Pertinent Medications Reviewed reviewed     Pertinent Medications Comments See MAR                    Physical Findings       Row Name 08/29/23 1454          Physical Findings    Overall Physical Appearance Trach capped, BM 8/28, no edema, coccyx wound with wound vac, bilateral heels reddened.                    Estimated/Assessed Needs - Anthropometrics       Row Name 08/29/23 1453          Anthropometrics    Weight for Calculation 87.4 kg (192 lb 11.2 oz)        Estimated/Assessed Needs    Additional Documentation Fluid Requirements (Group);[place]-St. Jeor Equation (Group);Protein Requirements (Group)        [place]-St. Jeor Equation    RMR ([place]-St. Jeor Equation) 1487.58     [place]-St. Jeor Activity Factors 1.2     Activity Factors ([place]-St. Jeor) 1785.096        Protein Requirements    Weight Used For Protein Calculations 63.5 kg (140 lb)  IBW     Est Protein Requirement Amount (gms/kg) 1.4 gm protein      Estimated Protein Requirements (gms/day) 88.91        Fluid Requirements    Fluid Requirements (mL/day) 1785     RDA Method (mL) 1785                    Nutrition Prescription Ordered       Row Name 08/29/23 1455          Nutrition Prescription PO    Current PO Diet Soft Texture     Texture Chopped     Fluid Consistency Thin     Supplement Boost Glucose Control (Glucerna Shake)     Supplement Frequency 3 times a day                    Evaluation of Received Nutrient/Fluid Intake       Row Name 08/29/23 1455          Nutrient/Fluid Evaluation    Number of Days Evaluated 3 days        Fluid Intake Evaluation    Oral Fluid (mL) 1930        PO Evaluation    Number of Days PO Intake Evaluated 3 days     Number of Meals 7     % PO Intake 68                       Problem/Interventions:   Problem 1       Row Name 08/29/23 1456          Nutrition Diagnoses Problem 1    Problem 1 Increased Nutrient Needs     Inadequate Intake Type Oral     Macronutrient Kcal     Micronutrient Vitamin;Mineral     Etiology (related to) Medical Diagnosis     Skin Pressure injury;Skin breakdown;Non healing wound     Signs/Symptoms (evidenced by) PO Intake;SLP/Swallow eval  initiating; day one PO 8/22/2023     Percent (%) intake recorded 68 %     Over number of meals 7     Swallow eval status Done     Type of SLP Evaluation Bedside                          Intervention Goal       Row Name 08/29/23 1456          Intervention Goal    General Reduce/improve symptoms;Meet nutritional needs for age/condition;Disease management/therapy     PO Tolerate PO;Continue positive trend;Meet estimated needs     Weight No significant weight loss                    Nutrition Intervention       Row Name 08/29/23 1456          Nutrition Intervention    RD/Tech Action Care plan reviewd;Follow Tx progress;Encourage intake;Supplement provided;Menu provided;Interview for preference                    Nutrition Prescription       Row Name 08/29/23 1456          Nutrition  Prescription PO    PO Prescription Other (comment)  continue same protocol                    Education/Evaluation       Row Name 08/29/23 1456          Education    Education No discharge needs identified at this time        Monitor/Evaluation    Monitor Per protocol                     Electronically signed by:  Inés Bonilla RDN, LD  08/29/23 14:56 CDT

## 2023-08-29 NOTE — PROGRESS NOTES
ANISH Carbajal APRN        Internal Medicine Progress Note    8/29/2023   11:56 CDT    Name:  Emili Schmitt  MRN:    [id number]     Acct:     [id number]   Room:  [address] [place] [address] Day: 0     Admit Date: 8/21/2023  6:54 PM  PCP: Remedios Qiu APRN    Subjective:     C/C: need for continued rehabilitation    Interval History: Status:  stayed the same. Resting in bed. Daughter at bedside. Afebrile. Maintaining adequate 02 sats with 02 at 2 lpm. Tolerating trach with cap. Anxious for decannulation. Tolerated wound care and wound vac change this morning. Appears comfortable at present. Reports she stood with therapy today.     Review of Systems   Constitutional: Positive for malaise/fatigue. Negative for chills, decreased appetite, weight gain and weight loss.   HENT:  Negative for congestion, ear discharge, hoarse voice and tinnitus.    Eyes:  Negative for blurred vision, discharge, visual disturbance and visual halos.   Cardiovascular:  Positive for dyspnea on exertion. Negative for chest pain, claudication, irregular heartbeat, leg swelling, orthopnea and paroxysmal nocturnal dyspnea.   Respiratory:  Positive for cough and shortness of breath. Negative for sputum production and wheezing.    Endocrine: Negative for cold intolerance, heat intolerance and polyuria.   Hematologic/Lymphatic: Negative for adenopathy. Does not bruise/bleed easily.   Skin:  Positive for poor wound healing. Negative for dry skin, itching and suspicious lesions.   Musculoskeletal:  Negative for arthritis, back pain, falls, joint pain, muscle weakness and myalgias.   Gastrointestinal:  Negative for abdominal pain, constipation, diarrhea, dysphagia and hematemesis.   Genitourinary:  Negative for bladder incontinence, dysuria and frequency.   Neurological:  Positive for weakness. Negative for aphonia, disturbances in coordination and dizziness.   Psychiatric/Behavioral:  Negative for altered mental  status, depression, memory loss and substance abuse. The patient does not have insomnia and is not nervous/anxious.        Medications:     Allergies:   Allergies   Allergen Reactions    Hydrocodone Itching     Severe itching    Tylenol [Acetaminophen] Itching       Current Meds:   Current Facility-Administered Medications:     acetylcysteine (MUCOMYST) 20 % nebulizer solution 3 mL, 3 mL, Nebulization, BID - RT, Montez Dan MD    albuterol (PROVENTIL) nebulizer solution 0.083% 2.5 mg/3mL, 2.5 mg, Nebulization, Q6H PRN, Montez Dan MD    ALPRAZolam (XANAX) tablet 0.25 mg, 0.25 mg, Oral, BID PRN, Marilee Botello APRN    amiodarone (PACERONE) tablet 200 mg, 200 mg, Oral, Q24H, Montez Dan MD    apixaban (ELIQUIS) tablet 5 mg, 5 mg, Oral, Q12H, Montez Dan MD    bisacodyl (DULCOLAX) suppository 10 mg, 10 mg, Rectal, Daily PRN, Montez Dan MD    busPIRone (BUSPAR) tablet 5 mg, 5 mg, Oral, TID With Meals, Montez Dan MD    DULoxetine (CYMBALTA) DR capsule 60 mg, 60 mg, Oral, Daily, Montez Dan MD    gabapentin (NEURONTIN) capsule 200 mg, 200 mg, Oral, TID, Montez Dan MD    hydrALAZINE (APRESOLINE) injection 5 mg, 5 mg, Intravenous, Q4H PRN, Montez Dan MD    ipratropium-albuterol (DUO-NEB) nebulizer solution 3 mL, 3 mL, Nebulization, 4x Daily - RT, Montez Dan MD    lidocaine (LIDODERM) 5 % 2 patch, 2 patch, Transdermal, Q24H, Montez Dan MD    metoprolol succinate XL (TOPROL-XL) 24 hr tablet 50 mg, 50 mg, Oral, Q24H, Montez Dan MD    Morphine sulfate (PF) injection 2 mg, 2 mg, Intravenous, Once per day on Tue Fri, Montez Dan MD    OLANZapine (zyPREXA) tablet 5 mg, 5 mg, Oral, BID, Montez Dan MD    ondansetron (ZOFRAN) tablet 4 mg, 4 mg, Oral, Q6H PRN **OR** ondansetron (ZOFRAN) injection 4 mg, 4 mg, Intravenous, Q6H PRN, Montez Dan MD    oxyCODONE  "(ROXICODONE) immediate release tablet 5 mg, 5 mg, Oral, Q6H PRN, Montez Dan MD    pantoprazole (PROTONIX) EC tablet 40 mg, 40 mg, Oral, BID, Montez Dan MD    polyethylene glycol (MIRALAX) packet 17 g, 17 g, Oral, BID, Montez Dan MD    sennosides-docusate (PERICOLACE) 8.6-50 MG per tablet 2 tablet, 2 tablet, Oral, BID, Montez Dan MD    simethicone (MYLICON) chewable tablet 80 mg, 80 mg, Oral, 4x Daily, Montez Dan MD    sodium hypochlorite (HYSEPT) 0.25 % topical solution, , Topical, Q12H, Montez Dan MD    traZODone (DESYREL) tablet 150 mg, 150 mg, Oral, Nightly, Montez Dan MD    vitamin D3 tablet 5,000 Units, 5,000 Units, Oral, Daily, Mnotez Dan MD    Data:     Code Status:    There are no questions and answers to display.       Family History   Problem Relation Age of Onset    Breast cancer Neg Hx     Ovarian cancer Neg Hx     Uterine cancer Neg Hx     Colon cancer Neg Hx     Melanoma Neg Hx        Social History     Socioeconomic History    Marital status:    Tobacco Use    Smoking status: Every Day     Packs/day: 1.00     Types: Cigarettes    Smokeless tobacco: Never   Substance and Sexual Activity    Alcohol use: Never    Drug use: Never    Sexual activity: Yes     Partners: Male     Birth control/protection: Surgical       Vitals:  Ht 172.7 cm (68\")   Wt 87.4 kg (192 lb 11.2 oz)   BMI 29.30 kg/m²   T 98.2 P 66 R 19 /110 Sp02 98% (2 lpm)            I/O (24Hr):  No intake or output data in the 24 hours ending 08/29/23 1156    Labs and imaging:      No results found for this or any previous visit (from the past 12 hour(s)).              Physical Examination:        Physical Exam  Vitals and nursing note reviewed.   Constitutional:       Appearance: Normal appearance.   HENT:      Head: Normocephalic and atraumatic.      Right Ear: External ear normal.      Left Ear: External ear normal.      Nose: Nose " normal.      Mouth/Throat:      Mouth: Mucous membranes are moist.      Pharynx: Oropharynx is clear.   Eyes:      Extraocular Movements: Extraocular movements intact.      Conjunctiva/sclera: Conjunctivae normal.      Pupils: Pupils are equal, round, and reactive to light.   Neck:      Comments: Trach capped  Cardiovascular:      Rate and Rhythm: Normal rate and regular rhythm.      Pulses: Normal pulses.      Heart sounds: Normal heart sounds.   Pulmonary:      Effort: Pulmonary effort is normal.      Breath sounds: Normal breath sounds.   Abdominal:      General: Bowel sounds are normal.      Palpations: Abdomen is soft.   Musculoskeletal:      Comments: Generalized weakness   Skin:     Comments: Wound vac intact   Neurological:      Mental Status: She is alert and oriented to person, place, and time.   Psychiatric:         Mood and Affect: Mood normal.         Behavior: Behavior normal.         Assessment:             Acute tracheostomy management    Acute on chronic respiratory failure with hypoxia and hypercapnia    Past Medical History:   Diagnosis Date    Arthritis     Interstitial cystitis     Macular dystrophy     Neuropathy     Restless leg syndrome         Plan:        Acute hypoxic respiratory failure s/p tracheostomy tube  PAF  Acute RUE DVT  Chronic anticoagulation  Multifactorial anemia  Peripheral neuropathy  DM2 with hyperglycemia not on long term insulin  HTN  Anxiety  GERD  Constipation  Unstageable decubitus ulcer to coccyx  Continue current treatment. Monitor counts. Increase activity. Labs Thursday. Oxygen weaning as tolerated. Trach management per ENT. Wound care per wound care team. Aggressive therapies as tolerated. Maintain patient safety.  Continue pain management efforts.       Electronically signed by BHAVESH Mills on 8/29/2023 at 11:56 CDT     I have discussed the care of Emili Schmitt, including pertinent history and exam findings, with the nurse practitioner.    I  have seen and examined the patient and the key elements of all parts of the encounter have been performed by me.  I agree with the assessment, plan and orders as documented by BHAVESH Sheehan, after I modified the exam findings and the plan of treatments and the final version is my approved version of the assessment.        Electronically signed by Montez Dan MD on 8/29/2023 at 19:53 CDT

## 2023-08-30 PROCEDURE — 99232 SBSQ HOSP IP/OBS MODERATE 35: CPT | Performed by: OTOLARYNGOLOGY

## 2023-08-30 PROCEDURE — 97535 SELF CARE MNGMENT TRAINING: CPT | Performed by: OCCUPATIONAL THERAPIST

## 2023-08-30 PROCEDURE — 97530 THERAPEUTIC ACTIVITIES: CPT

## 2023-08-30 RX ORDER — ECHINACEA PURPUREA EXTRACT 125 MG
2 TABLET ORAL AS NEEDED
Status: DISCONTINUED | OUTPATIENT
Start: 2023-08-30 | End: 2023-09-13 | Stop reason: HOSPADM

## 2023-08-30 RX ORDER — ALPRAZOLAM 0.25 MG/1
0.25 TABLET ORAL 2 TIMES DAILY PRN
Status: DISCONTINUED | OUTPATIENT
Start: 2023-08-30 | End: 2023-09-05

## 2023-08-30 NOTE — PROGRESS NOTES
[facility] Otolaryngology Head and Neck Surgery  INPATIENT PROGRESS NOTE    Patient Name: Emili Schmitt  : 1960   MRN: [id number]  Date of Admission: 2023  Today's Date: 23  Length of Stay: 0  [unfilled]   Montez Dan MD   Primary Care Physician: Remedios Qiu APRN  Surgical Procedures Since Admission:    Subjective   Subjective   Chief Complaint: Tracheostomy management  HPI   Accompanied by: No one  Since last examined, Emili Schmitt has remained stable to trach in position.  She denies any breathing issues.  She is swallowing well.  She is vocalizing well.  She is beginning to stand at the bedside.  RT reports no issues with the tracheostomy.  Patient is seen, chart is reviewed    Review of Systems   No change from prior inquiry  All pertinent negatives and positives are as above. All other systems have been reviewed and are negative unless otherwise stated.   Objective   Objective   Vitals:        Physical Exam  Vitals reviewed.   Constitutional:       Appearance: Normal appearance. She is well-developed. She is obese.      Interventions: She is intubated.      Comments: Lying in bed, trach in position, trach collar, trach capped.  Appears much improved over yesterday.   HENT:      Head: Normocephalic and atraumatic.      Comments: Lower face tremors     Right Ear: Hearing and external ear normal.      Left Ear: Hearing and external ear normal.      Nose: Nose normal.      Mouth/Throat:      Lips: Pink.      Mouth: Mucous membranes are dry.      Dentition: Normal dentition. No gum lesions.      Tongue: No lesions. Tongue does not deviate from midline.      Palate: No mass and lesions.      Pharynx: Oropharynx is clear. Uvula midline.   Eyes:      General: Lids are normal. Gaze aligned appropriately.      Extraocular Movements: Extraocular movements intact.      Conjunctiva/sclera: Conjunctivae normal.   Neck:      Trachea: Tracheostomy  present.      Comments: my tube type: Shiley #6 uncuffed DIC, flexible shaft    Stoma: Healing appropriately    Secretions: Minimal  Trach capped    Voice: Stronger  Date placed: 8/8/2023  Date last changed: 8/25/2023    Pulmonary:      Effort: Pulmonary effort is normal. No tachypnea, respiratory distress or retractions. She is intubated.      Breath sounds: No stridor.      Comments: Trach in position, trach collar, capped  Musculoskeletal:      Cervical back: No rigidity or crepitus. Decreased range of motion.   Lymphadenopathy:      Cervical: No cervical adenopathy.   Skin:     Findings: No rash.   Neurological:      General: No focal deficit present.      Mental Status: She is alert and oriented to person, place, and time.      Cranial Nerves: Cranial nerves 2-12 are intact. No cranial nerve deficit.      Comments: Entire body with tremors   Psychiatric:         Attention and Perception: Attention and perception normal.         Mood and Affect: Mood and affect normal.         Behavior: Behavior normal. Behavior is cooperative.         Thought Content: Thought content normal.         Cognition and Memory: Cognition normal.         Judgment: Judgment normal.         Result Review    Result Review:  I have personally reviewed the results from the time of this admission to 8/30/2023 11:39 CDT and agree with these findings:  []  Laboratory  []  Microbiology  []  Radiology  []  EKG/Telemetry   []  Cardiology/Vascular   []  Pathology  []  Old records  []  Other:  Most notable findings include: No labs pertinent to ENT today    Assessment & Plan   Assessment / Plan   Brief Patient Summary:  Emili Schmitt is a 62 y.o. female who remained stable to trach in position.  She has remained capped.  I will continue a Trach at this point in time.  She wishes her trach weaned prior to discharge.  I plan to wean her early next week if the patient has no issues with inpatient rehabilitation.  I will continue current  tracheostomy management.    Active Hospital Problems:   Active Hospital Problems    Diagnosis     Acute tracheostomy management     Acute on chronic respiratory failure with hypoxia and hypercapnia      Plan:   Tracheostomy management-the patient is stable trach in position.  I will continue tracheostomy management.  Sleep apnea-this is a suspected diagnosis.  The patient does not wish a trach to treat this at this point in time.  Discussed Plan with patient, RT  Following patient as in-patient. Further recommendations will be made based on serial examinations.    Medications/Orders for this encounter: No new medications ordered.  No New orders written.     Discharge Planning: Per primary team        DVT prophylaxis:  Medical DVT prophylaxis orders are present.     Electronically signed by Jef Velázquez Jr, MD, 08/30/23, 11:39 AM CDT.

## 2023-08-30 NOTE — PROGRESS NOTES
[facility]        Internal Medicine Progress Note    8/30/2023   14:15 CDT    Name:  Emili Schmitt  MRN:    [id number]     Acct:     [id number]   Room:  [address] [place] [address] Day: 0     Admit Date: 8/21/2023  6:54 PM  PCP: Remedios Qiu APRN    Subjective:     C/C: need for continued rehabilitation    Interval History: Status:  stayed the same. Resting in bed. No family at bedside. Afebrile. Maintaining adequate 02 sats with 02 at 3 lpm. Tolerating trach with cap. Anxious for decannulation. Reports some \"whistling\" from wound vac. Appears comfortable at present.     Review of Systems   Constitutional: Positive for malaise/fatigue. Negative for chills, decreased appetite, weight gain and weight loss.   HENT:  Negative for congestion, ear discharge, hoarse voice and tinnitus.    Eyes:  Negative for blurred vision, discharge, visual disturbance and visual halos.   Cardiovascular:  Positive for dyspnea on exertion. Negative for chest pain, claudication, irregular heartbeat, leg swelling, orthopnea and paroxysmal nocturnal dyspnea.   Respiratory:  Positive for cough and shortness of breath. Negative for sputum production and wheezing.    Endocrine: Negative for cold intolerance, heat intolerance and polyuria.   Hematologic/Lymphatic: Negative for adenopathy. Does not bruise/bleed easily.   Skin:  Positive for poor wound healing. Negative for dry skin, itching and suspicious lesions.   Musculoskeletal:  Negative for arthritis, back pain, falls, joint pain, muscle weakness and myalgias.   Gastrointestinal:  Negative for abdominal pain, constipation, diarrhea, dysphagia and hematemesis.   Genitourinary:  Negative for bladder incontinence, dysuria and frequency.   Neurological:  Positive for weakness. Negative for aphonia, disturbances in coordination and dizziness.   Psychiatric/Behavioral:  Negative for altered mental status, depression, memory loss and substance " abuse. The patient does not have insomnia and is not nervous/anxious.        Medications:     Allergies:   Allergies   Allergen Reactions    Hydrocodone Itching     Severe itching    Tylenol [Acetaminophen] Itching       Current Meds:   Current Facility-Administered Medications:     acetylcysteine (MUCOMYST) 20 % nebulizer solution 3 mL, 3 mL, Nebulization, BID - RT, Montez Dan MD    albuterol (PROVENTIL) nebulizer solution 0.083% 2.5 mg/3mL, 2.5 mg, Nebulization, Q6H PRN, Montez Dan MD    ALPRAZolam (XANAX) tablet 0.25 mg, 0.25 mg, Oral, BID PRN, Montez Dan MD    amiodarone (PACERONE) tablet 200 mg, 200 mg, Oral, Q24H, Montez Dan MD    apixaban (ELIQUIS) tablet 5 mg, 5 mg, Oral, Q12H, Montez Dan MD    bisacodyl (DULCOLAX) suppository 10 mg, 10 mg, Rectal, Daily PRN, Montez Dan MD    busPIRone (BUSPAR) tablet 5 mg, 5 mg, Oral, TID With Meals, Montez Dan MD    DULoxetine (CYMBALTA) DR capsule 60 mg, 60 mg, Oral, Daily, Montez Dan MD    First Mouthwash (Magic Mouthwash) 5 mL, 5 mL, Swish & Spit, TID PRN, Valencia Arias APRN    gabapentin (NEURONTIN) capsule 200 mg, 200 mg, Oral, TID, Montez Dan MD    hydrALAZINE (APRESOLINE) injection 5 mg, 5 mg, Intravenous, Q4H PRN, Montez Dan MD    ipratropium-albuterol (DUO-NEB) nebulizer solution 3 mL, 3 mL, Nebulization, 4x Daily - RT, Montez Dan MD    lidocaine (LIDODERM) 5 % 2 patch, 2 patch, Transdermal, Q24H, Montez Dan MD    metoprolol succinate XL (TOPROL-XL) 24 hr tablet 50 mg, 50 mg, Oral, Q24H, Montez Dan MD    Morphine sulfate (PF) injection 2 mg, 2 mg, Intravenous, Once per day on Tue Fri, Montez Dan MD    OLANZapine (zyPREXA) tablet 5 mg, 5 mg, Oral, BID, Montez Dan MD    ondansetron (ZOFRAN) tablet 4 mg, 4 mg, Oral, Q6H PRN **OR** ondansetron (ZOFRAN) injection 4 mg, 4 mg, Intravenous,  "Q6H PRN, Montez Dan MD    oxyCODONE (ROXICODONE) immediate release tablet 5 mg, 5 mg, Oral, Q6H PRN, Montez Dan MD    pantoprazole (PROTONIX) EC tablet 40 mg, 40 mg, Oral, BID, Montez Dan MD    polyethylene glycol (MIRALAX) packet 17 g, 17 g, Oral, BID, Montez Dan MD    sennosides-docusate (PERICOLACE) 8.6-50 MG per tablet 2 tablet, 2 tablet, Oral, BID, Montez Dan MD    simethicone (MYLICON) chewable tablet 80 mg, 80 mg, Oral, 4x Daily, Montez Dan MD    sodium chloride nasal spray 2 spray, 2 spray, Each Nare, PRN, Marilee Botello APRN    sodium hypochlorite (HYSEPT) 0.25 % topical solution, , Topical, Q12H, Montez Dan MD    traZODone (DESYREL) tablet 150 mg, 150 mg, Oral, Nightly, Montez Dan MD    vitamin D3 tablet 5,000 Units, 5,000 Units, Oral, Daily, Montez Dan MD    Data:     Code Status:    There are no questions and answers to display.       Family History   Problem Relation Age of Onset    Breast cancer Neg Hx     Ovarian cancer Neg Hx     Uterine cancer Neg Hx     Colon cancer Neg Hx     Melanoma Neg Hx        Social History     Socioeconomic History    Marital status:    Tobacco Use    Smoking status: Every Day     Packs/day: 1.00     Types: Cigarettes    Smokeless tobacco: Never   Substance and Sexual Activity    Alcohol use: Never    Drug use: Never    Sexual activity: Yes     Partners: Male     Birth control/protection: Surgical       Vitals:  Ht 172.7 cm (68\")   Wt 87.4 kg (192 lb 11.2 oz)   BMI 29.30 kg/m²   T 98.2 P 66 R 19 /110 Sp02 98% (2 lpm)            I/O (24Hr):  No intake or output data in the 24 hours ending 08/30/23 1415    Labs and imaging:      No results found for this or any previous visit (from the past 12 hour(s)).              Physical Examination:        Physical Exam  Vitals and nursing note reviewed.   Constitutional:       Appearance: Normal appearance. "   HENT:      Head: Normocephalic and atraumatic.      Right Ear: External ear normal.      Left Ear: External ear normal.      Nose: Nose normal.      Mouth/Throat:      Mouth: Mucous membranes are moist.      Pharynx: Oropharynx is clear.   Eyes:      Extraocular Movements: Extraocular movements intact.      Conjunctiva/sclera: Conjunctivae normal.      Pupils: Pupils are equal, round, and reactive to light.   Neck:      Comments: Trach capped  Cardiovascular:      Rate and Rhythm: Normal rate and regular rhythm.      Pulses: Normal pulses.      Heart sounds: Normal heart sounds.   Pulmonary:      Effort: Pulmonary effort is normal.      Breath sounds: Normal breath sounds.   Abdominal:      General: Bowel sounds are normal.      Palpations: Abdomen is soft.   Musculoskeletal:      Comments: Generalized weakness   Skin:     Comments: Wound vac intact   Neurological:      Mental Status: She is alert and oriented to person, place, and time.   Psychiatric:         Mood and Affect: Mood normal.         Behavior: Behavior normal.         Assessment:             Acute tracheostomy management    Acute on chronic respiratory failure with hypoxia and hypercapnia    Past Medical History:   Diagnosis Date    Arthritis     Interstitial cystitis     Macular dystrophy     Neuropathy     Restless leg syndrome         Plan:        Acute hypoxic respiratory failure s/p tracheostomy tube  PAF  Acute RUE DVT  Chronic anticoagulation  Multifactorial anemia  Peripheral neuropathy  DM2 with hyperglycemia not on long term insulin  HTN  Anxiety  GERD  Constipation  Unstageable decubitus ulcer to coccyx  Continue current treatment. Monitor counts. Increase activity. Labs in am. Oxygen weaning as tolerated. Trach management per ENT. Wound care per wound care team. Aggressive therapies as tolerated. Maintain patient safety.  Continue pain management efforts.       Electronically signed by BHAVESH Mills on 8/30/2023 at 14:15 CDT

## 2023-08-31 LAB
ANION GAP SERPL CALCULATED.3IONS-SCNC: 7 MMOL/L (ref 5–15)
BASOPHILS # BLD AUTO: 0.04 10*3/MM3 (ref 0–0.2)
BASOPHILS NFR BLD AUTO: 0.4 % (ref 0–1.5)
BUN SERPL-MCNC: 7 MG/DL (ref 8–23)
BUN/CREAT SERPL: 25.9 (ref 7–25)
CALCIUM SPEC-SCNC: 9.9 MG/DL (ref 8.6–10.5)
CHLORIDE SERPL-SCNC: 103 MMOL/L (ref 98–107)
CO2 SERPL-SCNC: 30 MMOL/L (ref 22–29)
CREAT SERPL-MCNC: 0.27 MG/DL (ref 0.57–1)
CRP SERPL-MCNC: 4.47 MG/DL (ref 0–0.5)
DEPRECATED RDW RBC AUTO: 54.4 FL (ref 37–54)
EGFRCR SERPLBLD CKD-EPI 2021: 124 ML/MIN/1.73
EOSINOPHIL # BLD AUTO: 0.15 10*3/MM3 (ref 0–0.4)
EOSINOPHIL NFR BLD AUTO: 1.6 % (ref 0.3–6.2)
ERYTHROCYTE [DISTWIDTH] IN BLOOD BY AUTOMATED COUNT: 15.5 % (ref 12.3–15.4)
ERYTHROCYTE [SEDIMENTATION RATE] IN BLOOD: 60 MM/HR (ref 0–30)
GLUCOSE SERPL-MCNC: 135 MG/DL (ref 65–99)
HCT VFR BLD AUTO: 31.3 % (ref 34–46.6)
HGB BLD-MCNC: 9.2 G/DL (ref 12–15.9)
IMM GRANULOCYTES # BLD AUTO: 0.05 10*3/MM3 (ref 0–0.05)
IMM GRANULOCYTES NFR BLD AUTO: 0.5 % (ref 0–0.5)
LYMPHOCYTES # BLD AUTO: 1.53 10*3/MM3 (ref 0.7–3.1)
LYMPHOCYTES NFR BLD AUTO: 16.6 % (ref 19.6–45.3)
MCH RBC QN AUTO: 28.5 PG (ref 26.6–33)
MCHC RBC AUTO-ENTMCNC: 29.4 G/DL (ref 31.5–35.7)
MCV RBC AUTO: 96.9 FL (ref 79–97)
MONOCYTES # BLD AUTO: 0.59 10*3/MM3 (ref 0.1–0.9)
MONOCYTES NFR BLD AUTO: 6.4 % (ref 5–12)
NEUTROPHILS NFR BLD AUTO: 6.84 10*3/MM3 (ref 1.7–7)
NEUTROPHILS NFR BLD AUTO: 74.5 % (ref 42.7–76)
NRBC BLD AUTO-RTO: 0 /100 WBC (ref 0–0.2)
PLATELET # BLD AUTO: 381 10*3/MM3 (ref 140–450)
PMV BLD AUTO: 9.8 FL (ref 6–12)
POTASSIUM SERPL-SCNC: 3.5 MMOL/L (ref 3.5–5.2)
RBC # BLD AUTO: 3.23 10*6/MM3 (ref 3.77–5.28)
SODIUM SERPL-SCNC: 140 MMOL/L (ref 136–145)
WBC NRBC COR # BLD: 9.2 10*3/MM3 (ref 3.4–10.8)

## 2023-08-31 PROCEDURE — 85652 RBC SED RATE AUTOMATED: CPT | Performed by: INTERNAL MEDICINE

## 2023-08-31 PROCEDURE — 80048 BASIC METABOLIC PNL TOTAL CA: CPT | Performed by: INTERNAL MEDICINE

## 2023-08-31 PROCEDURE — 97535 SELF CARE MNGMENT TRAINING: CPT

## 2023-08-31 PROCEDURE — 97110 THERAPEUTIC EXERCISES: CPT

## 2023-08-31 PROCEDURE — 86140 C-REACTIVE PROTEIN: CPT | Performed by: INTERNAL MEDICINE

## 2023-08-31 PROCEDURE — 97530 THERAPEUTIC ACTIVITIES: CPT

## 2023-08-31 PROCEDURE — 85025 COMPLETE CBC W/AUTO DIFF WBC: CPT | Performed by: INTERNAL MEDICINE

## 2023-08-31 RX ORDER — OLANZAPINE 5 MG/1
5 TABLET ORAL NIGHTLY
Status: DISCONTINUED | OUTPATIENT
Start: 2023-08-31 | End: 2023-09-06

## 2023-08-31 RX ORDER — POTASSIUM CHLORIDE 750 MG/1
40 CAPSULE, EXTENDED RELEASE ORAL EVERY 6 HOURS SCHEDULED
Status: DISPENSED | OUTPATIENT
Start: 2023-08-31 | End: 2023-08-31

## 2023-08-31 NOTE — PROGRESS NOTES
ANISH Carbajal APRN        Internal Medicine Progress Note    8/31/2023   13:19 CDT    Name:  Emili Schmitt  MRN:    [id number]     Acct:     [id number]   Room:  [address] [place] [address] Day: 0     Admit Date: 8/21/2023  6:54 PM  PCP: Remedios Qiu APRN    Subjective:     C/C: need for continued rehabilitation    Interval History: Status:  stayed the same. Resting in bed. No family at bedside. Afebrile. Maintaining adequate 02 sats with 02 at 2 lpm. Tolerating trach with cap. Anxious for decannulation. Still having difficulty maintaining seal on wound vac. Appears comfortable at present. Counts stable.     Review of Systems   Constitutional: Positive for malaise/fatigue. Negative for chills, decreased appetite, weight gain and weight loss.   HENT:  Negative for congestion, ear discharge, hoarse voice and tinnitus.    Eyes:  Negative for blurred vision, discharge, visual disturbance and visual halos.   Cardiovascular:  Positive for dyspnea on exertion. Negative for chest pain, claudication, irregular heartbeat, leg swelling, orthopnea and paroxysmal nocturnal dyspnea.   Respiratory:  Positive for cough and shortness of breath. Negative for sputum production and wheezing.    Endocrine: Negative for cold intolerance, heat intolerance and polyuria.   Hematologic/Lymphatic: Negative for adenopathy. Does not bruise/bleed easily.   Skin:  Positive for poor wound healing. Negative for dry skin, itching and suspicious lesions.   Musculoskeletal:  Negative for arthritis, back pain, falls, joint pain, muscle weakness and myalgias.   Gastrointestinal:  Negative for abdominal pain, constipation, diarrhea, dysphagia and hematemesis.   Genitourinary:  Negative for bladder incontinence, dysuria and frequency.   Neurological:  Positive for weakness. Negative for aphonia, disturbances in coordination and dizziness.   Psychiatric/Behavioral:  Negative for altered mental status, depression,  memory loss and substance abuse. The patient does not have insomnia and is not nervous/anxious.        Medications:     Allergies:   Allergies   Allergen Reactions    Hydrocodone Itching     Severe itching    Tylenol [Acetaminophen] Itching       Current Meds:   Current Facility-Administered Medications:     albuterol (PROVENTIL) nebulizer solution 0.083% 2.5 mg/3mL, 2.5 mg, Nebulization, Q6H PRN, Montez Dan MD    ALPRAZolam (XANAX) tablet 0.25 mg, 0.25 mg, Oral, BID PRN, Montez Dan MD    amiodarone (PACERONE) tablet 200 mg, 200 mg, Oral, Q24H, Montez Dan MD    apixaban (ELIQUIS) tablet 5 mg, 5 mg, Oral, Q12H, Montez Dan MD    bisacodyl (DULCOLAX) suppository 10 mg, 10 mg, Rectal, Daily PRN, Montez Dan MD    busPIRone (BUSPAR) tablet 5 mg, 5 mg, Oral, TID With Meals, Montez Dan MD    DULoxetine (CYMBALTA) DR capsule 60 mg, 60 mg, Oral, Daily, Montez Dan MD    First Mouthwash (Magic Mouthwash) 5 mL, 5 mL, Swish & Spit, TID PRN, Valencia Arias APRN    gabapentin (NEURONTIN) capsule 200 mg, 200 mg, Oral, TID, Montez Dan MD    hydrALAZINE (APRESOLINE) injection 5 mg, 5 mg, Intravenous, Q4H PRN, Montez Dan MD    ipratropium-albuterol (DUO-NEB) nebulizer solution 3 mL, 3 mL, Nebulization, 4x Daily - RT, Montez Dan MD    lidocaine (LIDODERM) 5 % 2 patch, 2 patch, Transdermal, Q24H, Montez Dan MD    metoprolol succinate XL (TOPROL-XL) 24 hr tablet 50 mg, 50 mg, Oral, Q24H, Montez Dan MD    Morphine sulfate (PF) injection 2 mg, 2 mg, Intravenous, Once per day on Tue Fri, Montez Dan MD    OLANZapine (zyPREXA) tablet 5 mg, 5 mg, Oral, BID, Montez Dan MD    ondansetron (ZOFRAN) tablet 4 mg, 4 mg, Oral, Q6H PRN **OR** ondansetron (ZOFRAN) injection 4 mg, 4 mg, Intravenous, Q6H PRN, Montez Dan MD    oxyCODONE (ROXICODONE) immediate release tablet 5  "mg, 5 mg, Oral, Q6H PRN, Montez Dan MD    pantoprazole (PROTONIX) EC tablet 40 mg, 40 mg, Oral, BID, Montez Dan MD    polyethylene glycol (MIRALAX) packet 17 g, 17 g, Oral, BID, Montez Dan MD    sennosides-docusate (PERICOLACE) 8.6-50 MG per tablet 2 tablet, 2 tablet, Oral, BID, Montez Dan MD    simethicone (MYLICON) chewable tablet 80 mg, 80 mg, Oral, 4x Daily, Montez Dan MD    sodium chloride nasal spray 2 spray, 2 spray, Each Nare, PRN, Marilee Botello, BHAVESH    sodium hypochlorite (HYSEPT) 0.25 % topical solution, , Topical, Q12H, Montez Dan MD    traZODone (DESYREL) tablet 150 mg, 150 mg, Oral, Nightly, Montez Dan MD    vitamin D3 tablet 5,000 Units, 5,000 Units, Oral, Daily, Montez Dan MD    Data:     Code Status:    There are no questions and answers to display.       Family History   Problem Relation Age of Onset    Breast cancer Neg Hx     Ovarian cancer Neg Hx     Uterine cancer Neg Hx     Colon cancer Neg Hx     Melanoma Neg Hx        Social History     Socioeconomic History    Marital status:    Tobacco Use    Smoking status: Every Day     Packs/day: 1.00     Types: Cigarettes    Smokeless tobacco: Never   Substance and Sexual Activity    Alcohol use: Never    Drug use: Never    Sexual activity: Yes     Partners: Male     Birth control/protection: Surgical       Vitals:  Ht 172.7 cm (68\")   Wt 87.4 kg (192 lb 11.2 oz)   BMI 29.30 kg/m²   T 98.1 P 61 R 25 /76 Sp02 98% (2 lpm)            I/O (24Hr):  No intake or output data in the 24 hours ending 08/31/23 1319    Labs and imaging:      Recent Results (from the past 12 hour(s))   Basic Metabolic Panel    Collection Time: 08/31/23  5:35 AM    Specimen: Blood   Result Value Ref Range    Glucose 135 (H) 65 - 99 mg/dL    BUN 7 (L) 8 - 23 mg/dL    Creatinine 0.27 (L) 0.57 - 1.00 mg/dL    Sodium 140 136 - 145 mmol/L    Potassium 3.5 3.5 - 5.2 mmol/L "    Chloride 103 98 - 107 mmol/L    CO2 30.0 (H) 22.0 - 29.0 mmol/L    Calcium 9.9 8.6 - 10.5 mg/dL    BUN/Creatinine Ratio 25.9 (H) 7.0 - 25.0    Anion Gap 7.0 5.0 - 15.0 mmol/L    eGFR 124.0 >60.0 mL/min/1.73   Sedimentation Rate    Collection Time: 08/31/23  5:35 AM    Specimen: Blood   Result Value Ref Range    Sed Rate 60 (H) 0 - 30 mm/hr   C-reactive Protein    Collection Time: 08/31/23  5:35 AM    Specimen: Blood   Result Value Ref Range    C-Reactive Protein 4.47 (H) 0.00 - 0.50 mg/dL   CBC Auto Differential    Collection Time: 08/31/23  5:35 AM    Specimen: Blood   Result Value Ref Range    WBC 9.20 3.40 - 10.80 10*3/mm3    RBC 3.23 (L) 3.77 - 5.28 10*6/mm3    Hemoglobin 9.2 (L) 12.0 - 15.9 g/dL    Hematocrit 31.3 (L) 34.0 - 46.6 %    MCV 96.9 79.0 - 97.0 fL    MCH 28.5 26.6 - 33.0 pg    MCHC 29.4 (L) 31.5 - 35.7 g/dL    RDW 15.5 (H) 12.3 - 15.4 %    RDW-SD 54.4 (H) 37.0 - 54.0 fl    MPV 9.8 6.0 - 12.0 fL    Platelets 381 140 - 450 10*3/mm3    Neutrophil % 74.5 42.7 - 76.0 %    Lymphocyte % 16.6 (L) 19.6 - 45.3 %    Monocyte % 6.4 5.0 - 12.0 %    Eosinophil % 1.6 0.3 - 6.2 %    Basophil % 0.4 0.0 - 1.5 %    Immature Grans % 0.5 0.0 - 0.5 %    Neutrophils, Absolute 6.84 1.70 - 7.00 10*3/mm3    Lymphocytes, Absolute 1.53 0.70 - 3.10 10*3/mm3    Monocytes, Absolute 0.59 0.10 - 0.90 10*3/mm3    Eosinophils, Absolute 0.15 0.00 - 0.40 10*3/mm3    Basophils, Absolute 0.04 0.00 - 0.20 10*3/mm3    Immature Grans, Absolute 0.05 0.00 - 0.05 10*3/mm3    nRBC 0.0 0.0 - 0.2 /100 WBC                 Physical Examination:        Physical Exam  Vitals and nursing note reviewed.   Constitutional:       Appearance: Normal appearance.   HENT:      Head: Normocephalic and atraumatic.      Right Ear: External ear normal.      Left Ear: External ear normal.      Nose: Nose normal.      Mouth/Throat:      Mouth: Mucous membranes are moist.      Pharynx: Oropharynx is clear.   Eyes:      Extraocular Movements: Extraocular movements  intact.      Conjunctiva/sclera: Conjunctivae normal.      Pupils: Pupils are equal, round, and reactive to light.   Neck:      Comments: Trach capped  Cardiovascular:      Rate and Rhythm: Normal rate and regular rhythm.      Pulses: Normal pulses.      Heart sounds: Normal heart sounds.   Pulmonary:      Effort: Pulmonary effort is normal.      Breath sounds: Normal breath sounds.   Abdominal:      General: Bowel sounds are normal.      Palpations: Abdomen is soft.   Musculoskeletal:      Comments: Generalized weakness   Skin:     Comments: Wound vac intact   Neurological:      Mental Status: She is alert and oriented to person, place, and time.   Psychiatric:         Mood and Affect: Mood normal.         Behavior: Behavior normal.         Assessment:             Acute tracheostomy management    Acute on chronic respiratory failure with hypoxia and hypercapnia    Past Medical History:   Diagnosis Date    Arthritis     Interstitial cystitis     Macular dystrophy     Neuropathy     Restless leg syndrome         Plan:        Acute hypoxic respiratory failure s/p tracheostomy tube  PAF  Acute RUE DVT  Chronic anticoagulation  Multifactorial anemia  Peripheral neuropathy  DM2 with hyperglycemia not on long term insulin  HTN  Anxiety  GERD  Constipation  Unstageable decubitus ulcer to coccyx  Continue current treatment. Monitor counts. Increase activity. Labs Tuesday. Oxygen weaning as tolerated. Trach management per ENT. Wound care per wound care team. Aggressive therapies as tolerated. Maintain patient safety.  Continue pain management efforts.       Electronically signed by BHAVESH Mills on 8/31/2023 at 13:19 CDT     I have discussed the care of Emili Schmitt, including pertinent history and exam findings, with the nurse practitioner.    I have seen and examined the patient and the key elements of all parts of the encounter have been performed by me.  I agree with the assessment, plan and orders as  documented by BHAVESH Sheehan, after I modified the exam findings and the plan of treatments and the final version is my approved version of the assessment.        Electronically signed by Montez Dan MD on 9/1/2023 at 06:38 CDT

## 2023-09-01 PROCEDURE — 97535 SELF CARE MNGMENT TRAINING: CPT

## 2023-09-01 PROCEDURE — 97530 THERAPEUTIC ACTIVITIES: CPT

## 2023-09-01 PROCEDURE — 25010000002 MORPHINE PER 10 MG: Performed by: INTERNAL MEDICINE

## 2023-09-01 RX ORDER — FLUCONAZOLE 100 MG/1
150 TABLET ORAL ONCE
Status: DISCONTINUED | OUTPATIENT
Start: 2023-09-01 | End: 2023-09-03

## 2023-09-01 NOTE — PROGRESS NOTES
[facility] Fall Risk Assessment Note    Name: Emili Schmitt  MRN: [id number]  [facility]: [id number]  Admit Date/Time: 8/21/2023  6:54 PM.    Currently ordered medications associated with an increased risk for fall include:    Scheduled Meds:  amiodarone, 200 mg, Oral, Q24H  busPIRone, 5 mg, Oral, TID With Meals  DULoxetine, 60 mg, Oral, Daily  gabapentin, 200 mg, Oral, TID  metoprolol succinate XL, 50 mg, Oral, Q24H  Morphine, 2 mg, Intravenous, Once per day on Tue Fri  OLANZapine, 5 mg, Oral, Nightly  polyethylene glycol, 17 g, Oral, BID  senna-docusate sodium, 2 tablet, Oral, BID  traZODone, 150 mg, Oral, Nightly    PRN Meds:    ALPRAZolam    bisacodyl    hydrALAZINE    ondansetron     oxyCODONE    Flaco Naranjo, PharmTHOMAS  09/01/23 15:05 CDT

## 2023-09-01 NOTE — PROGRESS NOTES
ANISH Carbajal APRN        Internal Medicine Progress Note    9/1/2023   10:11 CDT    Name:  Emili Schmitt  MRN:    [id number]     Acct:     [id number]   Room:  [address] [place] [address] Day: 0     Admit Date: 8/21/2023  6:54 PM  PCP: Remedios Qiu APRN    Subjective:     C/C: need for continued rehabilitation    Interval History: Status:  stayed the same. Resting in bed.  at bedside. Afebrile. Maintaining adequate 02 sats on room air. Tolerating trach with cap. Anxious for decannulation.  Appears comfortable at present. Asking about possible transfer to [facility] to complete treatment closer to home.     Review of Systems   Constitutional: Positive for malaise/fatigue. Negative for chills, decreased appetite, weight gain and weight loss.   HENT:  Negative for congestion, ear discharge, hoarse voice and tinnitus.    Eyes:  Negative for blurred vision, discharge, visual disturbance and visual halos.   Cardiovascular:  Positive for dyspnea on exertion. Negative for chest pain, claudication, irregular heartbeat, leg swelling, orthopnea and paroxysmal nocturnal dyspnea.   Respiratory:  Positive for cough and shortness of breath. Negative for sputum production and wheezing.    Endocrine: Negative for cold intolerance, heat intolerance and polyuria.   Hematologic/Lymphatic: Negative for adenopathy. Does not bruise/bleed easily.   Skin:  Positive for poor wound healing. Negative for dry skin, itching and suspicious lesions.   Musculoskeletal:  Negative for arthritis, back pain, falls, joint pain, muscle weakness and myalgias.   Gastrointestinal:  Negative for abdominal pain, constipation, diarrhea, dysphagia and hematemesis.   Genitourinary:  Negative for bladder incontinence, dysuria and frequency.   Neurological:  Positive for weakness. Negative for aphonia, disturbances in coordination and dizziness.   Psychiatric/Behavioral:  Negative for altered mental  Mom returned phone call to office and scheduled appointment  status, depression, memory loss and substance abuse. The patient does not have insomnia and is not nervous/anxious.        Medications:     Allergies:   Allergies   Allergen Reactions    Hydrocodone Itching     Severe itching    Tylenol [Acetaminophen] Itching       Current Meds:   Current Facility-Administered Medications:     albuterol (PROVENTIL) nebulizer solution 0.083% 2.5 mg/3mL, 2.5 mg, Nebulization, Q6H PRN, Montez Dan MD    ALPRAZolam (XANAX) tablet 0.25 mg, 0.25 mg, Oral, BID PRN, Montez Dan MD    amiodarone (PACERONE) tablet 200 mg, 200 mg, Oral, Q24H, Montez Dan MD    apixaban (ELIQUIS) tablet 5 mg, 5 mg, Oral, Q12H, Montez Dan MD    bisacodyl (DULCOLAX) suppository 10 mg, 10 mg, Rectal, Daily PRN, Montez Dan MD    busPIRone (BUSPAR) tablet 5 mg, 5 mg, Oral, TID With Meals, Montez Dan MD    DULoxetine (CYMBALTA) DR capsule 60 mg, 60 mg, Oral, Daily, Montez Dan MD    First Mouthwash (Magic Mouthwash) 5 mL, 5 mL, Swish & Spit, TID PRN, Valencia Arias APRN    gabapentin (NEURONTIN) capsule 200 mg, 200 mg, Oral, TID, Montez Dan MD    hydrALAZINE (APRESOLINE) injection 5 mg, 5 mg, Intravenous, Q4H PRN, Montez Dan MD    ipratropium-albuterol (DUO-NEB) nebulizer solution 3 mL, 3 mL, Nebulization, 4x Daily - RT, Montez Dan MD    lidocaine (LIDODERM) 5 % 2 patch, 2 patch, Transdermal, Q24H, Montez Dan MD    metoprolol succinate XL (TOPROL-XL) 24 hr tablet 50 mg, 50 mg, Oral, Q24H, Montez Dan MD    Morphine sulfate (PF) injection 2 mg, 2 mg, Intravenous, Once per day on Tue Fri, Montez Dan MD    OLANZapine (zyPREXA) tablet 5 mg, 5 mg, Oral, Nightly, Montez Dan MD    ondansetron (ZOFRAN) tablet 4 mg, 4 mg, Oral, Q6H PRN **OR** ondansetron (ZOFRAN) injection 4 mg, 4 mg, Intravenous, Q6H PRN, Montez Dan MD    oxyCODONE (ROXICODONE)  "immediate release tablet 5 mg, 5 mg, Oral, Q6H PRN, Montez Dan MD    pantoprazole (PROTONIX) EC tablet 40 mg, 40 mg, Oral, BID, Montez Dan MD    polyethylene glycol (MIRALAX) packet 17 g, 17 g, Oral, BID, Montez Dan MD    sennosides-docusate (PERICOLACE) 8.6-50 MG per tablet 2 tablet, 2 tablet, Oral, BID, Montez Dan MD    simethicone (MYLICON) chewable tablet 80 mg, 80 mg, Oral, 4x Daily, Montez Dan MD    sodium chloride nasal spray 2 spray, 2 spray, Each Nare, PRN, Marilee Botello, BHAVESH    sodium hypochlorite (HYSEPT) 0.25 % topical solution, , Topical, Q12H, Montez Dan MD    traZODone (DESYREL) tablet 150 mg, 150 mg, Oral, Nightly, Montez Dan MD    vitamin D3 tablet 5,000 Units, 5,000 Units, Oral, Daily, Montez Dan MD    Data:     Code Status:    There are no questions and answers to display.       Family History   Problem Relation Age of Onset    Breast cancer Neg Hx     Ovarian cancer Neg Hx     Uterine cancer Neg Hx     Colon cancer Neg Hx     Melanoma Neg Hx        Social History     Socioeconomic History    Marital status:    Tobacco Use    Smoking status: Every Day     Packs/day: 1.00     Types: Cigarettes    Smokeless tobacco: Never   Substance and Sexual Activity    Alcohol use: Never    Drug use: Never    Sexual activity: Yes     Partners: Male     Birth control/protection: Surgical       Vitals:  Ht 172.7 cm (68\")   Wt 87.4 kg (192 lb 11.2 oz)   BMI 29.30 kg/m²   T 97.7 P 111 R 19 /63 Sp02 92% (room air)            I/O (24Hr):  No intake or output data in the 24 hours ending 09/01/23 1011    Labs and imaging:      No results found for this or any previous visit (from the past 12 hour(s)).                Physical Examination:        Physical Exam  Vitals and nursing note reviewed.   Constitutional:       Appearance: Normal appearance.   HENT:      Head: Normocephalic and atraumatic.      " Right Ear: External ear normal.      Left Ear: External ear normal.      Nose: Nose normal.      Mouth/Throat:      Mouth: Mucous membranes are moist.      Pharynx: Oropharynx is clear.   Eyes:      Extraocular Movements: Extraocular movements intact.      Conjunctiva/sclera: Conjunctivae normal.      Pupils: Pupils are equal, round, and reactive to light.   Neck:      Comments: Trach capped  Cardiovascular:      Rate and Rhythm: Normal rate and regular rhythm.      Pulses: Normal pulses.      Heart sounds: Normal heart sounds.   Pulmonary:      Effort: Pulmonary effort is normal.      Breath sounds: Normal breath sounds.   Abdominal:      General: Bowel sounds are normal.      Palpations: Abdomen is soft.   Musculoskeletal:      Comments: Generalized weakness   Skin:     Comments: Wound vac intact   Neurological:      Mental Status: She is alert and oriented to person, place, and time.   Psychiatric:         Mood and Affect: Mood normal.         Behavior: Behavior normal.         Assessment:             Acute tracheostomy management    Acute on chronic respiratory failure with hypoxia and hypercapnia    Past Medical History:   Diagnosis Date    Arthritis     Interstitial cystitis     Macular dystrophy     Neuropathy     Restless leg syndrome         Plan:        Acute hypoxic respiratory failure s/p tracheostomy tube  PAF  Acute RUE DVT  Chronic anticoagulation  Multifactorial anemia  Peripheral neuropathy  DM2 with hyperglycemia not on long term insulin  HTN  Anxiety  GERD  Constipation  Unstageable decubitus ulcer to coccyx  Continue current treatment. Monitor counts. Increase activity. Labs Tuesday. Oxygen weaning as tolerated. Trach management per ENT. Wound care per wound care team. Aggressive therapies as tolerated. Maintain patient safety.  Continue pain management efforts.       Electronically signed by BHAVESH Mills on 9/1/2023 at 10:11 CDT   I have discussed the care of Emili Schmitt  including pertinent history and exam findings, with the nurse practitioner.    I have seen and examined the patient and the key elements of all parts of the encounter have been performed by me.  I agree with the assessment, plan and orders as documented by BHAVESH Sheehan, after I modified the exam findings and the plan of treatments and the final version is my approved version of the assessment.        Electronically signed by Montez Dan MD on 9/1/2023 at 19:16 CDT

## 2023-09-02 PROCEDURE — 97110 THERAPEUTIC EXERCISES: CPT

## 2023-09-02 PROCEDURE — 25010000002 HYDROMORPHONE 1 MG/ML SOLUTION: Performed by: NURSE PRACTITIONER

## 2023-09-02 PROCEDURE — 25010000002 ONDANSETRON PER 1 MG: Performed by: INTERNAL MEDICINE

## 2023-09-02 NOTE — PROGRESS NOTES
ANISH Carbajal APRN        Internal Medicine Progress Note    9/2/2023   08:27 CDT    Name:  Emili Schmitt  MRN:    [id number]     Acct:     [id number]   Room:  [address] [place] [address] Day: 0     Admit Date: 8/21/2023  6:54 PM  PCP: Remedios Qiu APRN    Subjective:     C/C: need for continued rehabilitation    Interval History: Status:  stayed the same. Resting in bed. No family at bedside. Wound vac intact. Pt complains of nausea at present, RN at bedside with PRN Zofran.  Trach capped, patient tolerating well.      Review of Systems   Constitutional: Positive for malaise/fatigue. Negative for chills, decreased appetite, weight gain and weight loss.   HENT:  Negative for congestion, ear discharge, hoarse voice and tinnitus.    Eyes:  Negative for blurred vision, discharge, visual disturbance and visual halos.   Cardiovascular:  Positive for dyspnea on exertion. Negative for chest pain, claudication, irregular heartbeat, leg swelling, orthopnea and paroxysmal nocturnal dyspnea.   Respiratory:  Positive for cough and shortness of breath. Negative for sputum production and wheezing.    Endocrine: Negative for cold intolerance, heat intolerance and polyuria.   Hematologic/Lymphatic: Negative for adenopathy. Does not bruise/bleed easily.   Skin:  Positive for poor wound healing. Negative for dry skin, itching and suspicious lesions.   Musculoskeletal:  Negative for arthritis, back pain, falls, joint pain, muscle weakness and myalgias.   Gastrointestinal:  Negative for abdominal pain, constipation, diarrhea, dysphagia and hematemesis.   Genitourinary:  Negative for bladder incontinence, dysuria and frequency.   Neurological:  Positive for weakness. Negative for aphonia, disturbances in coordination and dizziness.   Psychiatric/Behavioral:  Negative for altered mental status, depression, memory loss and substance abuse. The patient does not have insomnia and is not  nervous/anxious.        Medications:     Allergies:   Allergies   Allergen Reactions    Hydrocodone Itching     Severe itching    Tylenol [Acetaminophen] Itching       Current Meds:   Current Facility-Administered Medications:     albuterol (PROVENTIL) nebulizer solution 0.083% 2.5 mg/3mL, 2.5 mg, Nebulization, Q6H PRN, Montez Dan MD    ALPRAZolam (XANAX) tablet 0.25 mg, 0.25 mg, Oral, BID PRN, Montez Dan MD    amiodarone (PACERONE) tablet 200 mg, 200 mg, Oral, Q24H, Montez Dan MD    apixaban (ELIQUIS) tablet 5 mg, 5 mg, Oral, Q12H, Montez Dan MD    bisacodyl (DULCOLAX) suppository 10 mg, 10 mg, Rectal, Daily PRN, Montez Dan MD    busPIRone (BUSPAR) tablet 5 mg, 5 mg, Oral, TID With Meals, Montez Dan MD    DULoxetine (CYMBALTA) DR capsule 60 mg, 60 mg, Oral, Daily, Montez Dan MD    First Mouthwash (Magic Mouthwash) 5 mL, 5 mL, Swish & Spit, TID PRN, Valencia Arias APRN    fluconazole (DIFLUCAN) tablet 150 mg, 150 mg, Oral, Once, Montez Dan MD    gabapentin (NEURONTIN) capsule 200 mg, 200 mg, Oral, TID, Montez Dan MD    hydrALAZINE (APRESOLINE) injection 5 mg, 5 mg, Intravenous, Q4H PRN, Montez Dan MD    ipratropium-albuterol (DUO-NEB) nebulizer solution 3 mL, 3 mL, Nebulization, 4x Daily - RT, Montez Dan MD    lidocaine (LIDODERM) 5 % 2 patch, 2 patch, Transdermal, Q24H, Montez Dan MD    metoprolol succinate XL (TOPROL-XL) 24 hr tablet 50 mg, 50 mg, Oral, Q24H, Montez Dan MD    Morphine sulfate (PF) injection 2 mg, 2 mg, Intravenous, Once per day on Tue Fri, Montez Dan MD    OLANZapine (zyPREXA) tablet 5 mg, 5 mg, Oral, Nightly, Montez Dan MD    ondansetron (ZOFRAN) tablet 4 mg, 4 mg, Oral, Q6H PRN **OR** ondansetron (ZOFRAN) injection 4 mg, 4 mg, Intravenous, Q6H PRN, Montez Dan MD    oxyCODONE (ROXICODONE) immediate  "release tablet 5 mg, 5 mg, Oral, Q6H PRN, Montez Dan MD    pantoprazole (PROTONIX) EC tablet 40 mg, 40 mg, Oral, BID, Montez Dan MD    polyethylene glycol (MIRALAX) packet 17 g, 17 g, Oral, BID, Montez Dan MD    sennosides-docusate (PERICOLACE) 8.6-50 MG per tablet 2 tablet, 2 tablet, Oral, BID, Montez Dan MD    simethicone (MYLICON) chewable tablet 80 mg, 80 mg, Oral, 4x Daily, Montez Dan MD    sodium chloride nasal spray 2 spray, 2 spray, Each Nare, PRN, Marilee Botello, BHAVESH    sodium hypochlorite (HYSEPT) 0.25 % topical solution, , Topical, Q12H, Montez Dan MD    traZODone (DESYREL) tablet 150 mg, 150 mg, Oral, Nightly, Montez Dan MD    vitamin D3 tablet 5,000 Units, 5,000 Units, Oral, Daily, Montez Dan MD    Data:     Code Status:    There are no questions and answers to display.       Family History   Problem Relation Age of Onset    Breast cancer Neg Hx     Ovarian cancer Neg Hx     Uterine cancer Neg Hx     Colon cancer Neg Hx     Melanoma Neg Hx        Social History     Socioeconomic History    Marital status:    Tobacco Use    Smoking status: Every Day     Packs/day: 1.00     Types: Cigarettes    Smokeless tobacco: Never   Substance and Sexual Activity    Alcohol use: Never    Drug use: Never    Sexual activity: Yes     Partners: Male     Birth control/protection: Surgical       Vitals:  Ht 172.7 cm (68\")   Wt 87.4 kg (192 lb 11.2 oz)   BMI 29.30 kg/m²   T 98.3 P 62 R 19 /71 Sp02 93% (room air)            I/O (24Hr):  No intake or output data in the 24 hours ending 09/02/23 0827    Labs and imaging:      No results found for this or any previous visit (from the past 12 hour(s)).                Physical Examination:        Physical Exam  Vitals and nursing note reviewed.   Constitutional:       Appearance: Normal appearance.   HENT:      Head: Normocephalic and atraumatic.      Right Ear: " External ear normal.      Left Ear: External ear normal.      Nose: Nose normal.      Mouth/Throat:      Mouth: Mucous membranes are moist.      Pharynx: Oropharynx is clear.   Eyes:      Extraocular Movements: Extraocular movements intact.      Conjunctiva/sclera: Conjunctivae normal.      Pupils: Pupils are equal, round, and reactive to light.   Neck:      Comments: Trach capped  Cardiovascular:      Rate and Rhythm: Normal rate and regular rhythm.      Pulses: Normal pulses.      Heart sounds: Normal heart sounds.   Pulmonary:      Effort: Pulmonary effort is normal.      Breath sounds: Normal breath sounds.   Abdominal:      General: Bowel sounds are normal.      Palpations: Abdomen is soft.   Musculoskeletal:      Comments: Generalized weakness   Skin:     Comments: Wound vac intact   Neurological:      Mental Status: She is alert and oriented to person, place, and time.   Psychiatric:         Mood and Affect: Mood normal.         Behavior: Behavior normal.         Assessment:             Acute tracheostomy management    Acute on chronic respiratory failure with hypoxia and hypercapnia    Past Medical History:   Diagnosis Date    Arthritis     Interstitial cystitis     Macular dystrophy     Neuropathy     Restless leg syndrome         Plan:        Acute hypoxic respiratory failure s/p tracheostomy tube  PAF  Acute RUE DVT  Chronic anticoagulation  Multifactorial anemia  Peripheral neuropathy  DM2 with hyperglycemia not on long term insulin  HTN  Anxiety  GERD  Constipation  Unstageable decubitus ulcer to coccyx  Continue current treatment. Monitor counts. Increase activity. Labs Tuesday. Oxygen weaning as tolerated. Trach management per ENT. Wound care per wound care team. Aggressive therapies as tolerated. Maintain patient safety.  Continue pain management efforts.       Electronically signed by BHAVESH Nunn on 9/2/2023 at 08:27 CDT

## 2023-09-03 RX ORDER — OXYCODONE HYDROCHLORIDE 5 MG/1
7.5 TABLET ORAL EVERY 6 HOURS PRN
Status: DISCONTINUED | OUTPATIENT
Start: 2023-09-03 | End: 2023-09-10

## 2023-09-03 RX ORDER — HYDROMORPHONE HYDROCHLORIDE 1 MG/ML
0.5 INJECTION, SOLUTION INTRAMUSCULAR; INTRAVENOUS; SUBCUTANEOUS EVERY 12 HOURS PRN
Status: DISCONTINUED | OUTPATIENT
Start: 2023-09-03 | End: 2023-09-10

## 2023-09-03 NOTE — PROGRESS NOTES
ANISH Carbajal APRN        Internal Medicine Progress Note    9/3/2023   09:11 CDT    Name:  Emili Schmitt  MRN:    [id number]     Acct:     [id number]   Room:  [address] [place] [address] Day: 0     Admit Date: 8/21/2023  6:54 PM  PCP: Remedios Qiu APRN    Subjective:     C/C: need for continued rehabilitation    Interval History: Status:  stayed the same. Resting in bed. No family at bedside. Wound vac intact. Trach remains capped patient tolerating well. Complains of pain, states when she gets her medication she can't tell it is doing anything.      Review of Systems   Constitutional: Positive for malaise/fatigue. Negative for chills, decreased appetite, weight gain and weight loss.   HENT:  Negative for congestion, ear discharge, hoarse voice and tinnitus.    Eyes:  Negative for blurred vision, discharge, visual disturbance and visual halos.   Cardiovascular:  Positive for dyspnea on exertion. Negative for chest pain, claudication, irregular heartbeat, leg swelling, orthopnea and paroxysmal nocturnal dyspnea.   Respiratory:  Positive for cough and shortness of breath. Negative for sputum production and wheezing.    Endocrine: Negative for cold intolerance, heat intolerance and polyuria.   Hematologic/Lymphatic: Negative for adenopathy. Does not bruise/bleed easily.   Skin:  Positive for poor wound healing. Negative for dry skin, itching and suspicious lesions.   Musculoskeletal:  Negative for arthritis, back pain, falls, joint pain, muscle weakness and myalgias.   Gastrointestinal:  Negative for abdominal pain, constipation, diarrhea, dysphagia and hematemesis.   Genitourinary:  Negative for bladder incontinence, dysuria and frequency.   Neurological:  Positive for weakness. Negative for aphonia, disturbances in coordination and dizziness.   Psychiatric/Behavioral:  Negative for altered mental status, depression, memory loss and substance abuse. The patient does not have  insomnia and is not nervous/anxious.        Medications:     Allergies:   Allergies   Allergen Reactions    Hydrocodone Itching     Severe itching    Tylenol [Acetaminophen] Itching       Current Meds:   Current Facility-Administered Medications:     albuterol (PROVENTIL) nebulizer solution 0.083% 2.5 mg/3mL, 2.5 mg, Nebulization, Q6H PRN, Montez Dan MD    ALPRAZolam (XANAX) tablet 0.25 mg, 0.25 mg, Oral, BID PRN, Montez Dan MD    amiodarone (PACERONE) tablet 200 mg, 200 mg, Oral, Q24H, Montez Dan MD    apixaban (ELIQUIS) tablet 5 mg, 5 mg, Oral, Q12H, Montez Dan MD    bisacodyl (DULCOLAX) suppository 10 mg, 10 mg, Rectal, Daily PRN, Montez Dan MD    busPIRone (BUSPAR) tablet 5 mg, 5 mg, Oral, TID With Meals, Montez Dan MD    DULoxetine (CYMBALTA) DR capsule 60 mg, 60 mg, Oral, Daily, Montez Dan MD    First Mouthwash (Magic Mouthwash) 5 mL, 5 mL, Swish & Spit, TID PRN, Valencia Arias APRN    gabapentin (NEURONTIN) capsule 200 mg, 200 mg, Oral, TID, Montez Dan MD    hydrALAZINE (APRESOLINE) injection 5 mg, 5 mg, Intravenous, Q4H PRN, Montez Dan MD    HYDROmorphone (DILAUDID) injection 1 mg, 1 mg, Intravenous, Once, Valencia Arias APRN    ipratropium-albuterol (DUO-NEB) nebulizer solution 3 mL, 3 mL, Nebulization, 4x Daily - RT, Montez Dan MD    lidocaine (LIDODERM) 5 % 2 patch, 2 patch, Transdermal, Q24H, Montez Dan MD    metoprolol succinate XL (TOPROL-XL) 24 hr tablet 50 mg, 50 mg, Oral, Q24H, Montez Dan MD    Morphine sulfate (PF) injection 2 mg, 2 mg, Intravenous, Once per day on Tue Fri, Montez Dna MD    OLANZapine (zyPREXA) tablet 5 mg, 5 mg, Oral, Nightly, Montez Dan MD    ondansetron (ZOFRAN) tablet 4 mg, 4 mg, Oral, Q6H PRN **OR** ondansetron (ZOFRAN) injection 4 mg, 4 mg, Intravenous, Q6H PRN, Montez Dan MD    oxyCODONE  "(ROXICODONE) immediate release tablet 5 mg, 5 mg, Oral, Q6H PRN, Montez Dan MD    pantoprazole (PROTONIX) EC tablet 40 mg, 40 mg, Oral, BID, Montez Dan MD    polyethylene glycol (MIRALAX) packet 17 g, 17 g, Oral, BID, Montez Dan MD    sennosides-docusate (PERICOLACE) 8.6-50 MG per tablet 2 tablet, 2 tablet, Oral, BID, Montez Dan MD    simethicone (MYLICON) chewable tablet 80 mg, 80 mg, Oral, 4x Daily, Montez Dan MD    sodium chloride nasal spray 2 spray, 2 spray, Each Nare, PRN, Marilee Botello APRN    sodium hypochlorite (HYSEPT) 0.25 % topical solution, , Topical, Q12H, Montez Dan MD    traZODone (DESYREL) tablet 150 mg, 150 mg, Oral, Nightly, Montez Dan MD    vitamin D3 tablet 5,000 Units, 5,000 Units, Oral, Daily, Montez Dan MD    Data:     Code Status:    There are no questions and answers to display.       Family History   Problem Relation Age of Onset    Breast cancer Neg Hx     Ovarian cancer Neg Hx     Uterine cancer Neg Hx     Colon cancer Neg Hx     Melanoma Neg Hx        Social History     Socioeconomic History    Marital status:    Tobacco Use    Smoking status: Every Day     Packs/day: 1.00     Types: Cigarettes    Smokeless tobacco: Never   Substance and Sexual Activity    Alcohol use: Never    Drug use: Never    Sexual activity: Yes     Partners: Male     Birth control/protection: Surgical       Vitals:  Ht 172.7 cm (68\")   Wt 87.4 kg (192 lb 11.2 oz)   BMI 29.30 kg/m²   T 98.2 P 62 R 22 /85 Sp02 97% (room air)            I/O (24Hr):  No intake or output data in the 24 hours ending 09/03/23 0911    Labs and imaging:      No results found for this or any previous visit (from the past 12 hour(s)).                Physical Examination:        Physical Exam  Vitals and nursing note reviewed.   Constitutional:       Appearance: Normal appearance.   HENT:      Head: Normocephalic and " atraumatic.      Right Ear: External ear normal.      Left Ear: External ear normal.      Nose: Nose normal.      Mouth/Throat:      Mouth: Mucous membranes are moist.      Pharynx: Oropharynx is clear.   Eyes:      Extraocular Movements: Extraocular movements intact.      Conjunctiva/sclera: Conjunctivae normal.      Pupils: Pupils are equal, round, and reactive to light.   Neck:      Comments: Trach capped  Cardiovascular:      Rate and Rhythm: Normal rate and regular rhythm.      Pulses: Normal pulses.      Heart sounds: Normal heart sounds.   Pulmonary:      Effort: Pulmonary effort is normal.      Breath sounds: Normal breath sounds.   Abdominal:      General: Bowel sounds are normal.      Palpations: Abdomen is soft.   Musculoskeletal:      Comments: Generalized weakness   Skin:     Comments: Wound vac intact   Neurological:      Mental Status: She is alert and oriented to person, place, and time.   Psychiatric:         Mood and Affect: Mood normal.         Behavior: Behavior normal.         Assessment:             Acute tracheostomy management    Acute on chronic respiratory failure with hypoxia and hypercapnia    Past Medical History:   Diagnosis Date    Arthritis     Interstitial cystitis     Macular dystrophy     Neuropathy     Restless leg syndrome         Plan:        Acute hypoxic respiratory failure s/p tracheostomy tube  PAF  Acute RUE DVT  Chronic anticoagulation  Multifactorial anemia  Peripheral neuropathy  DM2 with hyperglycemia not on long term insulin  HTN  Anxiety  GERD  Constipation  Unstageable decubitus ulcer to coccyx  Continue current treatment. Monitor counts. Increase activity. Labs Tuesday. Oxygen weaning as tolerated. Trach management per ENT. Wound care per wound care team. Aggressive therapies as tolerated. Maintain patient safety.  Continue pain management efforts. Increase Oxycodone to 7.5mg       Electronically signed by BHAVESH Nunn on 9/3/2023 at 09:11 CDT     I have  discussed the care of Emili Schmitt, including pertinent history and exam findings, with the nurse practitioner.    I have seen and examined the patient and the key elements of all parts of the encounter have been performed by me.  I agree with the assessment, plan and orders as documented by BHAVESH Velázquez, after I modified the exam findings and the plan of treatments and the final version is my approved version of the assessment.        Electronically signed by Montez Dan MD on 9/4/2023 at 07:33 CDT

## 2023-09-04 PROCEDURE — 97168 OT RE-EVAL EST PLAN CARE: CPT

## 2023-09-04 PROCEDURE — 97535 SELF CARE MNGMENT TRAINING: CPT

## 2023-09-04 PROCEDURE — 97110 THERAPEUTIC EXERCISES: CPT

## 2023-09-04 PROCEDURE — 97530 THERAPEUTIC ACTIVITIES: CPT

## 2023-09-04 PROCEDURE — 25010000002 HYDROMORPHONE PER 4 MG: Performed by: NURSE PRACTITIONER

## 2023-09-04 NOTE — PROGRESS NOTES
ANISH Carbajal APRN        Internal Medicine Progress Note    9/4/2023   08:36 CDT    Name:  Emili Schmitt  MRN:    [id number]     Acct:     [id number]   Room:  [address] [place] [address] Day: 0     Admit Date: 8/21/2023  6:54 PM  PCP: Remedios Qiu APRN    Subjective:     C/C: need for continued rehabilitation    Interval History: Status:  stayed the same. Resting in bed. No family at bedside. Wound vac intact. Continues to do well with trach capped.  Pt complains of pain at present. Staff notified and will administer prn medication.      Review of Systems   Constitutional: Positive for malaise/fatigue. Negative for chills, decreased appetite, weight gain and weight loss.   HENT:  Negative for congestion, ear discharge, hoarse voice and tinnitus.    Eyes:  Negative for blurred vision, discharge, visual disturbance and visual halos.   Cardiovascular:  Positive for dyspnea on exertion. Negative for chest pain, claudication, irregular heartbeat, leg swelling, orthopnea and paroxysmal nocturnal dyspnea.   Respiratory:  Positive for cough and shortness of breath. Negative for sputum production and wheezing.    Endocrine: Negative for cold intolerance, heat intolerance and polyuria.   Hematologic/Lymphatic: Negative for adenopathy. Does not bruise/bleed easily.   Skin:  Positive for poor wound healing. Negative for dry skin, itching and suspicious lesions.   Musculoskeletal:  Negative for arthritis, back pain, falls, joint pain, muscle weakness and myalgias.   Gastrointestinal:  Negative for abdominal pain, constipation, diarrhea, dysphagia and hematemesis.   Genitourinary:  Negative for bladder incontinence, dysuria and frequency.   Neurological:  Positive for weakness. Negative for aphonia, disturbances in coordination and dizziness.   Psychiatric/Behavioral:  Negative for altered mental status, depression, memory loss and substance abuse. The patient does not have insomnia and  is not nervous/anxious.        Medications:     Allergies:   Allergies   Allergen Reactions    Hydrocodone Itching     Severe itching    Tylenol [Acetaminophen] Itching       Current Meds:   Current Facility-Administered Medications:     albuterol (PROVENTIL) nebulizer solution 0.083% 2.5 mg/3mL, 2.5 mg, Nebulization, Q6H PRN, Montez Dan MD    ALPRAZolam (XANAX) tablet 0.25 mg, 0.25 mg, Oral, BID PRN, Montez Dan MD    amiodarone (PACERONE) tablet 200 mg, 200 mg, Oral, Q24H, Montez Dan MD    apixaban (ELIQUIS) tablet 5 mg, 5 mg, Oral, Q12H, Montez Dan MD    bisacodyl (DULCOLAX) suppository 10 mg, 10 mg, Rectal, Daily PRN, Montez Dan MD    busPIRone (BUSPAR) tablet 5 mg, 5 mg, Oral, TID With Meals, Montez Dan MD    DULoxetine (CYMBALTA) DR capsule 60 mg, 60 mg, Oral, Daily, Montez Dan MD    First Mouthwash (Magic Mouthwash) 5 mL, 5 mL, Swish & Spit, TID PRN, Valencia Arias APRN    gabapentin (NEURONTIN) capsule 200 mg, 200 mg, Oral, TID, Montez Dan MD    hydrALAZINE (APRESOLINE) injection 5 mg, 5 mg, Intravenous, Q4H PRN, Montez Dan MD    HYDROmorphone (DILAUDID) injection 0.5 mg, 0.5 mg, Intravenous, Q12H PRN, Valencia Arias APRN    HYDROmorphone (DILAUDID) injection 1 mg, 1 mg, Intravenous, Once, Valencia Arias APRN    ipratropium-albuterol (DUO-NEB) nebulizer solution 3 mL, 3 mL, Nebulization, 4x Daily - RT, Montez Dan MD    lidocaine (LIDODERM) 5 % 2 patch, 2 patch, Transdermal, Q24H, Montez Dan MD    metoprolol succinate XL (TOPROL-XL) 24 hr tablet 50 mg, 50 mg, Oral, Q24H, Montez Dan MD    Morphine sulfate (PF) injection 2 mg, 2 mg, Intravenous, Once per day on Tue Fri, Montez Dan MD    OLANZapine (zyPREXA) tablet 5 mg, 5 mg, Oral, Nightly, Montez Dan MD    ondansetron (ZOFRAN) tablet 4 mg, 4 mg, Oral, Q6H PRN **OR** ondansetron (ZOFRAN)  "injection 4 mg, 4 mg, Intravenous, Q6H PRN, Montez Dan MD    oxyCODONE (ROXICODONE) immediate release tablet 7.5 mg, 7.5 mg, Oral, Q6H PRN, Valencia Arias R, APRN    pantoprazole (PROTONIX) EC tablet 40 mg, 40 mg, Oral, BID, Montez Dan MD    polyethylene glycol (MIRALAX) packet 17 g, 17 g, Oral, BID, Montez Dan MD    sennosides-docusate (PERICOLACE) 8.6-50 MG per tablet 2 tablet, 2 tablet, Oral, BID, Montez Dan MD    simethicone (MYLICON) chewable tablet 80 mg, 80 mg, Oral, 4x Daily, Montez Dan MD    sodium chloride nasal spray 2 spray, 2 spray, Each Nare, PRN, Marilee Botello APRN    sodium hypochlorite (HYSEPT) 0.25 % topical solution, , Topical, Q12H, Montez Dan MD    traZODone (DESYREL) tablet 150 mg, 150 mg, Oral, Nightly, Montez Dan MD    vitamin D3 tablet 5,000 Units, 5,000 Units, Oral, Daily, Montez Dan MD    Data:     Code Status:    There are no questions and answers to display.       Family History   Problem Relation Age of Onset    Breast cancer Neg Hx     Ovarian cancer Neg Hx     Uterine cancer Neg Hx     Colon cancer Neg Hx     Melanoma Neg Hx        Social History     Socioeconomic History    Marital status:    Tobacco Use    Smoking status: Every Day     Packs/day: 1.00     Types: Cigarettes    Smokeless tobacco: Never   Substance and Sexual Activity    Alcohol use: Never    Drug use: Never    Sexual activity: Yes     Partners: Male     Birth control/protection: Surgical       Vitals:  Ht 172.7 cm (68\")   Wt 87.9 kg (193 lb 12.8 oz)   BMI 29.47 kg/m²   T 97.7 P 73 R 14 /54 Sp02 90% (room air)            I/O (24Hr):  No intake or output data in the 24 hours ending 09/04/23 0836    Labs and imaging:      No results found for this or any previous visit (from the past 12 hour(s)).                Physical Examination:        Physical Exam  Vitals and nursing note reviewed. "   Constitutional:       Appearance: Normal appearance.   HENT:      Head: Normocephalic and atraumatic.      Right Ear: External ear normal.      Left Ear: External ear normal.      Nose: Nose normal.      Mouth/Throat:      Mouth: Mucous membranes are moist.      Pharynx: Oropharynx is clear.   Eyes:      Extraocular Movements: Extraocular movements intact.      Conjunctiva/sclera: Conjunctivae normal.      Pupils: Pupils are equal, round, and reactive to light.   Neck:      Comments: Trach capped  Cardiovascular:      Rate and Rhythm: Normal rate and regular rhythm.      Pulses: Normal pulses.      Heart sounds: Normal heart sounds.   Pulmonary:      Effort: Pulmonary effort is normal.      Breath sounds: Normal breath sounds.   Abdominal:      General: Bowel sounds are normal.      Palpations: Abdomen is soft.   Musculoskeletal:      Comments: Generalized weakness   Skin:     Comments: Wound vac intact   Neurological:      Mental Status: She is alert and oriented to person, place, and time.   Psychiatric:         Mood and Affect: Mood normal.         Behavior: Behavior normal.         Assessment:             Acute tracheostomy management    Acute on chronic respiratory failure with hypoxia and hypercapnia    Past Medical History:   Diagnosis Date    Arthritis     Interstitial cystitis     Macular dystrophy     Neuropathy     Restless leg syndrome         Plan:        Acute hypoxic respiratory failure s/p tracheostomy tube  PAF  Acute RUE DVT  Chronic anticoagulation  Multifactorial anemia  Peripheral neuropathy  DM2 with hyperglycemia not on long term insulin  HTN  Anxiety  GERD  Constipation  Unstageable decubitus ulcer to coccyx  Continue current treatment. Monitor counts. Increase activity. Labs Tuesday. Oxygen weaning as tolerated. Trach management per ENT. Wound care per wound care team. Aggressive therapies as tolerated. Maintain patient safety.  Continue pain management efforts.      Electronically signed  by BHAVESH Nunn on 9/4/2023 at 08:36 CDT

## 2023-09-05 LAB
ALBUMIN SERPL-MCNC: 3.3 G/DL (ref 3.5–5.2)
ALBUMIN/GLOB SERPL: 1.2 G/DL
ALP SERPL-CCNC: 78 U/L (ref 39–117)
ALT SERPL W P-5'-P-CCNC: 23 U/L (ref 1–33)
ANION GAP SERPL CALCULATED.3IONS-SCNC: 9 MMOL/L (ref 5–15)
AST SERPL-CCNC: 13 U/L (ref 1–32)
BILIRUB SERPL-MCNC: 0.2 MG/DL (ref 0–1.2)
BUN SERPL-MCNC: 8 MG/DL (ref 8–23)
BUN/CREAT SERPL: 28.6 (ref 7–25)
CALCIUM SPEC-SCNC: 10.1 MG/DL (ref 8.6–10.5)
CHLORIDE SERPL-SCNC: 101 MMOL/L (ref 98–107)
CO2 SERPL-SCNC: 30 MMOL/L (ref 22–29)
CREAT SERPL-MCNC: 0.28 MG/DL (ref 0.57–1)
DEPRECATED RDW RBC AUTO: 54.3 FL (ref 37–54)
EGFRCR SERPLBLD CKD-EPI 2021: 122.9 ML/MIN/1.73
ERYTHROCYTE [DISTWIDTH] IN BLOOD BY AUTOMATED COUNT: 15.2 % (ref 12.3–15.4)
ERYTHROCYTE [SEDIMENTATION RATE] IN BLOOD: 67 MM/HR (ref 0–30)
GLOBULIN UR ELPH-MCNC: 2.8 GM/DL
GLUCOSE SERPL-MCNC: 123 MG/DL (ref 65–99)
HCT VFR BLD AUTO: 31.8 % (ref 34–46.6)
HGB BLD-MCNC: 9.5 G/DL (ref 12–15.9)
MCH RBC QN AUTO: 29.1 PG (ref 26.6–33)
MCHC RBC AUTO-ENTMCNC: 29.9 G/DL (ref 31.5–35.7)
MCV RBC AUTO: 97.5 FL (ref 79–97)
PLATELET # BLD AUTO: 372 10*3/MM3 (ref 140–450)
PMV BLD AUTO: 10.1 FL (ref 6–12)
POTASSIUM SERPL-SCNC: 4.3 MMOL/L (ref 3.5–5.2)
PROT SERPL-MCNC: 6.1 G/DL (ref 6–8.5)
RBC # BLD AUTO: 3.26 10*6/MM3 (ref 3.77–5.28)
SODIUM SERPL-SCNC: 140 MMOL/L (ref 136–145)
WBC NRBC COR # BLD: 6.45 10*3/MM3 (ref 3.4–10.8)

## 2023-09-05 PROCEDURE — 31575 DIAGNOSTIC LARYNGOSCOPY: CPT | Performed by: OTOLARYNGOLOGY

## 2023-09-05 PROCEDURE — 99232 SBSQ HOSP IP/OBS MODERATE 35: CPT | Performed by: OTOLARYNGOLOGY

## 2023-09-05 PROCEDURE — 85652 RBC SED RATE AUTOMATED: CPT | Performed by: INTERNAL MEDICINE

## 2023-09-05 PROCEDURE — 85027 COMPLETE CBC AUTOMATED: CPT | Performed by: INTERNAL MEDICINE

## 2023-09-05 PROCEDURE — 80053 COMPREHEN METABOLIC PANEL: CPT | Performed by: INTERNAL MEDICINE

## 2023-09-05 PROCEDURE — 25010000002 MORPHINE PER 10 MG: Performed by: INTERNAL MEDICINE

## 2023-09-05 PROCEDURE — 25010000002 HYDROMORPHONE PER 4 MG: Performed by: NURSE PRACTITIONER

## 2023-09-05 PROCEDURE — 31502 CHANGE OF WINDPIPE AIRWAY: CPT | Performed by: OTOLARYNGOLOGY

## 2023-09-05 PROCEDURE — 31615 TRCHEOBRNCHSC EST TRACHS INC: CPT | Performed by: OTOLARYNGOLOGY

## 2023-09-05 RX ORDER — ALPRAZOLAM 0.25 MG/1
0.25 TABLET ORAL 2 TIMES DAILY PRN
Status: DISCONTINUED | OUTPATIENT
Start: 2023-09-06 | End: 2023-09-05

## 2023-09-05 RX ORDER — MORPHINE SULFATE 2 MG/ML
2 INJECTION, SOLUTION INTRAMUSCULAR; INTRAVENOUS
Status: DISPENSED | OUTPATIENT
Start: 2023-09-05 | End: 2023-09-12

## 2023-09-05 RX ORDER — ALPRAZOLAM 0.25 MG/1
0.25 TABLET ORAL 2 TIMES DAILY PRN
Status: ACTIVE | OUTPATIENT
Start: 2023-09-05 | End: 2023-09-06

## 2023-09-05 NOTE — PROGRESS NOTES
ANISH Carbajal APRN        Internal Medicine Progress Note    9/5/2023   12:31 CDT    Name:  Emili Schmitt  MRN:    [id number]     Acct:     [id number]   Room:  [address] [place] [address] Day: 0     Admit Date: 8/21/2023  6:54 PM  PCP: Remedios Qiu APRN    Subjective:     C/C: need for continued rehabilitation    Interval History: Status:  stayed the same. Resting in bed. Daughter at bedside. Afebrile. 02 sats stable with 02 at 2 lpm. Tolerated trach decannulation earlier today.  Counts stable. Hopeful for transfer to [facility] swing bed to continue wound care and therapy closer to home.     Review of Systems   Constitutional: Positive for malaise/fatigue. Negative for chills, decreased appetite, weight gain and weight loss.   HENT:  Negative for congestion, ear discharge, hoarse voice and tinnitus.    Eyes:  Negative for blurred vision, discharge, visual disturbance and visual halos.   Cardiovascular:  Positive for dyspnea on exertion. Negative for chest pain, claudication, irregular heartbeat, leg swelling, orthopnea and paroxysmal nocturnal dyspnea.   Respiratory:  Positive for cough and shortness of breath. Negative for sputum production and wheezing.    Endocrine: Negative for cold intolerance, heat intolerance and polyuria.   Hematologic/Lymphatic: Negative for adenopathy. Does not bruise/bleed easily.   Skin:  Positive for poor wound healing. Negative for dry skin, itching and suspicious lesions.   Musculoskeletal:  Negative for arthritis, back pain, falls, joint pain, muscle weakness and myalgias.   Gastrointestinal:  Negative for abdominal pain, constipation, diarrhea, dysphagia and hematemesis.   Genitourinary:  Negative for bladder incontinence, dysuria and frequency.   Neurological:  Positive for weakness. Negative for aphonia, disturbances in coordination and dizziness.   Psychiatric/Behavioral:  Negative for altered mental status, depression, memory loss and  substance abuse. The patient does not have insomnia and is not nervous/anxious.        Medications:     Allergies:   Allergies   Allergen Reactions    Hydrocodone Itching     Severe itching    Tylenol [Acetaminophen] Itching       Current Meds:   Current Facility-Administered Medications:     albuterol (PROVENTIL) nebulizer solution 0.083% 2.5 mg/3mL, 2.5 mg, Nebulization, Q6H PRN, Montez Dan MD    ALPRAZolam (XANAX) tablet 0.25 mg, 0.25 mg, Oral, BID PRN, Montez Dan MD    amiodarone (PACERONE) tablet 200 mg, 200 mg, Oral, Q24H, Montez Dan MD    apixaban (ELIQUIS) tablet 5 mg, 5 mg, Oral, Q12H, Montez Dan MD    bisacodyl (DULCOLAX) suppository 10 mg, 10 mg, Rectal, Daily PRN, Montez Dan MD    busPIRone (BUSPAR) tablet 5 mg, 5 mg, Oral, TID With Meals, Montez Dan MD    DULoxetine (CYMBALTA) DR capsule 60 mg, 60 mg, Oral, Daily, Montez Dan MD    First Mouthwash (Magic Mouthwash) 5 mL, 5 mL, Swish & Spit, TID PRN, Valencia Arias APRN    gabapentin (NEURONTIN) capsule 200 mg, 200 mg, Oral, TID, Montez Dan MD    hydrALAZINE (APRESOLINE) injection 5 mg, 5 mg, Intravenous, Q4H PRN, Montez Dan MD    HYDROmorphone (DILAUDID) injection 0.5 mg, 0.5 mg, Intravenous, Q12H PRN, Valencia Arias APRN    HYDROmorphone (DILAUDID) injection 1 mg, 1 mg, Intravenous, Once, Valencia Arias APRN    ipratropium-albuterol (DUO-NEB) nebulizer solution 3 mL, 3 mL, Nebulization, 4x Daily - RT, Montez Dan MD    lidocaine (LIDODERM) 5 % 2 patch, 2 patch, Transdermal, Q24H, Montez Dan MD    metoprolol succinate XL (TOPROL-XL) 24 hr tablet 50 mg, 50 mg, Oral, Q24H, Montez Dan MD    Morphine sulfate (PF) injection 2 mg, 2 mg, Intravenous, Once per day on Tue Fri, Montez Dan MD    OLANZapine (zyPREXA) tablet 5 mg, 5 mg, Oral, Nightly, Montez Dan MD    ondansetron (ZOFRAN)  "tablet 4 mg, 4 mg, Oral, Q6H PRN **OR** ondansetron (ZOFRAN) injection 4 mg, 4 mg, Intravenous, Q6H PRN, Montez Dan MD    oxyCODONE (ROXICODONE) immediate release tablet 7.5 mg, 7.5 mg, Oral, Q6H PRN, Valencia Arias R, APRN    pantoprazole (PROTONIX) EC tablet 40 mg, 40 mg, Oral, BID, Montez Dan MD    polyethylene glycol (MIRALAX) packet 17 g, 17 g, Oral, BID, Montez Dan MD    sennosides-docusate (PERICOLACE) 8.6-50 MG per tablet 2 tablet, 2 tablet, Oral, BID, Montez Dan MD    simethicone (MYLICON) chewable tablet 80 mg, 80 mg, Oral, 4x Daily, Montez Dan MD    sodium chloride nasal spray 2 spray, 2 spray, Each Nare, PRN, Marilee Botello APRN    sodium hypochlorite (HYSEPT) 0.25 % topical solution, , Topical, Q12H, Montez Dan MD    traZODone (DESYREL) tablet 150 mg, 150 mg, Oral, Nightly, Montez Dan MD    vitamin D3 tablet 5,000 Units, 5,000 Units, Oral, Daily, Montez Dan MD    Data:     Code Status:    There are no questions and answers to display.       Family History   Problem Relation Age of Onset    Breast cancer Neg Hx     Ovarian cancer Neg Hx     Uterine cancer Neg Hx     Colon cancer Neg Hx     Melanoma Neg Hx        Social History     Socioeconomic History    Marital status:    Tobacco Use    Smoking status: Every Day     Packs/day: 1.00     Types: Cigarettes    Smokeless tobacco: Never   Substance and Sexual Activity    Alcohol use: Never    Drug use: Never    Sexual activity: Yes     Partners: Male     Birth control/protection: Surgical       Vitals:  Ht 172.7 cm (68\")   Wt 87.9 kg (193 lb 12.8 oz)   BMI 29.47 kg/m²   T 97.1 P 59 R 18 /73 Sp02 94% (2 lpm)            I/O (24Hr):  No intake or output data in the 24 hours ending 09/05/23 1231    Labs and imaging:      Recent Results (from the past 12 hour(s))   CBC (No Diff)    Collection Time: 09/05/23  5:29 AM    Specimen: Blood   Result " Value Ref Range    WBC 6.45 3.40 - 10.80 10*3/mm3    RBC 3.26 (L) 3.77 - 5.28 10*6/mm3    Hemoglobin 9.5 (L) 12.0 - 15.9 g/dL    Hematocrit 31.8 (L) 34.0 - 46.6 %    MCV 97.5 (H) 79.0 - 97.0 fL    MCH 29.1 26.6 - 33.0 pg    MCHC 29.9 (L) 31.5 - 35.7 g/dL    RDW 15.2 12.3 - 15.4 %    RDW-SD 54.3 (H) 37.0 - 54.0 fl    MPV 10.1 6.0 - 12.0 fL    Platelets 372 140 - 450 10*3/mm3   Sedimentation Rate    Collection Time: 09/05/23  5:29 AM    Specimen: Blood   Result Value Ref Range    Sed Rate 67 (H) 0 - 30 mm/hr   Comprehensive Metabolic Panel    Collection Time: 09/05/23  5:29 AM    Specimen: Blood   Result Value Ref Range    Glucose 123 (H) 65 - 99 mg/dL    BUN 8 8 - 23 mg/dL    Creatinine 0.28 (L) 0.57 - 1.00 mg/dL    Sodium 140 136 - 145 mmol/L    Potassium 4.3 3.5 - 5.2 mmol/L    Chloride 101 98 - 107 mmol/L    CO2 30.0 (H) 22.0 - 29.0 mmol/L    Calcium 10.1 8.6 - 10.5 mg/dL    Total Protein 6.1 6.0 - 8.5 g/dL    Albumin 3.3 (L) 3.5 - 5.2 g/dL    ALT (SGPT) 23 1 - 33 U/L    AST (SGOT) 13 1 - 32 U/L    Alkaline Phosphatase 78 39 - 117 U/L    Total Bilirubin 0.2 0.0 - 1.2 mg/dL    Globulin 2.8 gm/dL    A/G Ratio 1.2 g/dL    BUN/Creatinine Ratio 28.6 (H) 7.0 - 25.0    Anion Gap 9.0 5.0 - 15.0 mmol/L    eGFR 122.9 >60.0 mL/min/1.73                   Physical Examination:        Physical Exam  Vitals and nursing note reviewed.   Constitutional:       Appearance: Normal appearance.   HENT:      Head: Normocephalic and atraumatic.      Right Ear: External ear normal.      Left Ear: External ear normal.      Nose: Nose normal.      Mouth/Throat:      Mouth: Mucous membranes are moist.      Pharynx: Oropharynx is clear.   Eyes:      Extraocular Movements: Extraocular movements intact.      Conjunctiva/sclera: Conjunctivae normal.      Pupils: Pupils are equal, round, and reactive to light.   Neck:      Comments: Dressing c.d.I.  Cardiovascular:      Rate and Rhythm: Normal rate and regular rhythm.      Pulses: Normal pulses.       Heart sounds: Normal heart sounds.   Pulmonary:      Effort: Pulmonary effort is normal.      Breath sounds: Normal breath sounds.   Abdominal:      General: Bowel sounds are normal.      Palpations: Abdomen is soft.   Musculoskeletal:      Comments: Generalized weakness   Skin:     Comments: Wound vac intact   Neurological:      Mental Status: She is alert and oriented to person, place, and time.      Comments: Intention tremor - worse to LUE   Psychiatric:         Mood and Affect: Mood normal.         Behavior: Behavior normal.         Assessment:             Acute tracheostomy management    Acute on chronic respiratory failure with hypoxia and hypercapnia    Past Medical History:   Diagnosis Date    Arthritis     Interstitial cystitis     Macular dystrophy     Neuropathy     Restless leg syndrome         Plan:        Acute hypoxic respiratory failure s/p tracheostomy tube  PAF  Acute RUE DVT  Chronic anticoagulation  Multifactorial anemia  Peripheral neuropathy  DM2 with hyperglycemia not on long term insulin  HTN  Anxiety  GERD  Constipation  Unstageable decubitus ulcer to coccyx  Continue current treatment. Monitor counts. Increase activity. Labs Thursday. Oxygen weaning as tolerated.  Wound care per wound care team. Aggressive therapies as tolerated. Maintain patient safety.  Continue pain management efforts.      Electronically signed by BHAVESH Mills on 9/5/2023 at 12:31 CDT     I have discussed the care of Emili Schmitt, including pertinent history and exam findings, with the nurse practitioner.    I have seen and examined the patient and the key elements of all parts of the encounter have been performed by me.  I agree with the assessment, plan and orders as documented by BHAVESH Sheehan, after I modified the exam findings and the plan of treatments and the final version is my approved version of the assessment.        Electronically signed by Montez Dan MD on  9/5/2023 at 19:41 CDT

## 2023-09-05 NOTE — PROGRESS NOTES
[facility] Otolaryngology Head and Neck Surgery  INPATIENT PROGRESS NOTE    Patient Name: Emili Schmitt  : 1960   MRN: [id number]  Date of Admission: 2023  Today's Date: 23  Length of Stay: 0  [unfilled]   Montez Dan MD   Primary Care Physician: Remedios Qiu APRN  Surgical Procedures Since Admission:    Subjective   Subjective   Chief Complaint: Tracheostomy management  HPI   Accompanied by: RT, daughter  Since last examined, Emili Schmitt has remained stable for over a week with her trach capped.  She is using nasal O2.  She is eating well.  She is vocalizing well.  She has no complaints.  She wishes to be decannulated.  RT reports the patient has had no issues with the trach.  She requires no suction.  She is remained capped.  Patient is seen, chart is reviewed    Review of Systems   No change from prior inquiry  All pertinent negatives and positives are as above. All other systems have been reviewed and are negative unless otherwise stated.   Objective   Objective   Vitals:        Physical Exam  Vitals reviewed.   Constitutional:       Appearance: Normal appearance. She is well-developed. She is obese.      Interventions: She is intubated.      Comments: Lying in bed, trach in position, trach collar, trach capped.  Appears to be breathing comfortably.  Significant tremors   HENT:      Head: Normocephalic and atraumatic.      Comments: Lower face tremors     Right Ear: Hearing and external ear normal.      Left Ear: Hearing and external ear normal.      Nose: Nose normal.      Mouth/Throat:      Lips: Pink.      Mouth: Mucous membranes are dry.      Dentition: Normal dentition. No gum lesions.      Tongue: No lesions. Tongue does not deviate from midline.      Palate: No mass and lesions.      Pharynx: Oropharynx is clear. Uvula midline.   Eyes:      General: Lids are normal. Gaze aligned appropriately.      Extraocular Movements: Extraocular  movements intact.      Conjunctiva/sclera: Conjunctivae normal.   Neck:      Trachea: Tracheostomy present.      Comments: my tube type: Shiley #6 uncuffed DIC, flexible shaft    Stoma: Healing appropriately    Secretions: Minimal  Trach capped    Voice: Stronger  Date placed: 8/8/2023  Date decannulated: 9/5/2023  Refer to scope note for detail    Pulmonary:      Effort: Pulmonary effort is normal. No tachypnea, respiratory distress or retractions. She is intubated.      Breath sounds: No stridor.      Comments: Trach in position, trach collar, capped  Musculoskeletal:      Cervical back: No rigidity or crepitus. Decreased range of motion.   Lymphadenopathy:      Cervical: No cervical adenopathy.   Skin:     Findings: No rash.   Neurological:      General: No focal deficit present.      Mental Status: She is alert and oriented to person, place, and time.      Cranial Nerves: Cranial nerves 2-12 are intact. No cranial nerve deficit.      Comments: Entire body with tremors   Psychiatric:         Attention and Perception: Attention and perception normal.         Mood and Affect: Mood and affect normal.         Behavior: Behavior normal. Behavior is cooperative.         Thought Content: Thought content normal.         Cognition and Memory: Cognition normal.         Judgment: Judgment normal.         Result Review    Result Review:  I have personally reviewed the results from the time of this admission to 9/5/2023 12:43 CDT and agree with these findings:  []  Laboratory  []  Microbiology  []  Radiology  []  EKG/Telemetry   []  Cardiology/Vascular   []  Pathology  []  Old records  []  Other:  Most notable findings include: None pertinent to ENT today    Assessment & Plan   Assessment / Plan   Brief Patient Summary:  Emili Schmitt is a 62 y.o. female who has remained stable with her trach capped over the last several days.  She now wishes decannulation.  I have decannulated patient today.  I will continue trach  site care.    Active Hospital Problems:   Active Hospital Problems    Diagnosis     Acute tracheostomy management     Acute on chronic respiratory failure with hypoxia and hypercapnia      Plan:   Tracheostomy management-the patient is decannulated today.  She will start [facility] site care.  Respiratory failure-stable  Tremors-Per primary team  Discussed Plan with patient, daughter, RT  Following patient as in-patient. Further recommendations will be made based on serial examinations.    Medications/Orders for this encounter: No new medications ordered.  Orders-[facility] site care    Discharge Planning: Per primary team        DVT prophylaxis:  Medical DVT prophylaxis orders are present.     Electronically signed by Jef Velázquez Jr, MD, 09/05/23, 12:11 PM CDT.

## 2023-09-05 NOTE — PROCEDURES
[facility] Otolaryngology Head and Neck Surgery  PROCEDURE NOTE    Anesthesia: none    Indications for Procedure:     Acute tracheostomy management    Acute on chronic respiratory failure with hypoxia and hypercapnia       Endoscopy Type: Flexible Laryngoscopy    Procedure Details:    The patient was placed in an upright position.  The laryngoscope was passed right nasal passge.  The nasal cavities, nasopharynx, oropharynx, hypopharynx, and larynx were all examined.  Vocal cords were examined during respiration and phonation.  The following findings were noted:    Findings:   LARYNGOSCOPY FINDINGS :    NASOPHARYNX:     adenoids  flat, no lesions, Eustachian tubes normal, without lesions, palate with normal mobility without lesions.  OROPHARYNX:     normal mucosa, without lesions, tonsils  atrophic    VALLECULA:         normal, no lesions, no pooling  Bilateral  EPIGLOTTIS:    Position: Normal    Color: Minimal red    Mucosa: Minimal thickening  HYPOPHARYNX:    Lateral walls:         BILATERAL: Mildly redundant    Posterior wall:       normal. no lesions  ARYEPIGLOTTIC FOLDS:     normal mucosa, no lesions  ARYTENOIDS:    normal position, normal size, normal mobility, no lesions, tower size normal  FALSE CORDS:     normal mucosa, no lesions, normal mobility  Bilateral  TRUE VOCAL FOLDS:      normal appearance, mobility, no lesions  Bilateral  GLOTTIS:     normal closure, with good cord apposition  SUBGLOTTIS:     unobstructed, patent, no lesions    Condition:  Stable.  Patient tolerated procedure well.    Complications:  None    Endoscopy Type: Flexible Tracheobronchoscopy    Indications for Procedure:   Tracheostomy evaluation  Trachea evaluation    Procedure Details:    The patient was placed in the sitting position.  The SCOPE TYPE: Flexible laryngoscope was passed both via the tracheostome and the tracheostomy tube .  The trachea from the subglottis to the rehana was examined.  A retrograde  examination of the Vocal cords and subglottis was carried out during respiration and phonation.  The following findings were noted:    Findings:   TRACHEO-BRONCHOSCOPY:    Stoma: Healing appropriately   Distal Trachea: Intact mucosa, no stenosis, rehana sharp, MSB patent   Proximal trachea: Intact mucosa, no narrowing   True Vocal cords: Mobile from below without penetration   Subglottis: Intact mucosa, no narrowing     Condition:  Stable.  Patient tolerated procedure well.    Complications:  None    Procedure:  Tracheostomy tube change    Procedure Details:    The patient's respiratory status was assessed.  The trach tube in position was assessed.  The patient was positioned.  The trach tube was removed without difficulty. The stoma and trachea were inspected.    No trach tube was placed and the patient completely decannulated.    Findings: See above    Condition:  Stable.  Patient tolerated procedure well.    Complications:  None    Post-procedure instructions reviewed with Patient, Daughter, RT      Electronically signed by Jef Velázquez Jr, MD, 09/05/23, 12:11 PM CDT.

## 2023-09-06 PROCEDURE — 97110 THERAPEUTIC EXERCISES: CPT

## 2023-09-06 PROCEDURE — 25010000002 ONDANSETRON PER 1 MG: Performed by: INTERNAL MEDICINE

## 2023-09-06 PROCEDURE — 99232 SBSQ HOSP IP/OBS MODERATE 35: CPT | Performed by: OTOLARYNGOLOGY

## 2023-09-06 PROCEDURE — 25010000002 HYDROMORPHONE PER 4 MG: Performed by: NURSE PRACTITIONER

## 2023-09-06 PROCEDURE — 97530 THERAPEUTIC ACTIVITIES: CPT

## 2023-09-06 RX ORDER — ALPRAZOLAM 0.25 MG/1
0.25 TABLET ORAL 2 TIMES DAILY PRN
Status: DISCONTINUED | OUTPATIENT
Start: 2023-09-06 | End: 2023-09-07

## 2023-09-06 RX ORDER — OLANZAPINE 5 MG/1
5 TABLET ORAL NIGHTLY PRN
Status: DISCONTINUED | OUTPATIENT
Start: 2023-09-06 | End: 2023-09-13 | Stop reason: HOSPADM

## 2023-09-06 RX ORDER — IPRATROPIUM BROMIDE AND ALBUTEROL SULFATE 2.5; .5 MG/3ML; MG/3ML
3 SOLUTION RESPIRATORY (INHALATION)
Status: DISCONTINUED | OUTPATIENT
Start: 2023-09-06 | End: 2023-09-07

## 2023-09-06 NOTE — PROGRESS NOTES
Adult Nutrition  Assessment/PES    Patient Name:  Emili Schmitt  YOB: 1960  MRN: [id number]  Admit Date:  8/21/2023    Assessment Date:  9/6/2023     Reason for Assessment       Row Name 09/06/23 1217          Reason for Assessment    Reason For Assessment follow-up protocol     Diagnosis infection/sepsis;pulmonary disease;cardiac disease;neurologic conditions     Identified At Risk by Screening Criteria difficulty chewing/swallowing                    Nutrition/Diet History       Row Name 09/06/23 1218          Nutrition/Diet History    Typical Intake (Food/Fluid/EN/PN) Good appetite continues. Unable to feed self but working on the proper adaptive equipment with OT. Wound vac continues to coccyx. Wt stable. Decannulated yesterday and stoma closing.                    Labs/Tests/Procedures/Meds       Row Name 09/06/23 1218          Labs/Procedures/Meds    Lab Results Reviewed reviewed        Diagnostic Tests/Procedures    Diagnostic Test/Procedure Reviewed reviewed        Medications    Pertinent Medications Reviewed reviewed                    Physical Findings       Row Name 09/06/23 1218          Physical Findings    Overall Physical Appearance Room air, BM 8/30, coccyx wound, heels with preventative dressing due to redness noted.                    Estimated/Assessed Needs - Anthropometrics       Row Name 09/06/23 1218          Anthropometrics    Weight for Calculation 87.9 kg (193 lb 12.8 oz)        Estimated/Assessed Needs    Additional Documentation Fluid Requirements (Group);Amador-St. Jeor Equation (Group);Protein Requirements (Group)        Amador-St. Jeor Equation    RMR (Amador-St. Jeor Equation) 1492.57     Amador-St. Jeor Activity Factors 1.2     Activity Factors (Amador-St. Jeor) 1791.084        Protein Requirements    Weight Used For Protein Calculations 63.5 kg (140 lb)  IBW     Est Protein Requirement Amount (gms/kg) 1.5 gm protein     Estimated Protein Requirements  (gms/day) 95.26        Fluid Requirements    Fluid Requirements (mL/day) 1791     RDA Method (mL) 1791                    Nutrition Prescription Ordered       Row Name 09/06/23 1219          Nutrition Prescription PO    Current PO Diet Soft Texture     Texture Chopped     Fluid Consistency Thin     Supplement Boost Glucose Control (Glucerna Shake)     Supplement Frequency 3 times a day                    Evaluation of Received Nutrient/Fluid Intake       Row Name 09/06/23 1219          Nutrient/Fluid Evaluation    Number of Days Evaluated 3 days        Fluid Intake Evaluation    Oral Fluid (mL) 2060        PO Evaluation    Number of Days PO Intake Evaluated 3 days     Number of Meals 9     % PO Intake 75                       Problem/Interventions:   Problem 1       Row Name 09/06/23 1219          Nutrition Diagnoses Problem 1    Problem 1 Increased Nutrient Needs     Inadequate Intake Type Oral     Macronutrient Kcal     Micronutrient Vitamin;Mineral     Etiology (related to) Medical Diagnosis     Skin Pressure injury;Skin breakdown;Non healing wound     Signs/Symptoms (evidenced by) PO Intake;SLP/Swallow eval     Percent (%) intake recorded 75 %     Over number of meals 9     Swallow eval status Done     Type of SLP Evaluation Bedside                          Intervention Goal       Row Name 09/06/23 1222          Intervention Goal    General Meet nutritional needs for age/condition;Disease management/therapy     PO Tolerate PO;Continue positive trend;Meet estimated needs     Weight No significant weight loss                    Nutrition Intervention       Row Name 09/06/23 1222          Nutrition Intervention    RD/Tech Action Care plan reviewd;Follow Tx progress;Supplement provided                    Nutrition Prescription       Row Name 09/06/23 1222          Nutrition Prescription PO    PO Prescription Other (comment)  continue same protocol                    Education/Evaluation       Row Name 09/06/23 1222           Education    Education No discharge needs identified at this time        Monitor/Evaluation    Monitor Per protocol                     Electronically signed by:  Inés Bonilla RDN, [place]  09/06/23 12:23 CDT

## 2023-09-06 NOTE — PROGRESS NOTES
[facility] Otolaryngology Head and Neck Surgery  INPATIENT PROGRESS NOTE    Patient Name: Emili Schmitt  : 1960   MRN: [id number]  Date of Admission: 2023  Today's Date: 23  Length of Stay: 0  [unfilled]   Montez Dan MD   Primary Care Physician: Remedios Qiu APRN  Surgical Procedures Since Admission:    Subjective   Subjective   Chief Complaint: Tracheostomy management  HPI   Accompanied by: RT, nursing staff  Since last examined, Emili Schmitt has remained stable since decannulation.  She has no complaints.  RT reports patient is almost closed her trach site.  Patient is seen, chart is reviewed    Review of Systems   No change from prior inquiry  All pertinent negatives and positives are as above. All other systems have been reviewed and are negative unless otherwise stated.   Objective   Objective   Vitals:        Physical Exam  Vitals reviewed.   Constitutional:       Appearance: Normal appearance. She is well-developed. She is obese.      Comments: Lying in bed, eating breakfast  No trach present, trach site bandaged  Obvious tremors   HENT:      Head: Normocephalic and atraumatic.      Comments: Lower face tremors     Right Ear: Hearing and external ear normal.      Left Ear: Hearing and external ear normal.      Nose: Nose normal.      Mouth/Throat:      Lips: Pink.      Mouth: Mucous membranes are dry.      Dentition: Normal dentition. No gum lesions.      Tongue: No lesions. Tongue does not deviate from midline.      Palate: No mass and lesions.      Pharynx: Oropharynx is clear. Uvula midline.   Eyes:      General: Lids are normal. Gaze aligned appropriately.      Extraocular Movements: Extraocular movements intact.      Conjunctiva/sclera: Conjunctivae normal.   Neck:      Comments: Tracheostomy tube type: None    Stoma: Healing appropriately    Secretions: Minimal    Voice: Fairly normal  Date placed: 2023  Date decannulated:  9/5/2023    Pulmonary:      Effort: Pulmonary effort is normal. No tachypnea, respiratory distress or retractions.      Breath sounds: No stridor.   Musculoskeletal:      Cervical back: No rigidity or crepitus. Decreased range of motion.   Lymphadenopathy:      Cervical: No cervical adenopathy.   Skin:     Findings: No rash.   Neurological:      General: No focal deficit present.      Mental Status: She is alert and oriented to person, place, and time.      Cranial Nerves: Cranial nerves 2-12 are intact. No cranial nerve deficit.      Comments: Entire body with tremors   Psychiatric:         Attention and Perception: Attention and perception normal.         Mood and Affect: Mood and affect normal.         Behavior: Behavior normal. Behavior is cooperative.         Thought Content: Thought content normal.         Cognition and Memory: Cognition normal.         Judgment: Judgment normal.         Result Review    Result Review:  I have personally reviewed the results from the time of this admission to 9/6/2023 08:53 CDT and agree with these findings:  []  Laboratory  []  Microbiology  []  Radiology  []  EKG/Telemetry   []  Cardiology/Vascular   []  Pathology  []  Old records  []  Other:  Most notable findings include: None pertinent to ENT this morning    Assessment & Plan   Assessment / Plan   Brief Patient Summary:  Emili Schmitt is a 62 y.o. female who has remained stable since tracheal decannulation.  I will continue [facility] site care.    Active Hospital Problems:   Active Hospital Problems    Diagnosis     Acute tracheostomy management     Acute on chronic respiratory failure with hypoxia and hypercapnia      Plan:   Tracheostomy management-the patient has done well since decannulation.  She is experiencing no difficulties breathing or swallowing.  I will continue [facility] site care.  Discussed Plan with patient, RT  Following patient as in-patient. Further recommendations will be made based on serial  examinations.    Medications/Orders for this encounter: No new medications ordered.  No New orders written.     Discharge Planning: Per primary team        DVT prophylaxis:  Medical DVT prophylaxis orders are present.     Electronically signed by Jef Velázquez Jr, MD, 09/06/23, 8:53 AM CDT.

## 2023-09-06 NOTE — PROGRESS NOTES
[facility] Fall Risk Assessment Note    Name: Emili Schmitt  MRN: [id number]  [facility]: [id number]  Admit Date/Time: 8/21/2023  6:54 PM.    Currently ordered medications associated with an increased risk for fall include:    Scheduled Meds:  amiodarone, 200 mg, Oral, Q24H  apixaban, 5 mg, Oral, Q12H  busPIRone, 5 mg, Oral, TID With Meals  DULoxetine, 60 mg, Oral, Daily  gabapentin, 200 mg, Oral, TID  metoprolol succinate XL, 50 mg, Oral, Q24H  Morphine, 2 mg, Intravenous, Once per day on Tue Fri  OLANZapine, 5 mg, Oral, Nightly  polyethylene glycol, 17 g, Oral, BID  senna-docusate sodium, 2 tablet, Oral, BID  simethicone, 80 mg, Oral, 4x Daily  traZODone, 150 mg, Oral, Nightly    PRN Meds:    ALPRAZolam    bisacodyl    hydrALAZINE    HYDROmorphone    ondansetron    oxyCODONE    lFaco Naranjo PharmD  09/06/23 10:14 CDT

## 2023-09-06 NOTE — PROGRESS NOTES
ANISH Carbajal APRN        Internal Medicine Progress Note    9/6/2023   10:02 CDT    Name:  Emili Schmitt  MRN:    [id number]     Acct:     [id number]   Room:  [address] [place] [address] Day: 0     Admit Date: 8/21/2023  6:54 PM  PCP: Remedios Qiu APRN    Subjective:     C/C: need for continued rehabilitation    Interval History: Status:  stayed the same. Resting in bed. No family at bedside. Afebrile. 02 sats stable with 02 at 2 lpm. Continues to do well s/p trach decannulation. Hopeful for transfer to [place] swing bed to continue wound care and therapy closer to home.     Review of Systems   Constitutional: Positive for malaise/fatigue. Negative for chills, decreased appetite, weight gain and weight loss.   HENT:  Negative for congestion, ear discharge, hoarse voice and tinnitus.    Eyes:  Negative for blurred vision, discharge, visual disturbance and visual halos.   Cardiovascular:  Positive for dyspnea on exertion. Negative for chest pain, claudication, irregular heartbeat, leg swelling, orthopnea and paroxysmal nocturnal dyspnea.   Respiratory:  Positive for cough and shortness of breath. Negative for sputum production and wheezing.    Endocrine: Negative for cold intolerance, heat intolerance and polyuria.   Hematologic/Lymphatic: Negative for adenopathy. Does not bruise/bleed easily.   Skin:  Positive for poor wound healing. Negative for dry skin, itching and suspicious lesions.   Musculoskeletal:  Negative for arthritis, back pain, falls, joint pain, muscle weakness and myalgias.   Gastrointestinal:  Negative for abdominal pain, constipation, diarrhea, dysphagia and hematemesis.   Genitourinary:  Negative for bladder incontinence, dysuria and frequency.   Neurological:  Positive for weakness. Negative for aphonia, disturbances in coordination and dizziness.   Psychiatric/Behavioral:  Negative for altered mental status, depression, memory loss and substance  abuse. The patient does not have insomnia and is not nervous/anxious.        Medications:     Allergies:   Allergies   Allergen Reactions    Hydrocodone Itching     Severe itching    Tylenol [Acetaminophen] Itching       Current Meds:   Current Facility-Administered Medications:     albuterol (PROVENTIL) nebulizer solution 0.083% 2.5 mg/3mL, 2.5 mg, Nebulization, Q6H PRN, Montez Dan MD    ALPRAZolam (XANAX) tablet 0.25 mg, 0.25 mg, Oral, BID PRN, Montez Dan MD    amiodarone (PACERONE) tablet 200 mg, 200 mg, Oral, Q24H, Montez Dan MD    apixaban (ELIQUIS) tablet 5 mg, 5 mg, Oral, Q12H, Montez Dan MD    bisacodyl (DULCOLAX) suppository 10 mg, 10 mg, Rectal, Daily PRN, Montez Dan MD    busPIRone (BUSPAR) tablet 5 mg, 5 mg, Oral, TID With Meals, Montez Dan MD    DULoxetine (CYMBALTA) DR capsule 60 mg, 60 mg, Oral, Daily, Montez Dan MD    First Mouthwash (Magic Mouthwash) 5 mL, 5 mL, Swish & Spit, TID PRN, Valencia Arias APRN    gabapentin (NEURONTIN) capsule 200 mg, 200 mg, Oral, TID, Montez Dan MD    hydrALAZINE (APRESOLINE) injection 5 mg, 5 mg, Intravenous, Q4H PRN, Montez Dan MD    HYDROmorphone (DILAUDID) injection 0.5 mg, 0.5 mg, Intravenous, Q12H PRN, Valencia Arias APRN    HYDROmorphone (DILAUDID) injection 1 mg, 1 mg, Intravenous, Once, Valencia Arias APRN    ipratropium-albuterol (DUO-NEB) nebulizer solution 3 mL, 3 mL, Nebulization, BID - RT, Montez Dan MD    lidocaine (LIDODERM) 5 % 2 patch, 2 patch, Transdermal, Q24H, Montez Dan MD    metoprolol succinate XL (TOPROL-XL) 24 hr tablet 50 mg, 50 mg, Oral, Q24H, Montez Dan MD    Morphine sulfate (PF) injection 2 mg, 2 mg, Intravenous, Once per day on Tue Fri, Montez Dan MD    OLANZapine (zyPREXA) tablet 5 mg, 5 mg, Oral, Nightly, Montez Dan MD    ondansetron (ZOFRAN) tablet 4 mg, 4 mg,  "Oral, Q6H PRN **OR** ondansetron (ZOFRAN) injection 4 mg, 4 mg, Intravenous, Q6H PRN, Montez Dan MD    oxyCODONE (ROXICODONE) immediate release tablet 7.5 mg, 7.5 mg, Oral, Q6H PRN, Valencia Arias R, APRN    pantoprazole (PROTONIX) EC tablet 40 mg, 40 mg, Oral, BID, Montez Dan MD    polyethylene glycol (MIRALAX) packet 17 g, 17 g, Oral, BID, Montez Dan MD    sennosides-docusate (PERICOLACE) 8.6-50 MG per tablet 2 tablet, 2 tablet, Oral, BID, Montez Dan MD    simethicone (MYLICON) chewable tablet 80 mg, 80 mg, Oral, 4x Daily, Montez Dan MD    sodium chloride nasal spray 2 spray, 2 spray, Each Nare, PRN, Marilee Botello APRN    sodium hypochlorite (HYSEPT) 0.25 % topical solution, , Topical, Q12H, Montez Dan MD    traZODone (DESYREL) tablet 150 mg, 150 mg, Oral, Nightly, Montez Dan MD    vitamin D3 tablet 5,000 Units, 5,000 Units, Oral, Daily, Montez Dan MD    Data:     Code Status:    There are no questions and answers to display.       Family History   Problem Relation Age of Onset    Breast cancer Neg Hx     Ovarian cancer Neg Hx     Uterine cancer Neg Hx     Colon cancer Neg Hx     Melanoma Neg Hx        Social History     Socioeconomic History    Marital status:    Tobacco Use    Smoking status: Every Day     Packs/day: 1.00     Types: Cigarettes    Smokeless tobacco: Never   Substance and Sexual Activity    Alcohol use: Never    Drug use: Never    Sexual activity: Yes     Partners: Male     Birth control/protection: Surgical       Vitals:  Ht 172.7 cm (68\")   Wt 87.9 kg (193 lb 12.8 oz)   BMI 29.47 kg/m²   T 98.5 P 65 R 18 /93 Sp02 93% (2 lpm)            I/O (24Hr):  No intake or output data in the 24 hours ending 09/06/23 1002    Labs and imaging:      No results found for this or any previous visit (from the past 12 hour(s)).                  Physical Examination:        Physical Exam  Vitals " and nursing note reviewed.   Constitutional:       Appearance: Normal appearance.   HENT:      Head: Normocephalic and atraumatic.      Right Ear: External ear normal.      Left Ear: External ear normal.      Nose: Nose normal.      Mouth/Throat:      Mouth: Mucous membranes are moist.      Pharynx: Oropharynx is clear.   Eyes:      Extraocular Movements: Extraocular movements intact.      Conjunctiva/sclera: Conjunctivae normal.      Pupils: Pupils are equal, round, and reactive to light.   Neck:      Comments: Dressing c.d.I.  Cardiovascular:      Rate and Rhythm: Normal rate and regular rhythm.      Pulses: Normal pulses.      Heart sounds: Normal heart sounds.   Pulmonary:      Effort: Pulmonary effort is normal.      Breath sounds: Normal breath sounds.   Abdominal:      General: Bowel sounds are normal.      Palpations: Abdomen is soft.   Musculoskeletal:      Comments: Generalized weakness   Skin:     Comments: Wound vac intact   Neurological:      Mental Status: She is alert and oriented to person, place, and time.      Comments: Intention tremor - worse to LUE   Psychiatric:         Mood and Affect: Mood normal.         Behavior: Behavior normal.         Assessment:             Acute tracheostomy management    Acute on chronic respiratory failure with hypoxia and hypercapnia    Past Medical History:   Diagnosis Date    Arthritis     Interstitial cystitis     Macular dystrophy     Neuropathy     Restless leg syndrome         Plan:        Acute hypoxic respiratory failure s/p tracheostomy tube  PAF  Acute RUE DVT  Chronic anticoagulation  Multifactorial anemia  Peripheral neuropathy  DM2 with hyperglycemia not on long term insulin  HTN  Anxiety  GERD  Constipation  Unstageable decubitus ulcer to coccyx  Continue current treatment. Monitor counts. Increase activity. Labs in am. Oxygen weaning as tolerated.  Wound care per wound care team. Aggressive therapies as tolerated. Maintain patient safety.  Continue  pain management efforts.      Electronically signed by BHAVESH Mills on 9/6/2023 at 10:02 CDT     I have discussed the care of Emili Schmitt, including pertinent history and exam findings, with the nurse practitioner.    I have seen and examined the patient and the key elements of all parts of the encounter have been performed by me.  I agree with the assessment, plan and orders as documented by BHAVESH Sheehan, after I modified the exam findings and the plan of treatments and the final version is my approved version of the assessment.        Electronically signed by Montez Dan MD on 9/6/2023 at 11:00 CDT   - - -

## 2023-09-07 LAB
ANION GAP SERPL CALCULATED.3IONS-SCNC: 11 MMOL/L (ref 5–15)
BASOPHILS # BLD AUTO: 0.06 10*3/MM3 (ref 0–0.2)
BASOPHILS NFR BLD AUTO: 0.9 % (ref 0–1.5)
BUN SERPL-MCNC: 9 MG/DL (ref 8–23)
BUN/CREAT SERPL: 26.5 (ref 7–25)
CALCIUM SPEC-SCNC: 9.8 MG/DL (ref 8.6–10.5)
CHLORIDE SERPL-SCNC: 101 MMOL/L (ref 98–107)
CO2 SERPL-SCNC: 28 MMOL/L (ref 22–29)
CREAT SERPL-MCNC: 0.34 MG/DL (ref 0.57–1)
CRP SERPL-MCNC: 1.75 MG/DL (ref 0–0.5)
DEPRECATED RDW RBC AUTO: 53.5 FL (ref 37–54)
EGFRCR SERPLBLD CKD-EPI 2021: 117.3 ML/MIN/1.73
EOSINOPHIL # BLD AUTO: 0.33 10*3/MM3 (ref 0–0.4)
EOSINOPHIL NFR BLD AUTO: 4.9 % (ref 0.3–6.2)
ERYTHROCYTE [DISTWIDTH] IN BLOOD BY AUTOMATED COUNT: 15.1 % (ref 12.3–15.4)
ERYTHROCYTE [SEDIMENTATION RATE] IN BLOOD: 81 MM/HR (ref 0–30)
GEN 5 2HR TROPONIN T REFLEX: 29 NG/L
GLUCOSE SERPL-MCNC: 140 MG/DL (ref 65–99)
HCT VFR BLD AUTO: 32.1 % (ref 34–46.6)
HGB BLD-MCNC: 9.7 G/DL (ref 12–15.9)
IMM GRANULOCYTES # BLD AUTO: 0.05 10*3/MM3 (ref 0–0.05)
IMM GRANULOCYTES NFR BLD AUTO: 0.7 % (ref 0–0.5)
LYMPHOCYTES # BLD AUTO: 1.3 10*3/MM3 (ref 0.7–3.1)
LYMPHOCYTES NFR BLD AUTO: 19.2 % (ref 19.6–45.3)
MCH RBC QN AUTO: 29.3 PG (ref 26.6–33)
MCHC RBC AUTO-ENTMCNC: 30.2 G/DL (ref 31.5–35.7)
MCV RBC AUTO: 97 FL (ref 79–97)
MONOCYTES # BLD AUTO: 0.52 10*3/MM3 (ref 0.1–0.9)
MONOCYTES NFR BLD AUTO: 7.7 % (ref 5–12)
NEUTROPHILS NFR BLD AUTO: 4.5 10*3/MM3 (ref 1.7–7)
NEUTROPHILS NFR BLD AUTO: 66.6 % (ref 42.7–76)
NRBC BLD AUTO-RTO: 0 /100 WBC (ref 0–0.2)
PLATELET # BLD AUTO: 409 10*3/MM3 (ref 140–450)
PMV BLD AUTO: 10.2 FL (ref 6–12)
POTASSIUM SERPL-SCNC: 4 MMOL/L (ref 3.5–5.2)
RBC # BLD AUTO: 3.31 10*6/MM3 (ref 3.77–5.28)
SODIUM SERPL-SCNC: 140 MMOL/L (ref 136–145)
TROPONIN T DELTA: 0 NG/L
TROPONIN T SERPL HS-MCNC: 29 NG/L
WBC NRBC COR # BLD: 6.76 10*3/MM3 (ref 3.4–10.8)

## 2023-09-07 PROCEDURE — 25010000002 MORPHINE PER 10 MG: Performed by: NURSE PRACTITIONER

## 2023-09-07 PROCEDURE — 97530 THERAPEUTIC ACTIVITIES: CPT

## 2023-09-07 PROCEDURE — 86140 C-REACTIVE PROTEIN: CPT | Performed by: NURSE PRACTITIONER

## 2023-09-07 PROCEDURE — 80048 BASIC METABOLIC PNL TOTAL CA: CPT | Performed by: NURSE PRACTITIONER

## 2023-09-07 PROCEDURE — 25010000002 HYDROMORPHONE PER 4 MG: Performed by: NURSE PRACTITIONER

## 2023-09-07 PROCEDURE — 97164 PT RE-EVAL EST PLAN CARE: CPT | Performed by: PHYSICAL THERAPIST

## 2023-09-07 PROCEDURE — 25010000002 ONDANSETRON PER 1 MG: Performed by: INTERNAL MEDICINE

## 2023-09-07 PROCEDURE — 85652 RBC SED RATE AUTOMATED: CPT | Performed by: NURSE PRACTITIONER

## 2023-09-07 PROCEDURE — 84484 ASSAY OF TROPONIN QUANT: CPT | Performed by: INTERNAL MEDICINE

## 2023-09-07 PROCEDURE — 85025 COMPLETE CBC W/AUTO DIFF WBC: CPT | Performed by: NURSE PRACTITIONER

## 2023-09-07 RX ORDER — MORPHINE SULFATE 2 MG/ML
2 INJECTION, SOLUTION INTRAMUSCULAR; INTRAVENOUS
Status: DISPENSED | OUTPATIENT
Start: 2023-09-07 | End: 2023-09-08

## 2023-09-07 RX ORDER — ALPRAZOLAM 0.25 MG/1
0.25 TABLET ORAL 2 TIMES DAILY PRN
Status: DISCONTINUED | OUTPATIENT
Start: 2023-09-07 | End: 2023-09-13 | Stop reason: HOSPADM

## 2023-09-07 RX ORDER — IPRATROPIUM BROMIDE AND ALBUTEROL SULFATE 2.5; .5 MG/3ML; MG/3ML
3 SOLUTION RESPIRATORY (INHALATION) EVERY 6 HOURS PRN
Status: DISCONTINUED | OUTPATIENT
Start: 2023-09-07 | End: 2023-09-13 | Stop reason: HOSPADM

## 2023-09-07 NOTE — PROGRESS NOTES
ANISH Carbajal APRN        Internal Medicine Progress Note    9/7/2023   13:05 CDT    Name:  Emili Schmitt  MRN:    [id number]     Acct:     [id number]   Room:  [address] [place] [address] Day: 0     Admit Date: 8/21/2023  6:54 PM  PCP: Remedios Qiu APRN    Subjective:     C/C: need for continued rehabilitation    Interval History: Status:  stayed the same. Resting in bed. Daughter at bedside. Afebrile. 02 sats stable with 02 at 2 lpm. Continues to do well s/p trach decannulation. Had episode of acute onset midsternal chest pain earlier today. Was treated with morphine and mylicon and pain subsided quickly. Patient believes pain related to indigestion. Counts stable. Anxious for transfer to swing bed.    Review of Systems   Constitutional: Positive for malaise/fatigue. Negative for chills, decreased appetite, weight gain and weight loss.   HENT:  Negative for congestion, ear discharge, hoarse voice and tinnitus.    Eyes:  Negative for blurred vision, discharge, visual disturbance and visual halos.   Cardiovascular:  Positive for dyspnea on exertion. Negative for chest pain, claudication, irregular heartbeat, leg swelling, orthopnea and paroxysmal nocturnal dyspnea.   Respiratory:  Positive for cough and shortness of breath. Negative for sputum production and wheezing.    Endocrine: Negative for cold intolerance, heat intolerance and polyuria.   Hematologic/Lymphatic: Negative for adenopathy. Does not bruise/bleed easily.   Skin:  Positive for poor wound healing. Negative for dry skin, itching and suspicious lesions.   Musculoskeletal:  Negative for arthritis, back pain, falls, joint pain, muscle weakness and myalgias.   Gastrointestinal:  Negative for abdominal pain, constipation, diarrhea, dysphagia and hematemesis.   Genitourinary:  Negative for bladder incontinence, dysuria and frequency.   Neurological:  Positive for weakness. Negative for aphonia, disturbances in  coordination and dizziness.   Psychiatric/Behavioral:  Negative for altered mental status, depression, memory loss and substance abuse. The patient does not have insomnia and is not nervous/anxious.        Medications:     Allergies:   Allergies   Allergen Reactions    Hydrocodone Itching     Severe itching    Tylenol [Acetaminophen] Itching       Current Meds:   Current Facility-Administered Medications:     albuterol (PROVENTIL) nebulizer solution 0.083% 2.5 mg/3mL, 2.5 mg, Nebulization, Q6H PRN, Montez Dan MD    ALPRAZolam (XANAX) tablet 0.25 mg, 0.25 mg, Oral, BID PRN, Montez Dan MD    amiodarone (PACERONE) tablet 200 mg, 200 mg, Oral, Q24H, Montez Dan MD    apixaban (ELIQUIS) tablet 5 mg, 5 mg, Oral, Q12H, Montez Dan MD    bisacodyl (DULCOLAX) suppository 10 mg, 10 mg, Rectal, Daily PRN, Montez Dan MD    busPIRone (BUSPAR) tablet 5 mg, 5 mg, Oral, TID With Meals, Montez Dan MD    DULoxetine (CYMBALTA) DR capsule 60 mg, 60 mg, Oral, Daily, Montez Dan MD    First Mouthwash (Magic Mouthwash) 5 mL, 5 mL, Swish & Spit, TID PRN, Valencia Arias, APRVINNIE    gabapentin (NEURONTIN) capsule 200 mg, 200 mg, Oral, TID, Montez Dan MD    hydrALAZINE (APRESOLINE) injection 5 mg, 5 mg, Intravenous, Q4H PRN, Montez Dan MD    HYDROmorphone (DILAUDID) injection 0.5 mg, 0.5 mg, Intravenous, Q12H PRN, Valencia Arias APRVINNIE    ipratropium-albuterol (DUO-NEB) nebulizer solution 3 mL, 3 mL, Nebulization, Q6H PRN, Montez Dan MD    lidocaine (LIDODERM) 5 % 2 patch, 2 patch, Transdermal, Q24H, Montez Dan MD    metoprolol succinate XL (TOPROL-XL) 24 hr tablet 50 mg, 50 mg, Oral, Q24H, Montez Dan MD    Morphine sulfate (PF) injection 2 mg, 2 mg, Intravenous, Once per day on Tue Sy Mccarty Richard Scott, MD    Morphine sulfate (PF) injection 2 mg, 2 mg, Intravenous, Once, Marilee Botello,  "APRN    OLANZapine (zyPREXA) tablet 5 mg, 5 mg, Oral, Nightly PRN, Montez Dan MD    ondansetron (ZOFRAN) tablet 4 mg, 4 mg, Oral, Q6H PRN **OR** ondansetron (ZOFRAN) injection 4 mg, 4 mg, Intravenous, Q6H PRN, Montez Dan MD    oxyCODONE (ROXICODONE) immediate release tablet 7.5 mg, 7.5 mg, Oral, Q6H PRN, Valencia Arias R, APRN    pantoprazole (PROTONIX) EC tablet 40 mg, 40 mg, Oral, BID, Montez Dan MD    polyethylene glycol (MIRALAX) packet 17 g, 17 g, Oral, BID, Montez Dan MD    sennosides-docusate (PERICOLACE) 8.6-50 MG per tablet 2 tablet, 2 tablet, Oral, BID, Montez Dan MD    simethicone (MYLICON) chewable tablet 80 mg, 80 mg, Oral, 4x Daily, Montez Dan MD    sodium chloride nasal spray 2 spray, 2 spray, Each Nare, PRN, Marilee Botello, BHAVESH    sodium hypochlorite (HYSEPT) 0.25 % topical solution, , Topical, Q12H, Montez Dan MD    traZODone (DESYREL) tablet 150 mg, 150 mg, Oral, Nightly, Montez Dan MD    vitamin D3 tablet 5,000 Units, 5,000 Units, Oral, Daily, Montez Dan MD    Data:     Code Status:    There are no questions and answers to display.       Family History   Problem Relation Age of Onset    Breast cancer Neg Hx     Ovarian cancer Neg Hx     Uterine cancer Neg Hx     Colon cancer Neg Hx     Melanoma Neg Hx        Social History     Socioeconomic History    Marital status:    Tobacco Use    Smoking status: Every Day     Packs/day: 1.00     Types: Cigarettes    Smokeless tobacco: Never   Substance and Sexual Activity    Alcohol use: Never    Drug use: Never    Sexual activity: Yes     Partners: Male     Birth control/protection: Surgical       Vitals:  Ht 172.7 cm (68\")   Wt 87.9 kg (193 lb 12.8 oz)   BMI 29.47 kg/m²   T 98.4 P 60 R 14 BP 81/42 Sp02 96% (2 lpm)            I/O (24Hr):  No intake or output data in the 24 hours ending 09/07/23 1305    Labs and imaging:      Recent " Results (from the past 12 hour(s))   Basic Metabolic Panel    Collection Time: 09/07/23  5:43 AM    Specimen: Blood   Result Value Ref Range    Glucose 140 (H) 65 - 99 mg/dL    BUN 9 8 - 23 mg/dL    Creatinine 0.34 (L) 0.57 - 1.00 mg/dL    Sodium 140 136 - 145 mmol/L    Potassium 4.0 3.5 - 5.2 mmol/L    Chloride 101 98 - 107 mmol/L    CO2 28.0 22.0 - 29.0 mmol/L    Calcium 9.8 8.6 - 10.5 mg/dL    BUN/Creatinine Ratio 26.5 (H) 7.0 - 25.0    Anion Gap 11.0 5.0 - 15.0 mmol/L    eGFR 117.3 >60.0 mL/min/1.73   Sedimentation Rate    Collection Time: 09/07/23  5:43 AM    Specimen: Blood   Result Value Ref Range    Sed Rate 81 (H) 0 - 30 mm/hr   C-reactive Protein    Collection Time: 09/07/23  5:43 AM    Specimen: Blood   Result Value Ref Range    C-Reactive Protein 1.75 (H) 0.00 - 0.50 mg/dL   CBC Auto Differential    Collection Time: 09/07/23  5:43 AM    Specimen: Blood   Result Value Ref Range    WBC 6.76 3.40 - 10.80 10*3/mm3    RBC 3.31 (L) 3.77 - 5.28 10*6/mm3    Hemoglobin 9.7 (L) 12.0 - 15.9 g/dL    Hematocrit 32.1 (L) 34.0 - 46.6 %    MCV 97.0 79.0 - 97.0 fL    MCH 29.3 26.6 - 33.0 pg    MCHC 30.2 (L) 31.5 - 35.7 g/dL    RDW 15.1 12.3 - 15.4 %    RDW-SD 53.5 37.0 - 54.0 fl    MPV 10.2 6.0 - 12.0 fL    Platelets 409 140 - 450 10*3/mm3    Neutrophil % 66.6 42.7 - 76.0 %    Lymphocyte % 19.2 (L) 19.6 - 45.3 %    Monocyte % 7.7 5.0 - 12.0 %    Eosinophil % 4.9 0.3 - 6.2 %    Basophil % 0.9 0.0 - 1.5 %    Immature Grans % 0.7 (H) 0.0 - 0.5 %    Neutrophils, Absolute 4.50 1.70 - 7.00 10*3/mm3    Lymphocytes, Absolute 1.30 0.70 - 3.10 10*3/mm3    Monocytes, Absolute 0.52 0.10 - 0.90 10*3/mm3    Eosinophils, Absolute 0.33 0.00 - 0.40 10*3/mm3    Basophils, Absolute 0.06 0.00 - 0.20 10*3/mm3    Immature Grans, Absolute 0.05 0.00 - 0.05 10*3/mm3    nRBC 0.0 0.0 - 0.2 /100 WBC                     Physical Examination:        Physical Exam  Vitals and nursing note reviewed.   Constitutional:       Appearance: Normal  appearance.   HENT:      Head: Normocephalic and atraumatic.      Right Ear: External ear normal.      Left Ear: External ear normal.      Nose: Nose normal.      Mouth/Throat:      Mouth: Mucous membranes are moist.      Pharynx: Oropharynx is clear.   Eyes:      Extraocular Movements: Extraocular movements intact.      Conjunctiva/sclera: Conjunctivae normal.      Pupils: Pupils are equal, round, and reactive to light.   Neck:      Comments: Dressing c.d.I.  Cardiovascular:      Rate and Rhythm: Normal rate and regular rhythm.      Pulses: Normal pulses.      Heart sounds: Normal heart sounds.   Pulmonary:      Effort: Pulmonary effort is normal.      Breath sounds: Normal breath sounds.   Abdominal:      General: Bowel sounds are normal.      Palpations: Abdomen is soft.   Musculoskeletal:      Comments: Generalized weakness   Skin:     Comments: Wound vac intact   Neurological:      Mental Status: She is alert and oriented to person, place, and time.      Comments: Intention tremor - worse to LUE   Psychiatric:         Mood and Affect: Mood normal.         Behavior: Behavior normal.         Assessment:             Acute tracheostomy management    Acute on chronic respiratory failure with hypoxia and hypercapnia    Past Medical History:   Diagnosis Date    Arthritis     Interstitial cystitis     Macular dystrophy     Neuropathy     Restless leg syndrome         Plan:        Acute hypoxic respiratory failure s/p tracheostomy tube  PAF  Acute RUE DVT  Chronic anticoagulation  Multifactorial anemia  Peripheral neuropathy  DM2 with hyperglycemia not on long term insulin  HTN  Anxiety  GERD  Constipation  Unstageable decubitus ulcer to coccyx  Continue current treatment. Monitor counts. Increase activity. Labs Monday. Oxygen weaning as tolerated.  Wound care per wound care team. Aggressive therapies as tolerated. Maintain patient safety.  Continue pain management efforts. Monitor for recurrence of chest pain and await  troponin.       Electronically signed by BHAVESH Mills on 9/7/2023 at 13:05 CDT

## 2023-09-08 ENCOUNTER — APPOINTMENT (OUTPATIENT)
Dept: CARDIOLOGY | Facility: HOSPITAL | Age: 63
End: 2023-09-08
Payer: MEDICAID

## 2023-09-08 LAB — TROPONIN T SERPL HS-MCNC: 29 NG/L

## 2023-09-08 PROCEDURE — 93356 MYOCRD STRAIN IMG SPCKL TRCK: CPT

## 2023-09-08 PROCEDURE — 97530 THERAPEUTIC ACTIVITIES: CPT

## 2023-09-08 PROCEDURE — 93325 DOPPLER ECHO COLOR FLOW MAPG: CPT | Performed by: INTERNAL MEDICINE

## 2023-09-08 PROCEDURE — 93356 MYOCRD STRAIN IMG SPCKL TRCK: CPT | Performed by: INTERNAL MEDICINE

## 2023-09-08 PROCEDURE — 25010000002 HYDROMORPHONE PER 4 MG: Performed by: NURSE PRACTITIONER

## 2023-09-08 PROCEDURE — 93308 TTE F-UP OR LMTD: CPT

## 2023-09-08 PROCEDURE — 84484 ASSAY OF TROPONIN QUANT: CPT | Performed by: INTERNAL MEDICINE

## 2023-09-08 PROCEDURE — 93321 DOPPLER ECHO F-UP/LMTD STD: CPT

## 2023-09-08 PROCEDURE — 93321 DOPPLER ECHO F-UP/LMTD STD: CPT | Performed by: INTERNAL MEDICINE

## 2023-09-08 PROCEDURE — 93325 DOPPLER ECHO COLOR FLOW MAPG: CPT

## 2023-09-08 PROCEDURE — 93308 TTE F-UP OR LMTD: CPT | Performed by: INTERNAL MEDICINE

## 2023-09-08 NOTE — PROGRESS NOTES
ANISH Carbajal APRN        Internal Medicine Progress Note    9/8/2023   09:49 CDT    Name:  Emili Schmitt  MRN:    [id number]     Acct:     [id number]   Room:  [address] [place] [address] Day: 0     Admit Date: 8/21/2023  6:54 PM  PCP: Remedios Qiu APRN    Subjective:     C/C: need for continued rehabilitation    Interval History: Status:  stayed the same. Resting in bed. Daughter and  at bedside. Afebrile. 02 sats stable with 02 at 2 lpm prn. Continues to do well s/p trach decannulation. No further episodes of chest pain. Family concerned about why she's not progressing with therapy. Patient admits that she often refuses or stops therapy due to pain and anxiety.     Review of Systems   Constitutional: Positive for malaise/fatigue. Negative for chills, decreased appetite, weight gain and weight loss.   HENT:  Negative for congestion, ear discharge, hoarse voice and tinnitus.    Eyes:  Negative for blurred vision, discharge, visual disturbance and visual halos.   Cardiovascular:  Positive for dyspnea on exertion. Negative for chest pain, claudication, irregular heartbeat, leg swelling, orthopnea and paroxysmal nocturnal dyspnea.   Respiratory:  Positive for cough and shortness of breath. Negative for sputum production and wheezing.    Endocrine: Negative for cold intolerance, heat intolerance and polyuria.   Hematologic/Lymphatic: Negative for adenopathy. Does not bruise/bleed easily.   Skin:  Positive for poor wound healing. Negative for dry skin, itching and suspicious lesions.   Musculoskeletal:  Negative for arthritis, back pain, falls, joint pain, muscle weakness and myalgias.   Gastrointestinal:  Negative for abdominal pain, constipation, diarrhea, dysphagia and hematemesis.   Genitourinary:  Negative for bladder incontinence, dysuria and frequency.   Neurological:  Positive for weakness. Negative for aphonia, disturbances in coordination and dizziness.    Psychiatric/Behavioral:  Negative for altered mental status, depression, memory loss and substance abuse. The patient does not have insomnia and is not nervous/anxious.        Medications:     Allergies:   Allergies   Allergen Reactions    Hydrocodone Itching     Severe itching    Tylenol [Acetaminophen] Itching       Current Meds:   Current Facility-Administered Medications:     albuterol (PROVENTIL) nebulizer solution 0.083% 2.5 mg/3mL, 2.5 mg, Nebulization, Q6H PRN, Montez Dan MD    ALPRAZolam (XANAX) tablet 0.25 mg, 0.25 mg, Oral, BID PRN, Montez Dan MD    amiodarone (PACERONE) tablet 200 mg, 200 mg, Oral, Q24H, Montez Dan MD    apixaban (ELIQUIS) tablet 5 mg, 5 mg, Oral, Q12H, Montez Dan MD    bisacodyl (DULCOLAX) suppository 10 mg, 10 mg, Rectal, Daily PRN, Montez Dan MD    busPIRone (BUSPAR) tablet 5 mg, 5 mg, Oral, TID With Meals, Montez Dan MD    DULoxetine (CYMBALTA) DR capsule 60 mg, 60 mg, Oral, Daily, Montez Dan MD    First Mouthwash (Magic Mouthwash) 5 mL, 5 mL, Swish & Spit, TID PRN, Valencia Arias, BHAVESH    gabapentin (NEURONTIN) capsule 200 mg, 200 mg, Oral, TID, Montez Dan MD    hydrALAZINE (APRESOLINE) injection 5 mg, 5 mg, Intravenous, Q4H PRN, Montez Dan MD    HYDROmorphone (DILAUDID) injection 0.5 mg, 0.5 mg, Intravenous, Q12H PRN, Valencia Arias APRVINNIE    ipratropium-albuterol (DUO-NEB) nebulizer solution 3 mL, 3 mL, Nebulization, Q6H PRN, Montez Dan MD    lidocaine (LIDODERM) 5 % 2 patch, 2 patch, Transdermal, Q24H, Montez Dan MD    metoprolol succinate XL (TOPROL-XL) 24 hr tablet 50 mg, 50 mg, Oral, Q24H, Montez Dan MD    Morphine sulfate (PF) injection 2 mg, 2 mg, Intravenous, Once per day on Tue Fri, Montez Dan MD    OLANZapine (zyPREXA) tablet 5 mg, 5 mg, Oral, Nightly PRN, Montez Dan MD    ondansetron (ZOFRAN) tablet 4  "mg, 4 mg, Oral, Q6H PRN **OR** ondansetron (ZOFRAN) injection 4 mg, 4 mg, Intravenous, Q6H PRN, Montez Dan MD    oxyCODONE (ROXICODONE) immediate release tablet 7.5 mg, 7.5 mg, Oral, Q6H PRN, Valencia Arias R, APRN    pantoprazole (PROTONIX) EC tablet 40 mg, 40 mg, Oral, BID, Montez Dan MD    polyethylene glycol (MIRALAX) packet 17 g, 17 g, Oral, BID, Montez Dan MD    sennosides-docusate (PERICOLACE) 8.6-50 MG per tablet 2 tablet, 2 tablet, Oral, BID, Montez Dan MD    simethicone (MYLICON) chewable tablet 80 mg, 80 mg, Oral, 4x Daily, Montez Dan MD    sodium chloride nasal spray 2 spray, 2 spray, Each Nare, PRN, Marilee Botello APRN    sodium hypochlorite (HYSEPT) 0.25 % topical solution, , Topical, Q12H, Montez Dan MD    traZODone (DESYREL) tablet 150 mg, 150 mg, Oral, Nightly, Montez Dan MD    vitamin D3 tablet 5,000 Units, 5,000 Units, Oral, Daily, Montez Dan MD    Data:     Code Status:    There are no questions and answers to display.       Family History   Problem Relation Age of Onset    Breast cancer Neg Hx     Ovarian cancer Neg Hx     Uterine cancer Neg Hx     Colon cancer Neg Hx     Melanoma Neg Hx        Social History     Socioeconomic History    Marital status:    Tobacco Use    Smoking status: Every Day     Packs/day: 1.00     Types: Cigarettes    Smokeless tobacco: Never   Substance and Sexual Activity    Alcohol use: Never    Drug use: Never    Sexual activity: Yes     Partners: Male     Birth control/protection: Surgical       Vitals:  Ht 172.7 cm (68\")   Wt 87.9 kg (193 lb 12.8 oz)   BMI 29.47 kg/m²   T 979 P 78 R 20 /58 Sp02 96% (2 lpm)            I/O (24Hr):  No intake or output data in the 24 hours ending 09/08/23 0949    Labs and imaging:      No results found for this or any previous visit (from the past 12 hour(s)).                    Physical Examination:        Physical " Exam  Vitals and nursing note reviewed.   Constitutional:       Appearance: Normal appearance.   HENT:      Head: Normocephalic and atraumatic.      Right Ear: External ear normal.      Left Ear: External ear normal.      Nose: Nose normal.      Mouth/Throat:      Mouth: Mucous membranes are moist.      Pharynx: Oropharynx is clear.   Eyes:      Extraocular Movements: Extraocular movements intact.      Conjunctiva/sclera: Conjunctivae normal.      Pupils: Pupils are equal, round, and reactive to light.   Neck:      Comments: Dressing c.d.I.  Cardiovascular:      Rate and Rhythm: Normal rate and regular rhythm.      Pulses: Normal pulses.      Heart sounds: Normal heart sounds.   Pulmonary:      Effort: Pulmonary effort is normal.      Breath sounds: Normal breath sounds.   Abdominal:      General: Bowel sounds are normal.      Palpations: Abdomen is soft.   Musculoskeletal:      Comments: Generalized weakness   Skin:     Comments: Wound vac intact   Neurological:      Mental Status: She is alert and oriented to person, place, and time.      Comments: Intention tremor - worse to LUE   Psychiatric:         Mood and Affect: Mood normal.         Behavior: Behavior normal.         Assessment:             Acute tracheostomy management    Acute on chronic respiratory failure with hypoxia and hypercapnia    Past Medical History:   Diagnosis Date    Arthritis     Interstitial cystitis     Macular dystrophy     Neuropathy     Restless leg syndrome         Plan:        Acute hypoxic respiratory failure s/p tracheostomy tube  PAF  Acute RUE DVT  Chronic anticoagulation  Multifactorial anemia  Peripheral neuropathy  DM2 with hyperglycemia not on long term insulin  HTN  Anxiety  GERD  Constipation  Unstageable decubitus ulcer to coccyx  Continue current treatment. Monitor counts. Increase activity. Labs Monday. Oxygen weaning as tolerated.  Wound care per wound care team. Aggressive therapies as tolerated. Maintain patient  safety.  Continue pain management efforts. Monitor for recurrence of chest pain and await troponin. Encourage increased participation with therapies.       Electronically signed by BHAVESH Mills on 9/8/2023 at 09:49 CDT

## 2023-09-08 NOTE — CONSULTS
LOS: 0 days   Patient Care Team:  Remedios Qiu APRN as PCP - General (Family Medicine)    Chief Complaint: Transferred for rehabilitation     Subjective    Emili Schmitt is a 62 y.o. female who is being seen in consultation  Patient has had substernal chest pain which happen only when she was anxious  Currently denies any chest pain  She feels she gets extremely nervous and then starts having substernal chest pain  Chest pain is nonpositional nonpleuritic  Chest pain is not gustatory  No orthopnea  No paroxysmal nocturnal dyspnea  Underwent TAVR procedure in [facility] which was complicated by prolonged.  Of recovery  Was sent to [place]  Is now in [facility].  Tracheostomy care is being managed  Patient has become weak and physically debilitated  Daughter by bedside    Not on cardiac monitor    Review of Systems   Constitutional: No chills   Has fatigue   No fever.   HENT: Negative.    Eyes: Negative.    Respiratory: Negative for cough,   No chest wall soreness,   Shortness of breath,   no wheezing, no stridor.    Cardiovascular: As above  Gastrointestinal: Negative for abdominal distention,  No abdominal pain,   No blood in stool,   No constipation,   No diarrhea,   No nausea   No vomiting.   Endocrine: Negative.    Genitourinary: Negative for difficulty urinating, dysuria, flank pain and hematuria.   Musculoskeletal: Negative.    Skin: Negative for rash and wound.   Allergic/Immunologic: Negative.    Neurological: Negative for dizziness, syncope, weakness,   No light-headedness  No  headaches.   Hematological: Does not bruise/bleed easily.   Psychiatric/Behavioral: Negative for agitation or behavioral problems,   No confusion,   the patient is  nervous/anxious.       History:   Past Medical History:   Diagnosis Date    Arthritis     Interstitial cystitis     Macular dystrophy     Neuropathy     Restless leg syndrome      Past Surgical History:   Procedure Laterality Date     APPENDECTOMY      BREAST CYST ASPIRATION Left     HERNIA REPAIR      HYSTERECTOMY      [facility] unsure if she has tubes due to hemmorage after birth    OTHER SURGICAL HISTORY      TIF surgery     Social History     Socioeconomic History    Marital status:    Tobacco Use    Smoking status: Every Day     Packs/day: 1.00     Types: Cigarettes    Smokeless tobacco: Never   Substance and Sexual Activity    Alcohol use: Never    Drug use: Never    Sexual activity: Yes     Partners: Male     Birth control/protection: Surgical     Family History   Problem Relation Age of Onset    Breast cancer Neg Hx     Ovarian cancer Neg Hx     Uterine cancer Neg Hx     Colon cancer Neg Hx     Melanoma Neg Hx        Labs:  WBC WBC   Date Value Ref Range Status   09/07/2023 6.76 3.40 - 10.80 10*3/mm3 Final      HGB Hemoglobin   Date Value Ref Range Status   09/07/2023 9.7 (L) 12.0 - 15.9 g/dL Final      HCT Hematocrit   Date Value Ref Range Status   09/07/2023 32.1 (L) 34.0 - 46.6 % Final      Platelets Platelets   Date Value Ref Range Status   09/07/2023 409 140 - 450 10*3/mm3 Final      MCV MCV   Date Value Ref Range Status   09/07/2023 97.0 79.0 - 97.0 fL Final        Results from last 7 days   Lab Units 09/07/23  0543 09/05/23  0529   SODIUM mmol/L 140 140   POTASSIUM mmol/L 4.0 4.3   CHLORIDE mmol/L 101 101   CO2 mmol/L 28.0 30.0*   BUN mg/dL 9 8   CREATININE mg/dL 0.34* 0.28*   CALCIUM mg/dL 9.8 10.1   BILIRUBIN mg/dL  --  0.2   ALK PHOS U/L  --  78   ALT (SGPT) U/L  --  23   AST (SGOT) U/L  --  13   GLUCOSE mg/dL 140* 123*     Lab Results   Component Value Date    TROPONINT 29 (H) 09/08/2023     PT/INR:  No results found for: PROTIME/No results found for: INR    Imaging Results (Last 72 Hours)       ** No results found for the last 72 hours. **            Objective     Allergies   Allergen Reactions    Hydrocodone Itching     Severe itching    Tylenol [Acetaminophen] Itching       Medication Review: Performed  Current  Facility-Administered Medications   Medication Dose Route Frequency Provider Last Rate Last Admin    albuterol (PROVENTIL) nebulizer solution 0.083% 2.5 mg/3mL  2.5 mg Nebulization Q6H PRN Montez Dan MD        ALPRAZolam (XANAX) tablet 0.25 mg  0.25 mg Oral BID PRN Montez Dan MD        amiodarone (PACERONE) tablet 200 mg  200 mg Oral Q24H Montez Dan MD        apixaban (ELIQUIS) tablet 5 mg  5 mg Oral Q12H Montez Dan MD        bisacodyl (DULCOLAX) suppository 10 mg  10 mg Rectal Daily PRN Montez Dan MD        busPIRone (BUSPAR) tablet 5 mg  5 mg Oral TID With Meals Montez Dan MD        DULoxetine (CYMBALTA) DR capsule 60 mg  60 mg Oral Daily Montez Dan MD        First Mouthwash (Magic Mouthwash) 5 mL  5 mL Swish & Spit TID PRN Valencia Arias APRN        gabapentin (NEURONTIN) capsule 200 mg  200 mg Oral TID Montez Dan MD        hydrALAZINE (APRESOLINE) injection 5 mg  5 mg Intravenous Q4H PRN Montez Dan MD        HYDROmorphone (DILAUDID) injection 0.5 mg  0.5 mg Intravenous Q12H PRN Valencia Arias APRN        ipratropium-albuterol (DUO-NEB) nebulizer solution 3 mL  3 mL Nebulization Q6H PRN Montez Dan MD        lidocaine (LIDODERM) 5 % 2 patch  2 patch Transdermal Q24H Montez Dan MD        metoprolol succinate XL (TOPROL-XL) 24 hr tablet 50 mg  50 mg Oral Q24H Montze Dan MD        Morphine sulfate (PF) injection 2 mg  2 mg Intravenous Once per day on Tue Fri oMntez Dan MD        OLANZapine (zyPREXA) tablet 5 mg  5 mg Oral Nightly PRN Montez Dan MD        ondansetron (ZOFRAN) tablet 4 mg  4 mg Oral Q6H PRN Montez Dan MD        Or    ondansetron (ZOFRAN) injection 4 mg  4 mg Intravenous Q6H PRN Montez Dan MD        oxyCODONE (ROXICODONE) immediate release tablet 7.5 mg  7.5 mg Oral Q6H PRN Valencia Arias, APRN         "pantoprazole (PROTONIX) EC tablet 40 mg  40 mg Oral BID Montez Dan MD        polyethylene glycol (MIRALAX) packet 17 g  17 g Oral BID Montez Dan MD        sennosides-docusate (PERICOLACE) 8.6-50 MG per tablet 2 tablet  2 tablet Oral BID Montez Dan MD        simethicone (MYLICON) chewable tablet 80 mg  80 mg Oral 4x Daily Montez Dan MD        sodium chloride nasal spray 2 spray  2 spray Each Nare Marilee Avilez APRN        sodium hypochlorite (HYSEPT) 0.25 % topical solution   Topical Q12H Montez Dan MD        traZODone (DESYREL) tablet 150 mg  150 mg Oral Nightly Montez Dan MD        vitamin D3 tablet 5,000 Units  5,000 Units Oral Daily Montez Dan MD           Vital Sign Min/Max for last 24 hours  No data recorded   BP  Min: 139/59  Max: 139/59   No data recorded   No data recorded   No data recorded   No data recorded   Weight  Min: 87.5 kg (193 lb)  Max: 87.5 kg (193 lb)     Flowsheet Rows      Flowsheet Row First Filed Value   Admission Height 172.7 cm (68\") Documented at 08/22/2023 0900   Admission Weight 88.4 kg (194 lb 14.4 oz) Documented at 08/22/2023 0900                Physical Exam:    General Appearance: Awake, alert, in no acute distress  Eyes: Pupils equal and reactive    Ears: Appear intact with no abnormalities noted  Nose: Nares normal, no drainage  Neck: supple, trachea midline, no carotid bruit and no JVD  Back: no kyphosis present,    Lungs: respirations regular, respirations even and respirations unlabored  Heart: normal S1, S2, no significant murmurs   No gallops or rubs  no rub and no click  Abdomen: normal bowel sounds, no tenderness   Skin: no bleeding, bruising or rash  Extremities: no cyanosis  Psychiatric/Behavioral: Negative for agitation, behavioral problems, confusion, the patient does  appear to be nervous/anxious.       Results Review:   I reviewed the patient's new clinical results.  I " reviewed the patient's new imaging results and agree with the interpretation.  I reviewed the patient's other test results and agree with the interpretation  I personally viewed and interpreted the patient's EKG/Telemetry data    Discussed with patient  Updated patient regarding any new or relevant abnormalities on review of records or any new findings on physical exam.   Mentioned to patient about purpose of visit and desirable health short and long term goals and objectives.     Reviewed available prior notes, consults, prior visits, laboratory findings, radiology and cardiology relevant reports.   Updated chart as applicable.   I have reviewed the patient's medical history in detail and updated the computerized patient record as relevant.          Assessment & Plan       Acute tracheostomy management    Acute on chronic respiratory failure with hypoxia and hypercapnia  Atypical chest pain      Plan    We will get complete transthoracic echocardiogram  Can get outpatient nuclear perfusion scan or other modalities for ischemia evaluation such as stress echo or coronary CT angiogram  Her symptoms are not very convincing for ischemia  Care plan discussed with her as well as daughter by bedside  Daughter wants physical therapy to get her out of bed into chair and get her more active  She will discuss this with primary attending  Further recommendation pending results of echocardiogram        Max Sharma MD  09/08/23  18:46 CDT    EMR Dragon/Transcription was used to dictate part of this note

## 2023-09-09 PROCEDURE — 25010000002 HYDROMORPHONE PER 4 MG: Performed by: NURSE PRACTITIONER

## 2023-09-09 PROCEDURE — 97530 THERAPEUTIC ACTIVITIES: CPT

## 2023-09-09 NOTE — PROGRESS NOTES
ANISH Carbajal APRN        Internal Medicine Progress Note    9/9/2023   09:08 CDT    Name:  Emili Schmitt  MRN:    [id number]     Acct:     [id number]   Room:  [address] [place] [address] Day: 0     Admit Date: 8/21/2023  6:54 PM  PCP: Remedios Qiu APRN    Subjective:     C/C: need for continued rehabilitation    Interval History: Status:  stayed the same. Resting in bed. No family at bedside.  Afebrile. 02 sats stable with 02 at 2 lpm prn. Pain controlled at present no new concerns. Staff report daughter does not want patient on Dilaudid IV for pain.      Review of Systems   Constitutional: Positive for malaise/fatigue. Negative for chills, decreased appetite, weight gain and weight loss.   HENT:  Negative for congestion, ear discharge, hoarse voice and tinnitus.    Eyes:  Negative for blurred vision, discharge, visual disturbance and visual halos.   Cardiovascular:  Positive for dyspnea on exertion. Negative for chest pain, claudication, irregular heartbeat, leg swelling, orthopnea and paroxysmal nocturnal dyspnea.   Respiratory:  Positive for cough and shortness of breath. Negative for sputum production and wheezing.    Endocrine: Negative for cold intolerance, heat intolerance and polyuria.   Hematologic/Lymphatic: Negative for adenopathy. Does not bruise/bleed easily.   Skin:  Positive for poor wound healing. Negative for dry skin, itching and suspicious lesions.   Musculoskeletal:  Negative for arthritis, back pain, falls, joint pain, muscle weakness and myalgias.   Gastrointestinal:  Negative for abdominal pain, constipation, diarrhea, dysphagia and hematemesis.   Genitourinary:  Negative for bladder incontinence, dysuria and frequency.   Neurological:  Positive for weakness. Negative for aphonia, disturbances in coordination and dizziness.   Psychiatric/Behavioral:  Negative for altered mental status, depression, memory loss and substance abuse. The patient does not  have insomnia and is not nervous/anxious.        Medications:     Allergies:   Allergies   Allergen Reactions    Hydrocodone Itching     Severe itching    Tylenol [Acetaminophen] Itching       Current Meds:   Current Facility-Administered Medications:     albuterol (PROVENTIL) nebulizer solution 0.083% 2.5 mg/3mL, 2.5 mg, Nebulization, Q6H PRN, Montez Dan MD    ALPRAZolam (XANAX) tablet 0.25 mg, 0.25 mg, Oral, BID PRN, Montez Dan MD    amiodarone (PACERONE) tablet 200 mg, 200 mg, Oral, Q24H, Montez Dan MD    apixaban (ELIQUIS) tablet 5 mg, 5 mg, Oral, Q12H, Montez Dan MD    bisacodyl (DULCOLAX) suppository 10 mg, 10 mg, Rectal, Daily PRN, Montez Dan MD    busPIRone (BUSPAR) tablet 5 mg, 5 mg, Oral, TID With Meals, Montez Dan MD    DULoxetine (CYMBALTA) DR capsule 60 mg, 60 mg, Oral, Daily, Montez Dan MD    First Mouthwash (Magic Mouthwash) 5 mL, 5 mL, Swish & Spit, TID PRN, Valencia Arias APRN    gabapentin (NEURONTIN) capsule 200 mg, 200 mg, Oral, TID, Montez Dan MD    hydrALAZINE (APRESOLINE) injection 5 mg, 5 mg, Intravenous, Q4H PRN, Montez Dan MD    HYDROmorphone (DILAUDID) injection 0.5 mg, 0.5 mg, Intravenous, Q12H PRN, Valencia Arias APRN    ipratropium-albuterol (DUO-NEB) nebulizer solution 3 mL, 3 mL, Nebulization, Q6H PRN, Montez Dan MD    lidocaine (LIDODERM) 5 % 2 patch, 2 patch, Transdermal, Q24H, Montez Dan MD    metoprolol succinate XL (TOPROL-XL) 24 hr tablet 50 mg, 50 mg, Oral, Q24H, Montez Dan MD    Morphine sulfate (PF) injection 2 mg, 2 mg, Intravenous, Once per day on Sy Pérez Richard Scott, MD    OLANZapine (zyPREXA) tablet 5 mg, 5 mg, Oral, Nightly PRN, Montez Dan MD    ondansetron (ZOFRAN) tablet 4 mg, 4 mg, Oral, Q6H PRN **OR** ondansetron (ZOFRAN) injection 4 mg, 4 mg, Intravenous, Q6H PRN, Montez Dan MD     "oxyCODONE (ROXICODONE) immediate release tablet 7.5 mg, 7.5 mg, Oral, Q6H PRN, SkabValencia robertson R, APRN    pantoprazole (PROTONIX) EC tablet 40 mg, 40 mg, Oral, BID, Montez Dan MD    polyethylene glycol (MIRALAX) packet 17 g, 17 g, Oral, BID, Montez Dan MD    sennosides-docusate (PERICOLACE) 8.6-50 MG per tablet 2 tablet, 2 tablet, Oral, BID, Montez Dan MD    simethicone (MYLICON) chewable tablet 80 mg, 80 mg, Oral, 4x Daily, Montez Dan MD    sodium chloride nasal spray 2 spray, 2 spray, Each Nare, PRN, Marilee Botello, BHAVESH    sodium hypochlorite (HYSEPT) 0.25 % topical solution, , Topical, Q12H, Montez Dan MD    traZODone (DESYREL) tablet 150 mg, 150 mg, Oral, Nightly, Montez Dan MD    vitamin D3 tablet 5,000 Units, 5,000 Units, Oral, Daily, Montez Dan MD    Data:     Code Status:    There are no questions and answers to display.       Family History   Problem Relation Age of Onset    Breast cancer Neg Hx     Ovarian cancer Neg Hx     Uterine cancer Neg Hx     Colon cancer Neg Hx     Melanoma Neg Hx        Social History     Socioeconomic History    Marital status:    Tobacco Use    Smoking status: Every Day     Packs/day: 1.00     Types: Cigarettes    Smokeless tobacco: Never   Substance and Sexual Activity    Alcohol use: Never    Drug use: Never    Sexual activity: Yes     Partners: Male     Birth control/protection: Surgical       Vitals:  /59   Ht 172.7 cm (68\")   Wt 87.5 kg (193 lb)   BMI 29.35 kg/m²   T 98.0 P 61 R 16 /84 Sp02 99% (2 lpm)            I/O (24Hr):  No intake or output data in the 24 hours ending 09/09/23 0908    Labs and imaging:      No results found for this or any previous visit (from the past 12 hour(s)).                    Physical Examination:        Physical Exam  Vitals and nursing note reviewed.   Constitutional:       Appearance: Normal appearance.   HENT:      Head: " Normocephalic and atraumatic.      Right Ear: External ear normal.      Left Ear: External ear normal.      Nose: Nose normal.      Mouth/Throat:      Mouth: Mucous membranes are moist.      Pharynx: Oropharynx is clear.   Eyes:      Extraocular Movements: Extraocular movements intact.      Conjunctiva/sclera: Conjunctivae normal.      Pupils: Pupils are equal, round, and reactive to light.   Neck:      Comments: Dressing c.d.I.  Cardiovascular:      Rate and Rhythm: Normal rate and regular rhythm.      Pulses: Normal pulses.      Heart sounds: Normal heart sounds.   Pulmonary:      Effort: Pulmonary effort is normal.      Breath sounds: Normal breath sounds.   Abdominal:      General: Bowel sounds are normal.      Palpations: Abdomen is soft.   Musculoskeletal:      Comments: Generalized weakness   Skin:     Comments: Wound vac intact   Neurological:      Mental Status: She is alert and oriented to person, place, and time.      Comments: Intention tremor - worse to LUE   Psychiatric:         Mood and Affect: Mood normal.         Behavior: Behavior normal.         Assessment:             Acute tracheostomy management    Acute on chronic respiratory failure with hypoxia and hypercapnia    Past Medical History:   Diagnosis Date    Arthritis     Interstitial cystitis     Macular dystrophy     Neuropathy     Restless leg syndrome         Plan:        Acute hypoxic respiratory failure s/p tracheostomy tube  PAF  Acute RUE DVT  Chronic anticoagulation  Multifactorial anemia  Peripheral neuropathy  DM2 with hyperglycemia not on long term insulin  HTN  Anxiety  GERD  Constipation  Unstageable decubitus ulcer to coccyx  Continue current treatment. Monitor counts. Increase activity. Labs Monday. Oxygen weaning as tolerated.  Wound care per wound care team. Aggressive therapies as tolerated. Maintain patient safety.  Continue pain management efforts. Monitor for recurrence of chest pain and await troponin. Encourage increased  participation with therapies.       Electronically signed by BHAVESH Nunn on 9/9/2023 at 09:08 CDT     I have discussed the care of Emili Schmitt, including pertinent history and exam findings, with the nurse practitioner.    I have seen and examined the patient and the key elements of all parts of the encounter have been performed by me.  I agree with the assessment, plan and orders as documented by BHAVESH Velázquez, after I modified the exam findings and the plan of treatments and the final version is my approved version of the assessment.        Electronically signed by Montez Dan MD on 9/9/2023 at 18:56 CDT

## 2023-09-09 NOTE — PROGRESS NOTES
LOS: 0 days   Patient Care Team:  Remedios Qiu APRN as PCP - General (Family Medicine)    Chief Complaint: Atypical chest pain     Subjective    Emili Schmitt is a 62 y.o. female who is being seen in follow-up.  Overnight feels better  No chest pain  No palpitation  No presyncope  No syncope  No orthopnea  No paroxysmal nocturnal dyspnea  Getting physical therapy today  Daughter by bedside  Overall feels improved  Still very anxious  No other current interim issues or events  Echo performed yesterday which I visualized shows normal left ventricular ejection fraction and wall motion    Telemetry: no malignant arrhythmia. No significant pauses.    Review of Systems   Constitutional: No chills   Has fatigue   No fever.   HENT: Negative.    Eyes: Negative.    Respiratory: Negative for cough,   No chest wall soreness,   Shortness of breath,   no wheezing, no stridor.    Cardiovascular: As above  Gastrointestinal: Negative for abdominal distention,  No abdominal pain,   No blood in stool,   No constipation,   No diarrhea,   No nausea   No vomiting.   Endocrine: Negative.    Genitourinary: Negative for difficulty urinating, dysuria, flank pain and hematuria.   Musculoskeletal: Negative.    Skin: Negative for rash and wound.   Allergic/Immunologic: Negative.    Neurological: Negative for dizziness, syncope, weakness,   No light-headedness  No  headaches.   Hematological: Does not bruise/bleed easily.   Psychiatric/Behavioral: Negative for agitation or behavioral problems,   No confusion,   the patient is  nervous/anxious.       History:   Past Medical History:   Diagnosis Date    Arthritis     Interstitial cystitis     Macular dystrophy     Neuropathy     Restless leg syndrome      Past Surgical History:   Procedure Laterality Date    APPENDECTOMY      BREAST CYST ASPIRATION Left     HERNIA REPAIR      HYSTERECTOMY      TV unsure if she has tubes due to hemmorage after birth    OTHER SURGICAL HISTORY       TIF surgery     Social History     Socioeconomic History    Marital status:    Tobacco Use    Smoking status: Every Day     Packs/day: 1.00     Types: Cigarettes    Smokeless tobacco: Never   Substance and Sexual Activity    Alcohol use: Never    Drug use: Never    Sexual activity: Yes     Partners: Male     Birth control/protection: Surgical     Family History   Problem Relation Age of Onset    Breast cancer Neg Hx     Ovarian cancer Neg Hx     Uterine cancer Neg Hx     Colon cancer Neg Hx     Melanoma Neg Hx        Labs:  WBC WBC   Date Value Ref Range Status   09/07/2023 6.76 3.40 - 10.80 10*3/mm3 Final      HGB Hemoglobin   Date Value Ref Range Status   09/07/2023 9.7 (L) 12.0 - 15.9 g/dL Final      HCT Hematocrit   Date Value Ref Range Status   09/07/2023 32.1 (L) 34.0 - 46.6 % Final      Platelets Platelets   Date Value Ref Range Status   09/07/2023 409 140 - 450 10*3/mm3 Final      MCV MCV   Date Value Ref Range Status   09/07/2023 97.0 79.0 - 97.0 fL Final        Results from last 7 days   Lab Units 09/07/23  0543 09/05/23  0529   SODIUM mmol/L 140 140   POTASSIUM mmol/L 4.0 4.3   CHLORIDE mmol/L 101 101   CO2 mmol/L 28.0 30.0*   BUN mg/dL 9 8   CREATININE mg/dL 0.34* 0.28*   CALCIUM mg/dL 9.8 10.1   BILIRUBIN mg/dL  --  0.2   ALK PHOS U/L  --  78   ALT (SGPT) U/L  --  23   AST (SGOT) U/L  --  13   GLUCOSE mg/dL 140* 123*     Lab Results   Component Value Date    TROPONINT 29 (H) 09/08/2023     PT/INR:  No results found for: PROTIME/No results found for: INR    Imaging Results (Last 72 Hours)       ** No results found for the last 72 hours. **            Objective     Allergies   Allergen Reactions    Hydrocodone Itching     Severe itching    Tylenol [Acetaminophen] Itching       Medication Review: Performed  Current Facility-Administered Medications   Medication Dose Route Frequency Provider Last Rate Last Admin    albuterol (PROVENTIL) nebulizer solution 0.083% 2.5 mg/3mL  2.5 mg Nebulization Q6H  PRN Montez Dan MD        ALPRAZolam (XANAX) tablet 0.25 mg  0.25 mg Oral BID PRN Montez Dan MD        amiodarone (PACERONE) tablet 200 mg  200 mg Oral Q24H Montez Dan MD        apixaban (ELIQUIS) tablet 5 mg  5 mg Oral Q12H Montez Dan MD        bisacodyl (DULCOLAX) suppository 10 mg  10 mg Rectal Daily PRN Montez Dan MD        busPIRone (BUSPAR) tablet 5 mg  5 mg Oral TID With Meals Montez Dan MD        DULoxetine (CYMBALTA) DR capsule 60 mg  60 mg Oral Daily Montez Dan MD        First Mouthwash (Magic Mouthwash) 5 mL  5 mL Swish & Spit TID PRN Valencia Arias APRN        gabapentin (NEURONTIN) capsule 200 mg  200 mg Oral TID Montez Dan MD        hydrALAZINE (APRESOLINE) injection 5 mg  5 mg Intravenous Q4H PRN Montez Dan MD        HYDROmorphone (DILAUDID) injection 0.5 mg  0.5 mg Intravenous Q12H PRN Valencia Arias APRN        ipratropium-albuterol (DUO-NEB) nebulizer solution 3 mL  3 mL Nebulization Q6H PRN Montez Dan MD        lidocaine (LIDODERM) 5 % 2 patch  2 patch Transdermal Q24H Montez Dan MD        metoprolol succinate XL (TOPROL-XL) 24 hr tablet 50 mg  50 mg Oral Q24H Montez Dan MD        Morphine sulfate (PF) injection 2 mg  2 mg Intravenous Once per day on Tue Fri Montez Dan MD        OLANZapine (zyPREXA) tablet 5 mg  5 mg Oral Nightly PRN Montez Dan MD        ondansetron (ZOFRAN) tablet 4 mg  4 mg Oral Q6H PRN Montez Dan MD        Or    ondansetron (ZOFRAN) injection 4 mg  4 mg Intravenous Q6H PRN Montez Dan MD        oxyCODONE (ROXICODONE) immediate release tablet 7.5 mg  7.5 mg Oral Q6H PRN Valencia Arias APRN        pantoprazole (PROTONIX) EC tablet 40 mg  40 mg Oral BID Montez Dan MD        polyethylene glycol (MIRALAX) packet 17 g  17 g Oral BID Montez Dan MD         "sennosides-docusate (PERICOLACE) 8.6-50 MG per tablet 2 tablet  2 tablet Oral BID Montez Dan MD        simethicone (MYLICON) chewable tablet 80 mg  80 mg Oral 4x Daily Montez Dan MD        sodium chloride nasal spray 2 spray  2 spray Each Nare PRN Marilee Botello APRN        sodium hypochlorite (HYSEPT) 0.25 % topical solution   Topical Q12H Montez Dan MD        traZODone (DESYREL) tablet 150 mg  150 mg Oral Nightly Montez Dan MD        vitamin D3 tablet 5,000 Units  5,000 Units Oral Daily Montez Dan MD           Vital Sign Min/Max for last 24 hours  No data recorded   No data recorded   No data recorded   No data recorded   No data recorded   No data recorded   No data recorded     Flowsheet Rows      Flowsheet Row First Filed Value   Admission Height 172.7 cm (68\") Documented at 08/22/2023 0900   Admission Weight 88.4 kg (194 lb 14.4 oz) Documented at 08/22/2023 0900                Physical Exam:    General Appearance: Awake, alert, in no acute distress  Eyes: Pupils equal and reactive    Ears: Appear intact with no abnormalities noted  Nose: Nares normal, no drainage  Neck: supple, trachea midline, no carotid bruit and no JVD  Back: no kyphosis present,    Lungs: respirations regular, respirations even and respirations unlabored  Heart: normal S1, S2, no significant murmurs   No gallops or rubs  no rub and no click  Abdomen: normal bowel sounds, no tenderness   Skin: no bleeding, bruising or rash  Extremities: no cyanosis  Psychiatric/Behavioral: Negative for agitation, behavioral problems, confusion, the patient does  appear to be nervous/anxious.       Results Review:   I reviewed the patient's new clinical results.  I reviewed the patient's new imaging results and agree with the interpretation.  I reviewed the patient's other test results and agree with the interpretation  I personally viewed and interpreted the patient's EKG/Telemetry " data    Discussed with patient  Updated patient regarding any new or relevant abnormalities on review of records or any new findings on physical exam.   Mentioned to patient about purpose of visit and desirable health short and long term goals and objectives.     Reviewed available prior notes, consults, prior visits, laboratory findings, radiology and cardiology relevant reports.   Updated chart as applicable.   I have reviewed the patient's medical history in detail and updated the computerized patient record as relevant.          Assessment & Plan       Acute tracheostomy management    Acute on chronic respiratory failure with hypoxia and hypercapnia  Atypical chest pain  History of DVT  Chronic anticoagulation  Peripheral neuropathy  Diabetes mellitus  Hypertension  Chronic anxiety  Gastroesophageal reflux disease    Plan    Results of echocardiogram reviewed and discussed with patient and daughter  She requires ischemia work-up  This can be performed as outpatient once her physical deconditioning has improved  Other attendings notes were reviewed  Care plan discussed with her and they are in agreement  If any interim issues we will see her back during this hospitalization  Her daughter works with me and will arrange for follow-up of her in [facility] per their request within 2 to 4 weeks of discharge  Telemetry  Deep vein thrombosis prophylaxis/precautions  Appropriate diet, fluid, sodium, caffeine, stimulants intake   Questions were encouraged, asked and answered to the patient's  understanding and satisfaction.  Compliance to diet and medications       Max Sharma MD  09/09/23  14:57 CDT    EMR Dragon/Transcription was used to dictate part of this note

## 2023-09-10 LAB
BH CV ECHO LEFT VENTRICLE GLOBAL LONGITUDINAL STRAIN: -8.3 %
BH CV ECHO MEAS - AO MAX PG: 8.6 MMHG
BH CV ECHO MEAS - AO MEAN PG: 5 MMHG
BH CV ECHO MEAS - AO V2 MAX: 147 CM/SEC
BH CV ECHO MEAS - AO V2 VTI: 24.6 CM
BH CV ECHO MEAS - AVA(I,D): 3.9 CM2
BH CV ECHO MEAS - EDV(CUBED): 124.3 ML
BH CV ECHO MEAS - EDV(MOD-SP4): 71.3 ML
BH CV ECHO MEAS - ESV(CUBED): 40.4 ML
BH CV ECHO MEAS - FS: 31.3 %
BH CV ECHO MEAS - IVS/LVPW: 1.15 CM
BH CV ECHO MEAS - IVSD: 1.28 CM
BH CV ECHO MEAS - LA DIMENSION: 3.6 CM
BH CV ECHO MEAS - LAT PEAK E' VEL: 12.1 CM/SEC
BH CV ECHO MEAS - LV DIASTOLIC VOL/BSA (35-75): 36.8 CM2
BH CV ECHO MEAS - LV MASS(C)D: 231.6 GRAMS
BH CV ECHO MEAS - LV MAX PG: 5.9 MMHG
BH CV ECHO MEAS - LV MEAN PG: 3 MMHG
BH CV ECHO MEAS - LV V1 MAX: 121 CM/SEC
BH CV ECHO MEAS - LV V1 VTI: 23 CM
BH CV ECHO MEAS - LVIDD: 5 CM
BH CV ECHO MEAS - LVIDS: 3.4 CM
BH CV ECHO MEAS - LVOT AREA: 4.2 CM2
BH CV ECHO MEAS - LVOT DIAM: 2.3 CM
BH CV ECHO MEAS - LVPWD: 1.11 CM
BH CV ECHO MEAS - MED PEAK E' VEL: 6.5 CM/SEC
BH CV ECHO MEAS - MV A MAX VEL: 78.4 CM/SEC
BH CV ECHO MEAS - MV DEC SLOPE: 324 CM/SEC2
BH CV ECHO MEAS - MV E MAX VEL: 72.8 CM/SEC
BH CV ECHO MEAS - MV E/A: 0.93
BH CV ECHO MEAS - MV P1/2T: 77.3 MSEC
BH CV ECHO MEAS - MVA(P1/2T): 2.8 CM2
BH CV ECHO MEAS - PA V2 MAX: 122 CM/SEC
BH CV ECHO MEAS - SV(LVOT): 95.6 ML
BH CV ECHO MEAS - TAPSE (>1.6): 2.07 CM
BH CV ECHO MEASUREMENTS AVERAGE E/E' RATIO: 7.83
BH CV XLRA - RV BASE: 3.3 CM
LEFT ATRIUM VOLUME: 30.8 ML

## 2023-09-10 PROCEDURE — 25010000002 ONDANSETRON PER 1 MG: Performed by: INTERNAL MEDICINE

## 2023-09-10 PROCEDURE — 25010000002 HYDROMORPHONE PER 4 MG: Performed by: INTERNAL MEDICINE

## 2023-09-10 RX ORDER — HYDROMORPHONE HYDROCHLORIDE 1 MG/ML
0.5 INJECTION, SOLUTION INTRAMUSCULAR; INTRAVENOUS; SUBCUTANEOUS EVERY 12 HOURS PRN
Status: DISCONTINUED | OUTPATIENT
Start: 2023-09-10 | End: 2023-09-13 | Stop reason: HOSPADM

## 2023-09-10 RX ORDER — OXYCODONE HYDROCHLORIDE 5 MG/1
7.5 TABLET ORAL EVERY 6 HOURS PRN
Status: DISCONTINUED | OUTPATIENT
Start: 2023-09-10 | End: 2023-09-13 | Stop reason: HOSPADM

## 2023-09-10 NOTE — PROGRESS NOTES
ANISH Carbajal APRN        Internal Medicine Progress Note    9/10/2023   09:01 CDT    Name:  Emili Schmitt  MRN:    [id number]     Acct:     [id number]   Room:  [address] [place] [address] Day: 0     Admit Date: 8/21/2023  6:54 PM  PCP: Remedios Qiu APRN    Subjective:     C/C: need for continued rehabilitation    Interval History: Status:  stayed the same. Resting in bed.  at bedside. Pt complaining of pain states she just took her PRN medication. Asking about possible discharge to swing bed closer to home next week and if her insurance approved.  Will have CM come speak with her on Monday regarding.      Review of Systems   Constitutional: Positive for malaise/fatigue. Negative for chills, decreased appetite, weight gain and weight loss.   HENT:  Negative for congestion, ear discharge, hoarse voice and tinnitus.    Eyes:  Negative for blurred vision, discharge, visual disturbance and visual halos.   Cardiovascular:  Positive for dyspnea on exertion. Negative for chest pain, claudication, irregular heartbeat, leg swelling, orthopnea and paroxysmal nocturnal dyspnea.   Respiratory:  Positive for cough and shortness of breath. Negative for sputum production and wheezing.    Endocrine: Negative for cold intolerance, heat intolerance and polyuria.   Hematologic/Lymphatic: Negative for adenopathy. Does not bruise/bleed easily.   Skin:  Positive for poor wound healing. Negative for dry skin, itching and suspicious lesions.   Musculoskeletal:  Negative for arthritis, back pain, falls, joint pain, muscle weakness and myalgias.   Gastrointestinal:  Negative for abdominal pain, constipation, diarrhea, dysphagia and hematemesis.   Genitourinary:  Negative for bladder incontinence, dysuria and frequency.   Neurological:  Positive for weakness. Negative for aphonia, disturbances in coordination and dizziness.   Psychiatric/Behavioral:  Negative for altered mental status,  depression, memory loss and substance abuse. The patient does not have insomnia and is not nervous/anxious.        Medications:     Allergies:   Allergies   Allergen Reactions    Hydrocodone Itching     Severe itching    Tylenol [Acetaminophen] Itching       Current Meds:   Current Facility-Administered Medications:     albuterol (PROVENTIL) nebulizer solution 0.083% 2.5 mg/3mL, 2.5 mg, Nebulization, Q6H PRN, Montez Dan MD    ALPRAZolam (XANAX) tablet 0.25 mg, 0.25 mg, Oral, BID PRN, Montez Dan MD    amiodarone (PACERONE) tablet 200 mg, 200 mg, Oral, Q24H, Montez Dan MD    apixaban (ELIQUIS) tablet 5 mg, 5 mg, Oral, Q12H, Montez Dan MD    bisacodyl (DULCOLAX) suppository 10 mg, 10 mg, Rectal, Daily PRN, Montez Dan MD    busPIRone (BUSPAR) tablet 5 mg, 5 mg, Oral, TID With Meals, Montez Dan MD    DULoxetine (CYMBALTA) DR capsule 60 mg, 60 mg, Oral, Daily, Montez Dan MD    First Mouthwash (Magic Mouthwash) 5 mL, 5 mL, Swish & Spit, TID PRN, Valencia Arias APRN    gabapentin (NEURONTIN) capsule 200 mg, 200 mg, Oral, TID, Montez Dan MD    hydrALAZINE (APRESOLINE) injection 5 mg, 5 mg, Intravenous, Q4H PRN, Montez Dan MD    HYDROmorphone (DILAUDID) injection 0.5 mg, 0.5 mg, Intravenous, Q12H PRN, Montez Dan MD    ipratropium-albuterol (DUO-NEB) nebulizer solution 3 mL, 3 mL, Nebulization, Q6H PRN, Montez Dan MD    lidocaine (LIDODERM) 5 % 2 patch, 2 patch, Transdermal, Q24H, Montez Dan MD    metoprolol succinate XL (TOPROL-XL) 24 hr tablet 50 mg, 50 mg, Oral, Q24H, Montez Dan MD    Morphine sulfate (PF) injection 2 mg, 2 mg, Intravenous, Once per day on Tue Fri, Montez Dan MD    OLANZapine (zyPREXA) tablet 5 mg, 5 mg, Oral, Nightly PRN, Montez Dan MD    ondansetron (ZOFRAN) tablet 4 mg, 4 mg, Oral, Q6H PRN **OR** ondansetron (ZOFRAN)  "injection 4 mg, 4 mg, Intravenous, Q6H PRN, Montez Dan MD    oxyCODONE (ROXICODONE) immediate release tablet 7.5 mg, 7.5 mg, Oral, Q6H PRN, Montez Dan MD    pantoprazole (PROTONIX) EC tablet 40 mg, 40 mg, Oral, BID, Montez Dan MD    polyethylene glycol (MIRALAX) packet 17 g, 17 g, Oral, BID, Montez Dan MD    sennosides-docusate (PERICOLACE) 8.6-50 MG per tablet 2 tablet, 2 tablet, Oral, BID, Montez Dan MD    simethicone (MYLICON) chewable tablet 80 mg, 80 mg, Oral, 4x Daily, Montez Dan MD    sodium chloride nasal spray 2 spray, 2 spray, Each Nare, PRN, Marilee Botello APRN    sodium hypochlorite (HYSEPT) 0.25 % topical solution, , Topical, Q12H, Montez Dan MD    traZODone (DESYREL) tablet 150 mg, 150 mg, Oral, Nightly, Montez Dan MD    vitamin D3 tablet 5,000 Units, 5,000 Units, Oral, Daily, Montez Dan MD    Data:     Code Status:    There are no questions and answers to display.       Family History   Problem Relation Age of Onset    Breast cancer Neg Hx     Ovarian cancer Neg Hx     Uterine cancer Neg Hx     Colon cancer Neg Hx     Melanoma Neg Hx        Social History     Socioeconomic History    Marital status:    Tobacco Use    Smoking status: Every Day     Packs/day: 1.00     Types: Cigarettes    Smokeless tobacco: Never   Substance and Sexual Activity    Alcohol use: Never    Drug use: Never    Sexual activity: Yes     Partners: Male     Birth control/protection: Surgical       Vitals:  /59   Ht 172.7 cm (68\")   Wt 87.5 kg (193 lb)   BMI 29.35 kg/m²   T 98.0 P 61 R 16 /84 Sp02 99% (2 lpm)            I/O (24Hr):  No intake or output data in the 24 hours ending 09/10/23 0901    Labs and imaging:      No results found for this or any previous visit (from the past 12 hour(s)).                    Physical Examination:        Physical Exam  Vitals and nursing note reviewed. "   Constitutional:       Appearance: Normal appearance.   HENT:      Head: Normocephalic and atraumatic.      Right Ear: External ear normal.      Left Ear: External ear normal.      Nose: Nose normal.      Mouth/Throat:      Mouth: Mucous membranes are moist.      Pharynx: Oropharynx is clear.   Eyes:      Extraocular Movements: Extraocular movements intact.      Conjunctiva/sclera: Conjunctivae normal.      Pupils: Pupils are equal, round, and reactive to light.   Neck:      Comments: Dressing c.d.I.  Cardiovascular:      Rate and Rhythm: Normal rate and regular rhythm.      Pulses: Normal pulses.      Heart sounds: Normal heart sounds.   Pulmonary:      Effort: Pulmonary effort is normal.      Breath sounds: Normal breath sounds.   Abdominal:      General: Bowel sounds are normal.      Palpations: Abdomen is soft.   Musculoskeletal:      Comments: Generalized weakness   Skin:     Comments: Wound vac intact   Neurological:      Mental Status: She is alert and oriented to person, place, and time.      Comments: Intention tremor - worse to LUE   Psychiatric:         Mood and Affect: Mood normal.         Behavior: Behavior normal.         Assessment:             Acute tracheostomy management    Acute on chronic respiratory failure with hypoxia and hypercapnia    Past Medical History:   Diagnosis Date    Arthritis     Interstitial cystitis     Macular dystrophy     Neuropathy     Restless leg syndrome         Plan:        Acute hypoxic respiratory failure s/p tracheostomy tube  PAF  Acute RUE DVT  Chronic anticoagulation  Multifactorial anemia  Peripheral neuropathy  DM2 with hyperglycemia not on long term insulin  HTN  Anxiety  GERD  Constipation  Unstageable decubitus ulcer to coccyx  Continue current treatment. Monitor counts. Increase activity. Labs Monday. Oxygen weaning as tolerated.  Wound care per wound care team. Aggressive therapies as tolerated. Maintain patient safety.  Continue pain management efforts.  Monitor for recurrence of chest pain and await troponin. Encourage increased participation with therapies.       Electronically signed by BHAVESH Nunn on 9/10/2023 at 09:01 CDT

## 2023-09-11 VITALS
HEIGHT: 68 IN | DIASTOLIC BLOOD PRESSURE: 59 MMHG | WEIGHT: 195.8 LBS | BODY MASS INDEX: 29.67 KG/M2 | SYSTOLIC BLOOD PRESSURE: 139 MMHG

## 2023-09-11 LAB
ANION GAP SERPL CALCULATED.3IONS-SCNC: 8 MMOL/L (ref 5–15)
BASOPHILS # BLD AUTO: 0.03 10*3/MM3 (ref 0–0.2)
BASOPHILS NFR BLD AUTO: 0.4 % (ref 0–1.5)
BUN SERPL-MCNC: 7 MG/DL (ref 8–23)
BUN/CREAT SERPL: 20 (ref 7–25)
CALCIUM SPEC-SCNC: 10.3 MG/DL (ref 8.6–10.5)
CHLORIDE SERPL-SCNC: 100 MMOL/L (ref 98–107)
CO2 SERPL-SCNC: 32 MMOL/L (ref 22–29)
CREAT SERPL-MCNC: 0.35 MG/DL (ref 0.57–1)
CRP SERPL-MCNC: 2.07 MG/DL (ref 0–0.5)
DEPRECATED RDW RBC AUTO: 52.1 FL (ref 37–54)
EGFRCR SERPLBLD CKD-EPI 2021: 116.5 ML/MIN/1.73
EOSINOPHIL # BLD AUTO: 0.34 10*3/MM3 (ref 0–0.4)
EOSINOPHIL NFR BLD AUTO: 4.9 % (ref 0.3–6.2)
ERYTHROCYTE [DISTWIDTH] IN BLOOD BY AUTOMATED COUNT: 14.9 % (ref 12.3–15.4)
ERYTHROCYTE [SEDIMENTATION RATE] IN BLOOD: 54 MM/HR (ref 0–30)
GLUCOSE SERPL-MCNC: 130 MG/DL (ref 65–99)
HCT VFR BLD AUTO: 33.6 % (ref 34–46.6)
HGB BLD-MCNC: 10 G/DL (ref 12–15.9)
IMM GRANULOCYTES # BLD AUTO: 0.05 10*3/MM3 (ref 0–0.05)
IMM GRANULOCYTES NFR BLD AUTO: 0.7 % (ref 0–0.5)
LYMPHOCYTES # BLD AUTO: 1.76 10*3/MM3 (ref 0.7–3.1)
LYMPHOCYTES NFR BLD AUTO: 25.5 % (ref 19.6–45.3)
MCH RBC QN AUTO: 28.7 PG (ref 26.6–33)
MCHC RBC AUTO-ENTMCNC: 29.8 G/DL (ref 31.5–35.7)
MCV RBC AUTO: 96.3 FL (ref 79–97)
MONOCYTES # BLD AUTO: 0.59 10*3/MM3 (ref 0.1–0.9)
MONOCYTES NFR BLD AUTO: 8.5 % (ref 5–12)
NEUTROPHILS NFR BLD AUTO: 4.14 10*3/MM3 (ref 1.7–7)
NEUTROPHILS NFR BLD AUTO: 60 % (ref 42.7–76)
NRBC BLD AUTO-RTO: 0 /100 WBC (ref 0–0.2)
PLATELET # BLD AUTO: 398 10*3/MM3 (ref 140–450)
PMV BLD AUTO: 10 FL (ref 6–12)
POTASSIUM SERPL-SCNC: 4.2 MMOL/L (ref 3.5–5.2)
RBC # BLD AUTO: 3.49 10*6/MM3 (ref 3.77–5.28)
SODIUM SERPL-SCNC: 140 MMOL/L (ref 136–145)
WBC NRBC COR # BLD: 6.91 10*3/MM3 (ref 3.4–10.8)

## 2023-09-11 PROCEDURE — 80048 BASIC METABOLIC PNL TOTAL CA: CPT | Performed by: INTERNAL MEDICINE

## 2023-09-11 PROCEDURE — 97530 THERAPEUTIC ACTIVITIES: CPT

## 2023-09-11 PROCEDURE — 86140 C-REACTIVE PROTEIN: CPT | Performed by: INTERNAL MEDICINE

## 2023-09-11 PROCEDURE — 97110 THERAPEUTIC EXERCISES: CPT

## 2023-09-11 PROCEDURE — 85025 COMPLETE CBC W/AUTO DIFF WBC: CPT | Performed by: INTERNAL MEDICINE

## 2023-09-11 PROCEDURE — 85652 RBC SED RATE AUTOMATED: CPT | Performed by: INTERNAL MEDICINE

## 2023-09-11 PROCEDURE — 25010000002 HYDROMORPHONE PER 4 MG: Performed by: INTERNAL MEDICINE

## 2023-09-11 NOTE — PROGRESS NOTES
ANISH Carbajal APRN        Internal Medicine Progress Note    9/11/2023   12:29 CDT    Name:  Emili Schmitt  MRN:    [id number]     Acct:     [id number]   Room:  [address] [place] [address] Day: 0     Admit Date: 8/21/2023  6:54 PM  PCP: Remedios Qiu APRN    Subjective:     C/C: need for continued rehabilitation    Interval History: Status:  stayed the same. Resting in bed. No family at bedside. Afebrile. Maintaining adequate 02 sats on room air. Reports that she stood 6 times with therapy and has tolerated transfers to the chair for short periods of time. Counts stable. Now hoping to complete therapy here rather than transfer to swing bed prior to planned return to home.     Review of Systems   Constitutional: Positive for malaise/fatigue. Negative for chills, decreased appetite, weight gain and weight loss.   HENT:  Negative for congestion, ear discharge, hoarse voice and tinnitus.    Eyes:  Negative for blurred vision, discharge, visual disturbance and visual halos.   Cardiovascular:  Positive for dyspnea on exertion. Negative for chest pain, claudication, irregular heartbeat, leg swelling, orthopnea and paroxysmal nocturnal dyspnea.   Respiratory:  Positive for cough and shortness of breath. Negative for sputum production and wheezing.    Endocrine: Negative for cold intolerance, heat intolerance and polyuria.   Hematologic/Lymphatic: Negative for adenopathy. Does not bruise/bleed easily.   Skin:  Positive for poor wound healing. Negative for dry skin, itching and suspicious lesions.   Musculoskeletal:  Negative for arthritis, back pain, falls, joint pain, muscle weakness and myalgias.   Gastrointestinal:  Negative for abdominal pain, constipation, diarrhea, dysphagia and hematemesis.   Genitourinary:  Negative for bladder incontinence, dysuria and frequency.   Neurological:  Positive for weakness. Negative for aphonia, disturbances in coordination and dizziness.    Psychiatric/Behavioral:  Negative for altered mental status, depression, memory loss and substance abuse. The patient does not have insomnia and is not nervous/anxious.        Medications:     Allergies:   Allergies   Allergen Reactions    Hydrocodone Itching     Severe itching    Tylenol [Acetaminophen] Itching       Current Meds:   Current Facility-Administered Medications:     albuterol (PROVENTIL) nebulizer solution 0.083% 2.5 mg/3mL, 2.5 mg, Nebulization, Q6H PRN, Montez Dan MD    ALPRAZolam (XANAX) tablet 0.25 mg, 0.25 mg, Oral, BID PRN, Montez Dan MD    amiodarone (PACERONE) tablet 200 mg, 200 mg, Oral, Q24H, Montez Dan MD    apixaban (ELIQUIS) tablet 5 mg, 5 mg, Oral, Q12H, Montez Dan MD    bisacodyl (DULCOLAX) suppository 10 mg, 10 mg, Rectal, Daily PRN, Montez Dan MD    busPIRone (BUSPAR) tablet 5 mg, 5 mg, Oral, TID With Meals, Montez Dan MD    DULoxetine (CYMBALTA) DR capsule 60 mg, 60 mg, Oral, Daily, Montez Dan MD    First Mouthwash (Magic Mouthwash) 5 mL, 5 mL, Swish & Spit, TID PRN, Valencia Arias APRN    gabapentin (NEURONTIN) capsule 200 mg, 200 mg, Oral, TID, Montez Dan MD    hydrALAZINE (APRESOLINE) injection 5 mg, 5 mg, Intravenous, Q4H PRN, Montez Dan MD    HYDROmorphone (DILAUDID) injection 0.5 mg, 0.5 mg, Intravenous, Q12H PRN, Montez Dan MD    ipratropium-albuterol (DUO-NEB) nebulizer solution 3 mL, 3 mL, Nebulization, Q6H PRN, Montez Dan MD    lidocaine (LIDODERM) 5 % 2 patch, 2 patch, Transdermal, Q24H, Montez Dan MD    metoprolol succinate XL (TOPROL-XL) 24 hr tablet 50 mg, 50 mg, Oral, Q24H, Montez Dan MD    Morphine sulfate (PF) injection 2 mg, 2 mg, Intravenous, Once per day on Tue Fri, Montez Dan MD    OLANZapine (zyPREXA) tablet 5 mg, 5 mg, Oral, Nightly PRN, Montez Dan MD    ondansetron (ZOFRAN)  "tablet 4 mg, 4 mg, Oral, Q6H PRN **OR** ondansetron (ZOFRAN) injection 4 mg, 4 mg, Intravenous, Q6H PRN, Montez Dan MD    oxyCODONE (ROXICODONE) immediate release tablet 7.5 mg, 7.5 mg, Oral, Q6H PRN, Montez Dan MD    pantoprazole (PROTONIX) EC tablet 40 mg, 40 mg, Oral, BID, Montez Dan MD    polyethylene glycol (MIRALAX) packet 17 g, 17 g, Oral, BID, Montez Dan MD    sennosides-docusate (PERICOLACE) 8.6-50 MG per tablet 2 tablet, 2 tablet, Oral, BID, Montez Dan MD    simethicone (MYLICON) chewable tablet 80 mg, 80 mg, Oral, 4x Daily, Montez Dan MD    sodium chloride nasal spray 2 spray, 2 spray, Each Nare, PRN, Marilee Botello APRN    sodium hypochlorite (HYSEPT) 0.25 % topical solution, , Topical, Q12H, Montez Dan MD    traZODone (DESYREL) tablet 150 mg, 150 mg, Oral, Nightly, Montez Dan MD    vitamin D3 tablet 5,000 Units, 5,000 Units, Oral, Daily, Montez Dan MD    Data:     Code Status:    There are no questions and answers to display.       Family History   Problem Relation Age of Onset    Breast cancer Neg Hx     Ovarian cancer Neg Hx     Uterine cancer Neg Hx     Colon cancer Neg Hx     Melanoma Neg Hx        Social History     Socioeconomic History    Marital status:    Tobacco Use    Smoking status: Every Day     Packs/day: 1.00     Types: Cigarettes    Smokeless tobacco: Never   Substance and Sexual Activity    Alcohol use: Never    Drug use: Never    Sexual activity: Yes     Partners: Male     Birth control/protection: Surgical       Vitals:  /59   Ht 172.7 cm (68\")   Wt 88.8 kg (195 lb 12.8 oz)   BMI 29.77 kg/m²   T 97.9 P 61 R 14 /87 Sp02 94% (room air)            I/O (24Hr):  No intake or output data in the 24 hours ending 09/11/23 1229    Labs and imaging:      Recent Results (from the past 12 hour(s))   Basic Metabolic Panel    Collection Time: 09/11/23  3:46 AM    " Specimen: Blood   Result Value Ref Range    Glucose 130 (H) 65 - 99 mg/dL    BUN 7 (L) 8 - 23 mg/dL    Creatinine 0.35 (L) 0.57 - 1.00 mg/dL    Sodium 140 136 - 145 mmol/L    Potassium 4.2 3.5 - 5.2 mmol/L    Chloride 100 98 - 107 mmol/L    CO2 32.0 (H) 22.0 - 29.0 mmol/L    Calcium 10.3 8.6 - 10.5 mg/dL    BUN/Creatinine Ratio 20.0 7.0 - 25.0    Anion Gap 8.0 5.0 - 15.0 mmol/L    eGFR 116.5 >60.0 mL/min/1.73   Sedimentation Rate    Collection Time: 09/11/23  3:46 AM    Specimen: Blood   Result Value Ref Range    Sed Rate 54 (H) 0 - 30 mm/hr   C-reactive Protein    Collection Time: 09/11/23  3:46 AM    Specimen: Blood   Result Value Ref Range    C-Reactive Protein 2.07 (H) 0.00 - 0.50 mg/dL   CBC Auto Differential    Collection Time: 09/11/23  3:46 AM    Specimen: Blood   Result Value Ref Range    WBC 6.91 3.40 - 10.80 10*3/mm3    RBC 3.49 (L) 3.77 - 5.28 10*6/mm3    Hemoglobin 10.0 (L) 12.0 - 15.9 g/dL    Hematocrit 33.6 (L) 34.0 - 46.6 %    MCV 96.3 79.0 - 97.0 fL    MCH 28.7 26.6 - 33.0 pg    MCHC 29.8 (L) 31.5 - 35.7 g/dL    RDW 14.9 12.3 - 15.4 %    RDW-SD 52.1 37.0 - 54.0 fl    MPV 10.0 6.0 - 12.0 fL    Platelets 398 140 - 450 10*3/mm3    Neutrophil % 60.0 42.7 - 76.0 %    Lymphocyte % 25.5 19.6 - 45.3 %    Monocyte % 8.5 5.0 - 12.0 %    Eosinophil % 4.9 0.3 - 6.2 %    Basophil % 0.4 0.0 - 1.5 %    Immature Grans % 0.7 (H) 0.0 - 0.5 %    Neutrophils, Absolute 4.14 1.70 - 7.00 10*3/mm3    Lymphocytes, Absolute 1.76 0.70 - 3.10 10*3/mm3    Monocytes, Absolute 0.59 0.10 - 0.90 10*3/mm3    Eosinophils, Absolute 0.34 0.00 - 0.40 10*3/mm3    Basophils, Absolute 0.03 0.00 - 0.20 10*3/mm3    Immature Grans, Absolute 0.05 0.00 - 0.05 10*3/mm3    nRBC 0.0 0.0 - 0.2 /100 WBC                       Physical Examination:        Physical Exam  Vitals and nursing note reviewed.   Constitutional:       Appearance: Normal appearance.   HENT:      Head: Normocephalic and atraumatic.      Right Ear: External ear normal.      Left  Ear: External ear normal.      Nose: Nose normal.      Mouth/Throat:      Mouth: Mucous membranes are moist.      Pharynx: Oropharynx is clear.   Eyes:      Extraocular Movements: Extraocular movements intact.      Conjunctiva/sclera: Conjunctivae normal.      Pupils: Pupils are equal, round, and reactive to light.   Neck:      Comments: Dressing c.d.I.  Cardiovascular:      Rate and Rhythm: Normal rate and regular rhythm.      Pulses: Normal pulses.      Heart sounds: Normal heart sounds.   Pulmonary:      Effort: Pulmonary effort is normal.      Breath sounds: Normal breath sounds.   Abdominal:      General: Bowel sounds are normal.      Palpations: Abdomen is soft.   Musculoskeletal:      Comments: Generalized weakness   Skin:     Comments: Wound vac intact   Neurological:      Mental Status: She is alert and oriented to person, place, and time.      Comments: Intention tremor - worse to LUE   Psychiatric:         Mood and Affect: Mood normal.         Behavior: Behavior normal.         Assessment:             Acute tracheostomy management    Acute on chronic respiratory failure with hypoxia and hypercapnia    Past Medical History:   Diagnosis Date    Arthritis     Interstitial cystitis     Macular dystrophy     Neuropathy     Restless leg syndrome         Plan:        Acute hypoxic respiratory failure s/p tracheostomy tube  PAF  Acute RUE DVT  Chronic anticoagulation  Multifactorial anemia  Peripheral neuropathy  DM2 with hyperglycemia not on long term insulin  HTN  Anxiety  GERD  Constipation  Unstageable decubitus ulcer to coccyx  Continue current treatment. Monitor counts. Increase activity. Labs Thursday.  Wound care per wound care team. Aggressive therapies as tolerated. Maintain patient safety.  Continue pain management efforts. Encourage increased participation with therapies.       Electronically signed by BHAVESH Mills on 9/11/2023 at 12:29 CDT     I have discussed the care of Emili Carrizales  Keshia, including pertinent history and exam findings, with the nurse practitioner.    I have seen and examined the patient and the key elements of all parts of the encounter have been performed by me.  I agree with the assessment, plan and orders as documented by BHAVESH Sheehan, after I modified the exam findings and the plan of treatments and the final version is my approved version of the assessment.        Electronically signed by Montez Dan MD on 9/12/2023 at 07:16 CDT

## 2023-09-12 PROCEDURE — 97530 THERAPEUTIC ACTIVITIES: CPT

## 2023-09-12 PROCEDURE — 25010000002 MORPHINE PER 10 MG: Performed by: INTERNAL MEDICINE

## 2023-09-12 PROCEDURE — 25010000002 HYDROMORPHONE PER 4 MG: Performed by: INTERNAL MEDICINE

## 2023-09-12 PROCEDURE — 25010000002 ONDANSETRON PER 1 MG: Performed by: INTERNAL MEDICINE

## 2023-09-12 RX ORDER — MORPHINE SULFATE 2 MG/ML
2 INJECTION, SOLUTION INTRAMUSCULAR; INTRAVENOUS
Status: DISCONTINUED | OUTPATIENT
Start: 2023-09-12 | End: 2023-09-13 | Stop reason: HOSPADM

## 2023-09-12 NOTE — PROGRESS NOTES
ANISH Carbajal APRN        Internal Medicine Progress Note    9/12/2023   12:09 CDT    Name:  Emili Schmitt  MRN:    [id number]     Acct:     [id number]   Room:  [address] [place] [address] Day: 0     Admit Date: 8/21/2023  6:54 PM  PCP: Remeidos Qiu APRN    Subjective:     C/C: need for continued rehabilitation    Interval History: Status:  stayed the same. Resting in bed. No family at bedside. Afebrile. Maintaining adequate 02 sats on room air. Progressing slowly with therapies. Pain well controlled at present.     Review of Systems   Constitutional: Positive for malaise/fatigue. Negative for chills, decreased appetite, weight gain and weight loss.   HENT:  Negative for congestion, ear discharge, hoarse voice and tinnitus.    Eyes:  Negative for blurred vision, discharge, visual disturbance and visual halos.   Cardiovascular:  Positive for dyspnea on exertion. Negative for chest pain, claudication, irregular heartbeat, leg swelling, orthopnea and paroxysmal nocturnal dyspnea.   Respiratory:  Positive for cough and shortness of breath. Negative for sputum production and wheezing.    Endocrine: Negative for cold intolerance, heat intolerance and polyuria.   Hematologic/Lymphatic: Negative for adenopathy. Does not bruise/bleed easily.   Skin:  Positive for poor wound healing. Negative for dry skin, itching and suspicious lesions.   Musculoskeletal:  Negative for arthritis, back pain, falls, joint pain, muscle weakness and myalgias.   Gastrointestinal:  Negative for abdominal pain, constipation, diarrhea, dysphagia and hematemesis.   Genitourinary:  Negative for bladder incontinence, dysuria and frequency.   Neurological:  Positive for weakness. Negative for aphonia, disturbances in coordination and dizziness.   Psychiatric/Behavioral:  Negative for altered mental status, depression, memory loss and substance abuse. The patient does not have insomnia and is not nervous/anxious.         Medications:     Allergies:   Allergies   Allergen Reactions    Hydrocodone Itching     Severe itching    Tylenol [Acetaminophen] Itching       Current Meds:   Current Facility-Administered Medications:     albuterol (PROVENTIL) nebulizer solution 0.083% 2.5 mg/3mL, 2.5 mg, Nebulization, Q6H PRN, Montez Dan MD    ALPRAZolam (XANAX) tablet 0.25 mg, 0.25 mg, Oral, BID PRN, Montez Dan MD    amiodarone (PACERONE) tablet 200 mg, 200 mg, Oral, Q24H, Montez Dan MD    apixaban (ELIQUIS) tablet 5 mg, 5 mg, Oral, Q12H, Montez Dan MD    bisacodyl (DULCOLAX) suppository 10 mg, 10 mg, Rectal, Daily PRN, Montez Dan MD    busPIRone (BUSPAR) tablet 5 mg, 5 mg, Oral, TID With Meals, Montez Dan MD    DULoxetine (CYMBALTA) DR capsule 60 mg, 60 mg, Oral, Daily, Montez Dan MD    First Mouthwash (Magic Mouthwash) 5 mL, 5 mL, Swish & Spit, TID PRN, Valencia Arias APRN    gabapentin (NEURONTIN) capsule 200 mg, 200 mg, Oral, TID, Montez Dan MD    hydrALAZINE (APRESOLINE) injection 5 mg, 5 mg, Intravenous, Q4H PRN, Montez Dan MD    HYDROmorphone (DILAUDID) injection 0.5 mg, 0.5 mg, Intravenous, Q12H PRN, Montez Dan MD    ipratropium-albuterol (DUO-NEB) nebulizer solution 3 mL, 3 mL, Nebulization, Q6H PRN, Montez Dan MD    lidocaine (LIDODERM) 5 % 2 patch, 2 patch, Transdermal, Q24H, Montez Dan MD    metoprolol succinate XL (TOPROL-XL) 24 hr tablet 50 mg, 50 mg, Oral, Q24H, Montez Dan MD    Morphine sulfate (PF) injection 2 mg, 2 mg, Intravenous, Once per day on Tue Fri, Montez Dan MD    OLANZapine (zyPREXA) tablet 5 mg, 5 mg, Oral, Nightly PRN, Montez Dan MD    ondansetron (ZOFRAN) tablet 4 mg, 4 mg, Oral, Q6H PRN **OR** ondansetron (ZOFRAN) injection 4 mg, 4 mg, Intravenous, Q6H PRN, Montez Dan MD    oxyCODONE (ROXICODONE) immediate release  "tablet 7.5 mg, 7.5 mg, Oral, Q6H PRN, Montez Dan MD    pantoprazole (PROTONIX) EC tablet 40 mg, 40 mg, Oral, BID, Montez Dan MD    polyethylene glycol (MIRALAX) packet 17 g, 17 g, Oral, BID, Montez Dan MD    sennosides-docusate (PERICOLACE) 8.6-50 MG per tablet 2 tablet, 2 tablet, Oral, BID, Montez Dan MD    simethicone (MYLICON) chewable tablet 80 mg, 80 mg, Oral, 4x Daily, Montez Dan MD    sodium chloride nasal spray 2 spray, 2 spray, Each Nare, PRN, Marilee Botello, APRVINNIE    sodium hypochlorite (HYSEPT) 0.25 % topical solution, , Topical, Q12H, Montez Dan MD    traZODone (DESYREL) tablet 150 mg, 150 mg, Oral, Nightly, Montez Dan MD    vitamin D3 tablet 5,000 Units, 5,000 Units, Oral, Daily, Montez Dan MD    Data:     Code Status:    There are no questions and answers to display.       Family History   Problem Relation Age of Onset    Breast cancer Neg Hx     Ovarian cancer Neg Hx     Uterine cancer Neg Hx     Colon cancer Neg Hx     Melanoma Neg Hx        Social History     Socioeconomic History    Marital status:    Tobacco Use    Smoking status: Every Day     Packs/day: 1.00     Types: Cigarettes    Smokeless tobacco: Never   Substance and Sexual Activity    Alcohol use: Never    Drug use: Never    Sexual activity: Yes     Partners: Male     Birth control/protection: Surgical       Vitals:  /59   Ht 172.7 cm (68\")   Wt 88.8 kg (195 lb 12.8 oz)   BMI 29.77 kg/m²   T 98.1 P 65 R 13 BP 86/70 Sp02 92% (room air)            I/O (24Hr):  No intake or output data in the 24 hours ending 09/12/23 1209    Labs and imaging:      No results found for this or any previous visit (from the past 12 hour(s)).                      Physical Examination:        Physical Exam  Vitals and nursing note reviewed.   Constitutional:       Appearance: Normal appearance.   HENT:      Head: Normocephalic and atraumatic.      " Right Ear: External ear normal.      Left Ear: External ear normal.      Nose: Nose normal.      Mouth/Throat:      Mouth: Mucous membranes are moist.      Pharynx: Oropharynx is clear.   Eyes:      Extraocular Movements: Extraocular movements intact.      Conjunctiva/sclera: Conjunctivae normal.      Pupils: Pupils are equal, round, and reactive to light.   Neck:      Comments: Dressing c.d.I.  Cardiovascular:      Rate and Rhythm: Normal rate and regular rhythm.      Pulses: Normal pulses.      Heart sounds: Normal heart sounds.   Pulmonary:      Effort: Pulmonary effort is normal.      Breath sounds: Normal breath sounds.   Abdominal:      General: Bowel sounds are normal.      Palpations: Abdomen is soft.   Musculoskeletal:      Comments: Generalized weakness   Skin:     Comments: Wound vac intact   Neurological:      Mental Status: She is alert and oriented to person, place, and time.      Comments: Intention tremor - worse to LUE   Psychiatric:         Mood and Affect: Mood normal.         Behavior: Behavior normal.         Assessment:             Acute tracheostomy management    Acute on chronic respiratory failure with hypoxia and hypercapnia    Past Medical History:   Diagnosis Date    Arthritis     Interstitial cystitis     Macular dystrophy     Neuropathy     Restless leg syndrome         Plan:        Acute hypoxic respiratory failure s/p tracheostomy tube  PAF  Acute RUE DVT  Chronic anticoagulation  Multifactorial anemia  Peripheral neuropathy  DM2 with hyperglycemia not on long term insulin  HTN  Anxiety  GERD  Constipation  Unstageable decubitus ulcer to coccyx  Continue current treatment. Monitor counts. Increase activity. Labs Thursday.  Wound care per wound care team. Aggressive therapies as tolerated. Maintain patient safety.  Continue pain management efforts. Encourage increased participation with therapies. Discharge planning.       Electronically signed by BHAVESH Mills on 9/12/2023  at 12:09 CDT     I have discussed the care of Emili Schmitt, including pertinent history and exam findings, with the nurse practitioner.    I have seen and examined the patient and the key elements of all parts of the encounter have been performed by me.  I agree with the assessment, plan and orders as documented by BHAVESH Sheehan, after I modified the exam findings and the plan of treatments and the final version is my approved version of the assessment.        Electronically signed by Montez Dan MD on 9/12/2023 at 20:58 CDT

## 2023-09-12 NOTE — PROGRESS NOTES
[facility] Fall Risk Assessment Note    Name: Emili Schmitt  MRN: [id number]  [facility]: [id number]  Admit Date/Time: 8/21/2023  6:54 PM.    Currently ordered medications associated with an increased risk for fall include:    Scheduled Meds:  amiodarone, 200 mg, Oral, Q24H  busPIRone, 5 mg, Oral, TID With Meals  DULoxetine, 60 mg, Oral, Daily  gabapentin, 200 mg, Oral, TID  metoprolol succinate XL, 50 mg, Oral, Q24H  Morphine, 2 mg, Intravenous, Once per day on Tue Fri  polyethylene glycol, 17 g, Oral, BID  senna-docusate sodium, 2 tablet, Oral, BID  traZODone, 150 mg, Oral, Nightly    PRN Meds:    ALPRAZolam    bisacodyl    hydrALAZINE    HYDROmorphone    OLANZapine    ondansetron **OR** ondansetron    oxyCODONE    Flaco Naranjo, PharmD  09/12/23 15:59 CDT

## 2023-09-13 PROCEDURE — 25010000002 HYDROMORPHONE PER 4 MG: Performed by: INTERNAL MEDICINE

## 2023-09-13 PROCEDURE — 97530 THERAPEUTIC ACTIVITIES: CPT

## 2023-09-13 PROCEDURE — 97110 THERAPEUTIC EXERCISES: CPT

## 2023-09-13 PROCEDURE — 25010000002 ONDANSETRON PER 1 MG: Performed by: INTERNAL MEDICINE

## 2023-09-13 NOTE — DISCHARGE SUMMARY
ANISH Carbajal APRN      Internal Medicine Discharge Summary    Patient ID: Emili Schmitt  MRN: [id number]     Acct:  [id number]       Patient's PCP: Remedios Qiu APRN    Admit Date: 8/21/2023     Discharge Date:   9/13/23    Admitting Physician: Montez Dan MD    Discharge Physician: BHAVESH Mills     Active Discharge Diagnoses:       Acute hypoxic respiratory failure s/p tracheostomy tube  PAF  Acute RUE DVT  Chronic anticoagulation  Multifactorial anemia  Peripheral neuropathy  DM2 with hyperglycemia not on long term insulin  HTN  Anxiety  GERD  Constipation  Unstageable decubitus ulcer to coccyx  Past Medical History:   Diagnosis Date    Arthritis     Interstitial cystitis     Macular dystrophy     Neuropathy     Restless leg syndrome        The patient was seen and examined on the day of discharge and this discharge summary is in conjunction with any daily progress note from day of discharge.    Code Status:    There are no questions and answers to display.       Hospital Course: Emili Schmitt is a  62 y.o.  female who presents with need for continued oxygen weaning and rehabilitation efforts following a recent acute care stay. The patient had been in her usual state of health when she underwent laparoscopic repair of recurrent paraesophageal hernia with incisionless fundoplasty and lysis of adhesions on 7/5/23 at [facility]. Patient returned to ER 10 days post-op with complaints of chest pain radiating to shoulder and associated with increased shortness of breath. She was found to have evidence of mediastinitis with pneumonia and pleural effusion. She underwent left chest tube placement and during EGD on 7/14, patient had esophageal stent placed. She became febrile with worsening respiratory function and required intubation with mechanical ventilation. On 7/19, she transferred to [facility] ([place], [place])  for higher level of care. Pleural fluid culture returned positive for candida glabrata. A second chest tube was placed on 7/20 for worsening pleural effusion. Patient continued with mechanical ventilation and required TPN for nutrition support. This was eventually transitioned to tube feeding via OG/NGT. Paralytics and sedation were weaned, but patient had decline in respiratory status necessitating continued paralytic and sedative therapy. She developed atrial fibrillation with RVR and was treated with cardizem and amiodarone drips as well as heparin drip that was later transitioned to anticoagulation with Eliquis. Amiodarone was discontinued after patient developed significant bradycardia.  Due to significant hyperglycemia, she also required insulin drip. She had issues with constipation as well as nausea and vomiting requiring enemas and gastric decompression. She was unable to tolerated sedation and vent liberation and underwent tracheostomy tube placement on 8/8. She was noted to have significant edema to RLE and venous doppler confirmed DVT. Patient stabilized somewhat and chest tubes were discontinued. She developed a large unstageable decubitus ulcer to the coccyx and was seen in consultation by wound care. She tolerated oxygen weaning and is maintaining adequate 02 sats per trach collar. She completed antibiotic and antifungal therapy. She underwent barium swallow that showed no signs of esophageal leak and esophageal stent was removed on 8/21. She transferred to our facility for continued oxygen weaning, rehabilitation efforts and wound care.   While at our facility, the patient was seen in consultation for trach management. Initially, she required trach collar with 28% 02 and was voicing well with PMV. She had significant anemia with hemoglobin down to 6.8 requiring transfusion of blood products. She was seen in consultation by our wound care team and underwent debridement of decubitus ulcer that was  present on admission. She continued with routine wound care with wound vac changes. She underwent bedside flexible laryngoscopy with trach change per ENT on 8/25 and she tolerated trach capping with supplemental oxygen delivered via nasal cannula. Following repeat bedside flexible laryngoscopy per ENT on 9/5, the patient was decannulated. She has since weaned to room air and is maintaining adequate 02 sats. She had an episode of increased substernal chest pain with increased nausea and diaphoresis. This was associated with periods of increased anxiety. Workup essentially within normal limits and cardiology was consulted who recommended outpatient evaluation. The patient has had increased participation and progress with therapies and her wound is showing signs of improvement. She's now felt stable for discharge to swing bed for continued wound care and rehabilitation efforts prior to her planned return to home.       Consults:  Dr. Velázquez (ENT)  Dr. Sharma (cardiology)    Disposition:  Swing Bed    Discharged Condition: Stable    Physical Exam  Vitals and nursing note reviewed.   Constitutional:       Appearance: Normal appearance.   HENT:      Head: Normocephalic and atraumatic.      Right Ear: External ear normal.      Left Ear: External ear normal.      Nose: Nose normal.      Mouth/Throat:      Mouth: Mucous membranes are moist.      Pharynx: Oropharynx is clear.   Eyes:      Extraocular Movements: Extraocular movements intact.      Conjunctiva/sclera: Conjunctivae normal.      Pupils: Pupils are equal, round, and reactive to light.   Neck:      Comments: Dressing c.d.I.  Cardiovascular:      Rate and Rhythm: Normal rate and regular rhythm.      Pulses: Normal pulses.      Heart sounds: Normal heart sounds.   Pulmonary:      Effort: Pulmonary effort is normal.      Breath sounds: Normal breath sounds.   Abdominal:      General: Bowel sounds are normal.      Palpations: Abdomen is soft.   Musculoskeletal:       Comments: Generalized weakness   Skin:     Comments: Wound vac intact   Neurological:      Mental Status: She is alert and oriented to person, place, and time.      Comments: Intention tremor - worse to LUE   Psychiatric:         Mood and Affect: Mood normal.         Behavior: Behavior normal.     Follow Up: PCP 1 week after discharge from swing bed  Dr. Sharma 2-4 weeks after discharge from swing bed    Diet: Diet: Regular/House Diet; Texture: Soft to Chew (NDD 3); Soft to Chew: Chopped Meat; Fluid Consistency: Thin (IDDSI 0)    Discharge Medications:   See computer generated medication reconciliation form      Time Spent on discharge is  32 minutes in patient examination, evaluation, patient/family counseling as well as medication reconciliation, prescriptions for required medications, discharge plan and follow up.     Electronically signed by BHAVESH Mills on 9/13/2023 at 10:03 CDT     I have discussed the care of Emili Schmitt, including pertinent history and exam findings, with the nurse practitioner.    I have seen and examined the patient and the key elements of all parts of the encounter have been performed by me.  I agree with the assessment, plan and orders as documented by BHAVESH Sheehan, after I modified the exam findings and the plan of treatments and the final version is my approved version of the assessment.        Electronically signed by Montez Dan MD on 9/13/2023 at 21:20 CDT

## 2024-01-08 ENCOUNTER — TELEPHONE (OUTPATIENT)
Dept: NEUROLOGY | Facility: CLINIC | Age: 64
End: 2024-01-08
Payer: COMMERCIAL

## 2024-01-09 ENCOUNTER — OFFICE VISIT (OUTPATIENT)
Dept: NEUROLOGY | Facility: CLINIC | Age: 64
End: 2024-01-09
Payer: MEDICAID

## 2024-01-09 VITALS
RESPIRATION RATE: 18 BRPM | HEART RATE: 68 BPM | WEIGHT: 186 LBS | BODY MASS INDEX: 28.19 KG/M2 | DIASTOLIC BLOOD PRESSURE: 74 MMHG | HEIGHT: 68 IN | SYSTOLIC BLOOD PRESSURE: 124 MMHG

## 2024-01-09 DIAGNOSIS — G25.0 ESSENTIAL TREMOR: Primary | ICD-10-CM

## 2024-01-09 PROCEDURE — 1159F MED LIST DOCD IN RCRD: CPT | Performed by: PSYCHIATRY & NEUROLOGY

## 2024-01-09 PROCEDURE — 99204 OFFICE O/P NEW MOD 45 MIN: CPT | Performed by: PSYCHIATRY & NEUROLOGY

## 2024-01-09 PROCEDURE — 1160F RVW MEDS BY RX/DR IN RCRD: CPT | Performed by: PSYCHIATRY & NEUROLOGY

## 2024-01-09 RX ORDER — PANTOPRAZOLE SODIUM 40 MG/1
40 TABLET, DELAYED RELEASE ORAL DAILY
COMMUNITY

## 2024-01-09 RX ORDER — AMIODARONE HYDROCHLORIDE 200 MG/1
200 TABLET ORAL DAILY
COMMUNITY

## 2024-01-09 RX ORDER — AMOXICILLIN 250 MG
1 CAPSULE ORAL DAILY
COMMUNITY

## 2024-01-09 RX ORDER — PROPRANOLOL HYDROCHLORIDE 40 MG/1
40 TABLET ORAL 3 TIMES DAILY
Qty: 90 TABLET | Refills: 2 | Status: SHIPPED | OUTPATIENT
Start: 2024-01-09 | End: 2024-04-08

## 2024-01-09 RX ORDER — TRAZODONE HYDROCHLORIDE 150 MG/1
150 TABLET ORAL NIGHTLY
COMMUNITY

## 2024-01-09 NOTE — PROGRESS NOTES
Subjective        Emili Schmitt presents to [facility]    History of Present Illness  The patient is a 63-year-old female with history of GERD, atrial fibrillation, and was referred for a tremor.    She has dealt with the tremor for a long time. It has worsened since she was hospitalized for 3 to 4 days at [facility]. She was transported to [facility] in [place], returned to [facility] for rehabilitation, and then went to [facility]. She was unable to feed herself when she initially returned home.     Her current problems consist of atrial fibrillation and other cardiac problems, which were not present prior to her recent illness. She tapered herself off the antidepressant that she took for anxiety because she read that it enhanced tremors. This was prior to her illness, and since restarting the medication, she has not noticed improvement.     Past Medical History:   Diagnosis Date    Arthritis     Interstitial cystitis     Macular dystrophy     Neuropathy     Restless leg syndrome           Current Outpatient Medications:     amiodarone (PACERONE) 200 MG tablet, Take 1 tablet by mouth Daily., Disp: , Rfl:     apixaban (ELIQUIS) 5 MG tablet tablet, Take 1 tablet by mouth 2 (Two) Times a Day., Disp: , Rfl:     DULoxetine (CYMBALTA) 60 MG capsule, , Disp: , Rfl:     metoprolol tartrate (LOPRESSOR) 50 MG tablet, Take 1 tablet by mouth 2 (Two) Times a Day., Disp: , Rfl:     pantoprazole (PROTONIX) 40 MG EC tablet, Take 1 tablet by mouth Daily., Disp: , Rfl:     sennosides-docusate (senna-docusate sodium) 8.6-50 MG per tablet, Take 1 tablet by mouth Daily. TAke 2 tabs daily, Disp: , Rfl:     traZODone (DESYREL) 150 MG tablet, Take 1 tablet by mouth Every Night., Disp: , Rfl:     vitamin D3 (Vitamin D) 125 MCG (5000 UT) capsule capsule, Take 1 capsule by mouth Daily., Disp: , Rfl:     diclofenac (VOLTAREN) 75 MG EC tablet, Take 1  "tablet by mouth 2 (Two) Times a Day. (Patient not taking: Reported on 1/9/2024), Disp: 60 tablet, Rfl: 1       Objective   Vital Signs:   /74 (BP Location: Left arm, Patient Position: Sitting)   Pulse 68   Resp 18   Ht 172.7 cm (68\")   Wt 84.4 kg (186 lb)   BMI 28.28 kg/m²     Physical Exam  Constitutional:       General: She is awake.   Eyes:      Extraocular Movements: Extraocular movements intact.   Neurological:      Mental Status: She is alert.   Psychiatric:         Speech: Speech normal.      Neurological Exam  Mental Status  Awake and alert. Speech is normal. Language is fluent with no aphasia.    Cranial Nerves  CN III, IV, VI: Extraocular movements intact bilaterally.  CN VII: Full and symmetric facial movement.    Motor    Postural tremor with arms outstretched, somewhat jerky/dystonic  Tremor with FNF  Minimal rest tremor  Some jumping of feet/legs  Chin/Head tremor  No bradykinesia or rigidity .    Gait  Casual gait is normal including stance, stride, and arm swing.      Result Review :                     Assessment and Plan   The patient is a 63-year-old female referred for tremor, likely an essential tremor, especially as she has known family history of it.    She had significant worsening after significant hospitalization last year, 2023, after a surgery in which she had hypoxic respiratory failure aside from the stress that it caused on her body. There is not a clear other reason that it worsened at that time. She has been put on amiodarone since then, as she had new onset atrial fibrillation that certainly can be a side effect, but even if it is, it is not something that can be changed. She previously had benefit with metoprolol, but now no longer works. We are going to try propranolol first. I cannot try primidone because it interacts with amiodarone. I cannot try Topamax because of its interaction with amiodarone. We could certainly try addition of gabapentin in the future. I also " discussed other options including Botox for her head tremor.     Plan:  1. She will discontinue metoprolol.   2. She will start propranolol 40 mg twice daily. If tolerating, she can increase to 40 mg 3 times daily.  3. The patient will follow up in 6 weeks. She will call sooner with any issues.        Follow Up   No follow-ups on file.  Patient was given instructions and counseling regarding her condition or for health maintenance advice. Please see specific information pulled into the AVS if appropriate.     Transcribed from ambient dictation for Marisol Bartholomew MD by Maria D Mckeon.  01/09/24   12:48 CST    Patient or patient representative verbalized consent to the visit recording.  I have personally performed the services described in this document as transcribed by the above individual, and it is both accurate and complete.

## 2024-01-10 ENCOUNTER — TELEPHONE (OUTPATIENT)
Dept: NEUROLOGY | Facility: CLINIC | Age: 64
End: 2024-01-10
Payer: COMMERCIAL

## 2024-01-10 DIAGNOSIS — G25.0 ESSENTIAL TREMOR: ICD-10-CM

## 2024-01-10 RX ORDER — PROPRANOLOL HYDROCHLORIDE 40 MG/1
40 TABLET ORAL 3 TIMES DAILY
Qty: 90 TABLET | Refills: 2 | Status: CANCELLED | OUTPATIENT
Start: 2024-01-10 | End: 2024-04-09

## 2024-01-10 NOTE — TELEPHONE ENCOUNTER
Caller: Emili Schmitt    Relationship: Self    Best call back number: [phone]    Requested Prescriptions:   Requested Prescriptions     Pending Prescriptions Disp Refills    propranolol (INDERAL) 40 MG tablet 90 tablet 2     Sig: Take 1 tablet by mouth 3 (Three) Times a Day for 90 days.        Pharmacy where request should be sent:  [facility] [place] [address] [facility] [phone] [facility] [phone]      Last office visit with prescribing clinician: 1/9/2024   Last telemedicine visit with prescribing clinician: Visit date not found   Next office visit with prescribing clinician: 2/19/2024     Additional details provided by patient: PATIENT TELEPHONED TO ADVISE RX WAS TO BE SENT TO  PHARMACY.    PLEASE RESEND/UPDATE RX OR CALL TO ADVISE-THANK YOU     Does the patient have less than a 3 day supply:  [] Yes  [x] No    Would you like a call back once the refill request has been completed: [] Yes [x] No    If the office needs to give you a call back, can they leave a voicemail: [] Yes [x] No    Omar Luque Rep   01/10/24 09:05 CST

## 2024-01-10 NOTE — TELEPHONE ENCOUNTER
Emili said they don't use [facility].  Explained that I will remove it from her chart and ask [facility] to contact [facility] and transfer the script.

## 2024-04-08 ENCOUNTER — OFFICE VISIT (OUTPATIENT)
Dept: NEUROLOGY | Facility: CLINIC | Age: 64
End: 2024-04-08
Payer: MEDICAID

## 2024-04-08 VITALS
HEIGHT: 68 IN | WEIGHT: 190 LBS | BODY MASS INDEX: 28.79 KG/M2 | SYSTOLIC BLOOD PRESSURE: 120 MMHG | DIASTOLIC BLOOD PRESSURE: 82 MMHG

## 2024-04-08 DIAGNOSIS — G25.0 ESSENTIAL TREMOR: Primary | ICD-10-CM

## 2024-04-08 PROCEDURE — 1160F RVW MEDS BY RX/DR IN RCRD: CPT | Performed by: PSYCHIATRY & NEUROLOGY

## 2024-04-08 PROCEDURE — 1159F MED LIST DOCD IN RCRD: CPT | Performed by: PSYCHIATRY & NEUROLOGY

## 2024-04-08 PROCEDURE — 99214 OFFICE O/P EST MOD 30 MIN: CPT | Performed by: PSYCHIATRY & NEUROLOGY

## 2024-04-08 RX ORDER — BISACODYL 10 MG
SUPPOSITORY, RECTAL RECTAL EVERY 24 HOURS
COMMUNITY

## 2024-04-08 RX ORDER — GABAPENTIN 300 MG/1
300 CAPSULE ORAL 2 TIMES DAILY
Qty: 60 CAPSULE | Refills: 2 | Status: SHIPPED | OUTPATIENT
Start: 2024-04-08 | End: 2024-07-07

## 2024-04-08 RX ORDER — ALBUTEROL SULFATE 0.63 MG/3ML
SOLUTION RESPIRATORY (INHALATION)
COMMUNITY

## 2024-04-08 RX ORDER — ALBUTEROL SULFATE 90 UG/1
AEROSOL, METERED RESPIRATORY (INHALATION)
COMMUNITY
Start: 2023-06-28

## 2024-04-08 RX ORDER — ASCORBIC ACID 500 MG
500 TABLET ORAL 2 TIMES DAILY
COMMUNITY
Start: 2024-03-25

## 2024-04-08 RX ORDER — ONDANSETRON 4 MG/1
TABLET, ORALLY DISINTEGRATING ORAL EVERY 24 HOURS
COMMUNITY

## 2024-04-08 RX ORDER — METOPROLOL SUCCINATE 50 MG/1
50 TABLET, EXTENDED RELEASE ORAL DAILY
COMMUNITY

## 2024-04-08 NOTE — PROGRESS NOTES
"  Subjective        Emili Schmitt presents to [facility]    History of Present Illness  63-year-old female here for follow-up.  We started her on propranolol at last visit but this caused side effects.  She is back on her metoprolol.  Tremors are worse.        Past Medical History:   Diagnosis Date    Arthritis     Atrial fibrillation     Carotid artery occlusion     Hyperlipidemia     Interstitial cystitis     Macular dystrophy     Neuropathy     Peripheral neuropathy     Restless leg syndrome           Current Outpatient Medications:     amiodarone (PACERONE) 200 MG tablet, Take 1 tablet by mouth Daily., Disp: , Rfl:     apixaban (ELIQUIS) 5 MG tablet tablet, Take 1 tablet by mouth 2 (Two) Times a Day., Disp: , Rfl:     diclofenac (VOLTAREN) 75 MG EC tablet, Take 1 tablet by mouth 2 (Two) Times a Day. (Patient not taking: Reported on 1/9/2024), Disp: 60 tablet, Rfl: 1    DULoxetine (CYMBALTA) 60 MG capsule, , Disp: , Rfl:     pantoprazole (PROTONIX) 40 MG EC tablet, Take 1 tablet by mouth Daily., Disp: , Rfl:     propranolol (INDERAL) 40 MG tablet, Take 1 tablet by mouth 3 (Three) Times a Day for 90 days., Disp: 90 tablet, Rfl: 2    sennosides-docusate (senna-docusate sodium) 8.6-50 MG per tablet, Take 1 tablet by mouth Daily. TAke 2 tabs daily, Disp: , Rfl:     traZODone (DESYREL) 150 MG tablet, Take 1 tablet by mouth Every Night., Disp: , Rfl:     vitamin D3 (Vitamin D) 125 MCG (5000 UT) capsule capsule, Take 1 capsule by mouth Daily., Disp: , Rfl:        Objective   Vital Signs:   Ht 172.7 cm (68\")   BMI 28.28 kg/m²     Physical Exam  Constitutional:       General: She is awake.   Eyes:      Extraocular Movements: Extraocular movements intact.   Neurological:      Mental Status: She is alert.   Psychiatric:         Speech: Speech normal.      Neurological Exam  Mental Status  Awake and alert. Speech is normal. Language is fluent with no aphasia.    Cranial Nerves  CN III, IV, " VI: Extraocular movements intact bilaterally.  CN VII: Full and symmetric facial movement.    Motor    Postural tremor with arms outstretched, somewhat jerky/dystonic  Tremor with FNF  Minimal rest tremor  Some jumping of feet/legs  Chin/Head tremor  No bradykinesia or rigidity .    Gait  Casual gait is normal including stance, stride, and arm swing.      Result Review :                     Assessment and Plan   The patient is a 63-year-old female referred for tremor, likely an essential tremor, especially as she has known family history of it.  She had significant worsening after significant hospitalization last year, 2023, after a surgery in which she had hypoxic respiratory failure aside from the stress that it caused on her body. There is not a clear other reason that it worsened at that time. She has been put on amiodarone since then, as she had new onset atrial fibrillation that certainly can be a side effect, but even if it is, it is not something that can be changed. She previously had benefit with metoprolol, but now no longer works.  We tried propranolol but this caused side effects, she is back on the metoprolol.  Primidone and Topamax are contraindicated with her amiodarone.  We discussed a trial of gabapentin.      Plan:    1.  Start gabapentin 300 mg twice daily.  Can increase in the future if needed.  2.  Follow-up 3 months.        Follow Up   No follow-ups on file.  Patient was given instructions and counseling regarding her condition or for health maintenance advice. Please see specific information pulled into the AVS if appropriate.

## 2024-06-24 ENCOUNTER — OFFICE VISIT (OUTPATIENT)
Dept: NEUROLOGY | Facility: CLINIC | Age: 64
End: 2024-06-24
Payer: COMMERCIAL

## 2024-06-24 VITALS
HEIGHT: 68 IN | SYSTOLIC BLOOD PRESSURE: 126 MMHG | HEART RATE: 61 BPM | WEIGHT: 200 LBS | BODY MASS INDEX: 30.31 KG/M2 | DIASTOLIC BLOOD PRESSURE: 78 MMHG | OXYGEN SATURATION: 97 %

## 2024-06-24 DIAGNOSIS — G25.0 ESSENTIAL TREMOR: Primary | ICD-10-CM

## 2024-06-24 PROCEDURE — 99214 OFFICE O/P EST MOD 30 MIN: CPT | Performed by: PSYCHIATRY & NEUROLOGY

## 2024-06-24 RX ORDER — GABAPENTIN 600 MG/1
600 TABLET ORAL 2 TIMES DAILY
Qty: 60 TABLET | Refills: 5 | Status: SHIPPED | OUTPATIENT
Start: 2024-06-24

## 2024-06-24 NOTE — PROGRESS NOTES
"  Subjective        Emili Schmitt presents to [facility]    History of Present Illness  63-year-old female here for follow-up.  Started gabapentin at last visit.  It helped especially in conjunction with her metoprolol.  However she was seen in the hospital since then and found to be bradycardic, so her metoprolol was decreased.  Her tremor has worsened since then.               Current Outpatient Medications:     albuterol (ACCUNEB) 0.63 MG/3ML nebulizer solution, as directed Inhalation, Disp: , Rfl:     albuterol sulfate HFA (ProAir HFA) 108 (90 Base) MCG/ACT inhaler, Inhale 2 inhalations 4 times a day by inhalation route as needed., Disp: , Rfl:     amiodarone (PACERONE) 200 MG tablet, Take 1 tablet by mouth Daily., Disp: , Rfl:     apixaban (ELIQUIS) 5 MG tablet tablet, Take 1 tablet by mouth 2 (Two) Times a Day., Disp: , Rfl:     DULoxetine (CYMBALTA) 60 MG capsule, Take 1 capsule by mouth Daily., Disp: , Rfl:     gabapentin (NEURONTIN) 300 MG capsule, Take 1 capsule by mouth 2 (Two) Times a Day for 90 days., Disp: 60 capsule, Rfl: 2    metoprolol succinate XL (TOPROL-XL) 50 MG 24 hr tablet, Take 0.5 tablets by mouth Daily., Disp: , Rfl:     ondansetron ODT (ZOFRAN-ODT) 4 MG disintegrating tablet, Place 1 tablet on the tongue Daily., Disp: , Rfl:     pantoprazole (PROTONIX) 40 MG EC tablet, Take 1 tablet by mouth Daily., Disp: , Rfl:     sennosides-docusate (senna-docusate sodium) 8.6-50 MG per tablet, Take 1 tablet by mouth Daily. TAke 2 tabs daily, Disp: , Rfl:     traZODone (DESYREL) 150 MG tablet, Take 1 tablet by mouth Every Night., Disp: , Rfl:     vitamin C (ASCORBIC ACID) 500 MG tablet, Take 1 tablet by mouth 2 (Two) Times a Day., Disp: , Rfl:     vitamin D3 (Vitamin D) 125 MCG (5000 UT) capsule capsule, Take 1 capsule by mouth Daily., Disp: , Rfl:        Objective   Vital Signs:   /78   Pulse 61   Ht 172.7 cm (68\")   Wt 90.7 kg (200 lb)   SpO2 97%   BMI " 30.41 kg/m²     Physical Exam  Constitutional:       General: She is awake.   Eyes:      Extraocular Movements: Extraocular movements intact.   Neurological:      Mental Status: She is alert.   Psychiatric:         Speech: Speech normal.      Neurological Exam  Mental Status  Awake and alert. Speech is normal. Language is fluent with no aphasia.    Cranial Nerves  CN III, IV, VI: Extraocular movements intact bilaterally.  CN VII: Full and symmetric facial movement.    Motor    Postural tremor with arms outstretched, somewhat jerky/dystonic  Tremor with FNF  Minimal rest tremor  Some jumping of feet/legs  Chin/Head tremor  No bradykinesia or rigidity .    Gait  Casual gait is normal including stance, stride, and arm swing.      Result Review :                     Assessment and Plan   The patient is a 63-year-old female referred for tremor, likely an essential tremor, especially as she has known family history of it.  She had significant worsening after significant hospitalization last year, 2023, after a surgery in which she had hypoxic respiratory failure aside from the stress that it caused on her body.  Adding gabapentin at last visit helped significantly in addition to her metoprolol.  However her metoprolol has now been reduced due to bradycardia, and her tremors have worsened.  Primidone and Topamax are contraindicated with her amiodarone.  Will increase gabapentin.  Consider amantadine next.    Plan:    1.  Increase gabapentin to 600 mg twice daily.  2.  Follow-up 3 months.        Follow Up   No follow-ups on file.  Patient was given instructions and counseling regarding her condition or for health maintenance advice. Please see specific information pulled into the AVS if appropriate.

## 2024-09-13 ENCOUNTER — TELEPHONE (OUTPATIENT)
Dept: NEUROLOGY | Facility: CLINIC | Age: 64
End: 2024-09-13
Payer: COMMERCIAL

## 2024-09-13 NOTE — TELEPHONE ENCOUNTER
I have spoke to patient to let her know that Dr. Bartholomew will not be in clinic on 9/24/2024 and that Dr. Puma De Jesus's MA will be calling her back next week to reschedule her appt.     CC

## 2024-09-16 ENCOUNTER — TELEPHONE (OUTPATIENT)
Dept: PULMONOLOGY | Facility: CLINIC | Age: 64
End: 2024-09-16
Payer: COMMERCIAL

## 2024-09-19 ENCOUNTER — OFFICE VISIT (OUTPATIENT)
Dept: PULMONOLOGY | Facility: CLINIC | Age: 64
End: 2024-09-19
Payer: COMMERCIAL

## 2024-09-19 VITALS
HEART RATE: 61 BPM | OXYGEN SATURATION: 98 % | DIASTOLIC BLOOD PRESSURE: 82 MMHG | HEIGHT: 68 IN | BODY MASS INDEX: 32.74 KG/M2 | WEIGHT: 216 LBS | SYSTOLIC BLOOD PRESSURE: 120 MMHG

## 2024-09-19 DIAGNOSIS — Z87.891 FORMER SMOKER: ICD-10-CM

## 2024-09-19 DIAGNOSIS — Z23 NEED FOR IMMUNIZATION AGAINST INFLUENZA: ICD-10-CM

## 2024-09-19 DIAGNOSIS — R06.83 SNORING: Chronic | ICD-10-CM

## 2024-09-19 DIAGNOSIS — R06.00 DYSPNEA, UNSPECIFIED TYPE: Primary | Chronic | ICD-10-CM

## 2024-09-19 DIAGNOSIS — J98.4 RESTRICTIVE LUNG DISEASE: ICD-10-CM

## 2024-09-19 DIAGNOSIS — Z98.890 HISTORY OF TRACHEOSTOMY: Chronic | ICD-10-CM

## 2024-09-19 RX ORDER — ALPRAZOLAM 0.25 MG
TABLET ORAL
COMMUNITY
Start: 2024-09-10

## 2024-09-26 DIAGNOSIS — R06.83 SNORING: Chronic | ICD-10-CM

## 2024-09-26 DIAGNOSIS — R06.00 DYSPNEA, UNSPECIFIED TYPE: ICD-10-CM

## 2024-09-26 DIAGNOSIS — Z98.890 HISTORY OF TRACHEOSTOMY: Chronic | ICD-10-CM

## 2024-09-26 DIAGNOSIS — J98.4 RESTRICTIVE LUNG DISEASE: Primary | ICD-10-CM

## 2024-09-27 ENCOUNTER — TELEPHONE (OUTPATIENT)
Dept: PULMONOLOGY | Facility: CLINIC | Age: 64
End: 2024-09-27
Payer: COMMERCIAL

## 2024-10-10 ENCOUNTER — HOSPITAL ENCOUNTER (OUTPATIENT)
Dept: CT IMAGING | Facility: HOSPITAL | Age: 64
Discharge: HOME | End: 2024-10-10
Admitting: NURSE PRACTITIONER
Payer: COMMERCIAL

## 2024-10-10 DIAGNOSIS — R06.00 DYSPNEA, UNSPECIFIED TYPE: Chronic | ICD-10-CM

## 2024-10-10 DIAGNOSIS — Z87.891 FORMER SMOKER: ICD-10-CM

## 2024-10-10 PROCEDURE — 71271 CT THORAX LUNG CANCER SCR C-: CPT

## 2024-10-16 ENCOUNTER — TELEPHONE (OUTPATIENT)
Dept: NEUROLOGY | Facility: CLINIC | Age: 64
End: 2024-10-16
Payer: COMMERCIAL

## 2024-10-16 NOTE — TELEPHONE ENCOUNTER
SCHEDULING REQUEST    Caller: Emili Schmitt    Relationship to patient: Self    Best call back number: [phone]    Chief complaint: PT WAS INFORMED THAT KATIANA, DR. MAHONEY'S MA, WOULD BE REACHING OUT TO HER TO RESCHEDULE HER APPT ON 9/24/24 THAT WAS CANCELLED AS DR. MAHONEY WAS GOING TO BE UNAVAILABLE THAT DAY. PT STATES SHE HAS NOT RECEIVED A CALL BACK.    Type of visit: FOLLOW UP    If rescheduling, when is the original appointment: 9/24/2024    Requested date: ANY DAY OF WEEK; NEEDS MORNING APPT    PLEASE REVIEW AND ADVISE.

## 2024-10-17 ENCOUNTER — OFFICE VISIT (OUTPATIENT)
Dept: PULMONOLOGY | Facility: CLINIC | Age: 64
End: 2024-10-17
Payer: COMMERCIAL

## 2024-10-17 VITALS
HEART RATE: 59 BPM | BODY MASS INDEX: 33.8 KG/M2 | OXYGEN SATURATION: 96 % | RESPIRATION RATE: 16 BRPM | HEIGHT: 68 IN | WEIGHT: 223 LBS | DIASTOLIC BLOOD PRESSURE: 72 MMHG | SYSTOLIC BLOOD PRESSURE: 120 MMHG

## 2024-10-17 DIAGNOSIS — R06.00 DYSPNEA, UNSPECIFIED TYPE: Chronic | ICD-10-CM

## 2024-10-17 DIAGNOSIS — J98.4 RESTRICTIVE LUNG DISEASE: Primary | Chronic | ICD-10-CM

## 2024-10-17 DIAGNOSIS — G47.34 NOCTURNAL HYPOXIA: ICD-10-CM

## 2024-10-17 DIAGNOSIS — Z87.891 FORMER SMOKER: Chronic | ICD-10-CM

## 2024-10-17 DIAGNOSIS — Z23 NEED FOR PNEUMOCOCCAL 20-VALENT CONJUGATE VACCINATION: ICD-10-CM

## 2024-10-17 DIAGNOSIS — Z98.890 HISTORY OF TRACHEOSTOMY: Chronic | ICD-10-CM

## 2024-10-17 NOTE — PROGRESS NOTES
"Chief Complaint  Follow-up (Dyspnea, unspecified type/Would like pneumonia vaccine)    Subjective    History of Present Illness      Emili Schmitt presents to [facility] for:    History of Present Illness   Follow-up after being seen as a new patient last month with shortness of breath, cough, phlegm production and chest tightness present since July 2023.  She had mild restriction on a PFT from [facility].  She quit smoking in July 2023 and she had a low-dose lung cancer screening last week showing some mild atelectasis, a few calcified granulomas, elevation of the left diaphragm and mild cardiomegaly; lung RADS 2.  It did appear that she had air in the descending colon which could account for the elevated diaphragm.  She reports that she has frequent reflux and \"gas\".  She has a history of a tracheostomy and is post decannulation from 2023.  She had an overnight pulse ox study 9/25/2024 showing the need for oxygen and a very high oxygen desaturation index.  She is currently awaiting a sleep study scheduled for 12/18/2024.  In the meantime she is wearing nocturnal oxygen.  Since the findings on the CT scan do not account for all of the patient's symptoms, I recommend a referral to ENT for Dr. Velázquez to evaluate her since she had a tracheostomy for a period of time and she is agreeable.  She received a flu shot at her last visit here and received Prevnar 20 today.  I will plan on seeing her back in about 4 months and we will be in touch with her with the results of the sleep study.  Objective   Vital Signs:   /72   Pulse 59   Resp 16   Ht 172.7 cm (68\")   Wt 101 kg (223 lb)   SpO2 96%   BMI 33.91 kg/m²     Physical Exam  Vitals reviewed. Exam conducted with a chaperone present.   Constitutional:       Appearance: She is well-developed.   HENT:      Head: Normocephalic and atraumatic.   Eyes:      Comments: Wears glasses   Neck:      " Vascular: No carotid bruit.   Cardiovascular:      Rate and Rhythm: Normal rate and regular rhythm.   Pulmonary:      Effort: Pulmonary effort is normal.      Breath sounds: Normal breath sounds.   Abdominal:      General: There is no distension.   Musculoskeletal:         General: Normal range of motion.      Cervical back: Normal range of motion and neck supple.   Skin:     General: Skin is warm and dry.   Neurological:      Mental Status: She is alert and oriented to person, place, and time.      Motor: Tremor present.   Psychiatric:         Mood and Affect: Mood normal.         Cognition and Memory: She exhibits impaired remote memory.        Result Review :     CT Chest Low Dose Cancer Screening WO (10/10/2024 10:34)   No results found for this or any previous visit.             Assessment and Plan      Diagnoses and all orders for this visit:    1. Restrictive lung disease (Primary)  Comments:  Stable.  Weight loss would be beneficial and this was discussed with the patient today.  Continue albuterol when and if needed.    2. Dyspnea, unspecified type  Comments:  Stable.  Use albuterol as needed.  Work towards weight loss.  See ENT to see if there is any cause for dyspnea coming from airway.    3. History of tracheostomy  Comments:  Decannulated in the summer 2023.  See Dr. Velázquez for evaluation.  Orders:  -     Ambulatory Referral to ENT (Otolaryngology)    4. Former smoker  Comments:  Quit in July 2023.  Continue annual lung cancer screenings.    5. Nocturnal hypoxia with suspected sleep apnea  Comments:  Awaiting sleep study 12/18/2024.  Continue nocturnal oxygen in the meantime.    6. Need for pneumococcal 20-valent conjugate vaccination  Comments:  She received Prevnar 20 today.  Orders:  -     Pneumococcal Conjugate Vaccine 20-Valent All      BHAVESH Willis  10/17/2024  12:54 CDT    Follow Up   Return in about 4 months (around 2/17/2025) for ENT referral, sleep study.    Patient was  given instructions and counseling regarding her condition or for health maintenance advice. Please see specific information pulled into the AVS if appropriate.

## 2024-10-22 ENCOUNTER — TELEPHONE (OUTPATIENT)
Dept: OTOLARYNGOLOGY | Facility: CLINIC | Age: 64
End: 2024-10-22
Payer: COMMERCIAL

## 2024-10-22 NOTE — TELEPHONE ENCOUNTER
Called to notify the pt of their appt on 11/14/2024 at 10:00 AM. The pt answered and was given the information. Pt v/u.

## 2024-11-01 ENCOUNTER — HOSPITAL ENCOUNTER (EMERGENCY)
Facility: HOSPITAL | Age: 64
Discharge: HOME | End: 2024-11-02
Attending: EMERGENCY MEDICINE | Admitting: EMERGENCY MEDICINE
Payer: COMMERCIAL

## 2024-11-01 ENCOUNTER — APPOINTMENT (OUTPATIENT)
Dept: GENERAL RADIOLOGY | Facility: HOSPITAL | Age: 64
End: 2024-11-01
Payer: COMMERCIAL

## 2024-11-01 ENCOUNTER — APPOINTMENT (OUTPATIENT)
Dept: CT IMAGING | Facility: HOSPITAL | Age: 64
End: 2024-11-01
Payer: COMMERCIAL

## 2024-11-01 DIAGNOSIS — J18.9 ATYPICAL PNEUMONIA: Primary | ICD-10-CM

## 2024-11-01 LAB
ALBUMIN SERPL-MCNC: 3.9 G/DL (ref 3.5–5.2)
ALBUMIN/GLOB SERPL: 1.2 G/DL
ALP SERPL-CCNC: 70 U/L (ref 39–117)
ALT SERPL W P-5'-P-CCNC: 15 U/L (ref 1–33)
ANION GAP SERPL CALCULATED.3IONS-SCNC: 12 MMOL/L (ref 5–15)
AST SERPL-CCNC: 14 U/L (ref 1–32)
B PARAPERT DNA SPEC QL NAA+PROBE: NOT DETECTED
B PERT DNA SPEC QL NAA+PROBE: NOT DETECTED
BASOPHILS # BLD AUTO: 0.02 10*3/MM3 (ref 0–0.2)
BASOPHILS NFR BLD AUTO: 0.3 % (ref 0–1.5)
BILIRUB SERPL-MCNC: 0.4 MG/DL (ref 0–1.2)
BUN SERPL-MCNC: 13 MG/DL (ref 8–23)
BUN/CREAT SERPL: 15.9 (ref 7–25)
C PNEUM DNA NPH QL NAA+NON-PROBE: NOT DETECTED
CALCIUM SPEC-SCNC: 9.3 MG/DL (ref 8.6–10.5)
CHLORIDE SERPL-SCNC: 103 MMOL/L (ref 98–107)
CO2 SERPL-SCNC: 26 MMOL/L (ref 22–29)
CREAT SERPL-MCNC: 0.82 MG/DL (ref 0.57–1)
D DIMER PPP FEU-MCNC: 0.88 MCGFEU/ML (ref 0–0.62)
D-LACTATE SERPL-SCNC: 2.4 MMOL/L (ref 0.5–2)
DEPRECATED RDW RBC AUTO: 48.1 FL (ref 37–54)
EGFRCR SERPLBLD CKD-EPI 2021: 81 ML/MIN/1.73
EOSINOPHIL # BLD AUTO: 0.14 10*3/MM3 (ref 0–0.4)
EOSINOPHIL NFR BLD AUTO: 1.9 % (ref 0.3–6.2)
ERYTHROCYTE [DISTWIDTH] IN BLOOD BY AUTOMATED COUNT: 14.3 % (ref 12.3–15.4)
FLUAV SUBTYP SPEC NAA+PROBE: NOT DETECTED
FLUBV RNA ISLT QL NAA+PROBE: NOT DETECTED
GLOBULIN UR ELPH-MCNC: 3.2 GM/DL
GLUCOSE SERPL-MCNC: 125 MG/DL (ref 65–99)
HADV DNA SPEC NAA+PROBE: NOT DETECTED
HCOV 229E RNA SPEC QL NAA+PROBE: NOT DETECTED
HCOV HKU1 RNA SPEC QL NAA+PROBE: NOT DETECTED
HCOV NL63 RNA SPEC QL NAA+PROBE: NOT DETECTED
HCOV OC43 RNA SPEC QL NAA+PROBE: NOT DETECTED
HCT VFR BLD AUTO: 35.7 % (ref 34–46.6)
HGB BLD-MCNC: 11.4 G/DL (ref 12–15.9)
HMPV RNA NPH QL NAA+NON-PROBE: NOT DETECTED
HPIV1 RNA ISLT QL NAA+PROBE: NOT DETECTED
HPIV2 RNA SPEC QL NAA+PROBE: NOT DETECTED
HPIV3 RNA NPH QL NAA+PROBE: NOT DETECTED
HPIV4 P GENE NPH QL NAA+PROBE: NOT DETECTED
IMM GRANULOCYTES # BLD AUTO: 0.03 10*3/MM3 (ref 0–0.05)
IMM GRANULOCYTES NFR BLD AUTO: 0.4 % (ref 0–0.5)
LIPASE SERPL-CCNC: 12 U/L (ref 13–60)
LYMPHOCYTES # BLD AUTO: 1.31 10*3/MM3 (ref 0.7–3.1)
LYMPHOCYTES NFR BLD AUTO: 17.7 % (ref 19.6–45.3)
M PNEUMO IGG SER IA-ACNC: NOT DETECTED
MCH RBC QN AUTO: 29.4 PG (ref 26.6–33)
MCHC RBC AUTO-ENTMCNC: 31.9 G/DL (ref 31.5–35.7)
MCV RBC AUTO: 92 FL (ref 79–97)
MONOCYTES # BLD AUTO: 0.6 10*3/MM3 (ref 0.1–0.9)
MONOCYTES NFR BLD AUTO: 8.1 % (ref 5–12)
NEUTROPHILS NFR BLD AUTO: 5.31 10*3/MM3 (ref 1.7–7)
NEUTROPHILS NFR BLD AUTO: 71.6 % (ref 42.7–76)
NRBC BLD AUTO-RTO: 0 /100 WBC (ref 0–0.2)
NT-PROBNP SERPL-MCNC: 113.7 PG/ML (ref 0–900)
PLATELET # BLD AUTO: 260 10*3/MM3 (ref 140–450)
PMV BLD AUTO: 11.3 FL (ref 6–12)
POTASSIUM SERPL-SCNC: 3.6 MMOL/L (ref 3.5–5.2)
PROCALCITONIN SERPL-MCNC: 0.14 NG/ML (ref 0–0.25)
PROT SERPL-MCNC: 7.1 G/DL (ref 6–8.5)
RBC # BLD AUTO: 3.88 10*6/MM3 (ref 3.77–5.28)
RHINOVIRUS RNA SPEC NAA+PROBE: NOT DETECTED
RSV RNA NPH QL NAA+NON-PROBE: NOT DETECTED
SARS-COV-2 RNA NPH QL NAA+NON-PROBE: NOT DETECTED
SODIUM SERPL-SCNC: 141 MMOL/L (ref 136–145)
TROPONIN T SERPL HS-MCNC: 6 NG/L
WBC NRBC COR # BLD AUTO: 7.41 10*3/MM3 (ref 3.4–10.8)

## 2024-11-01 PROCEDURE — 93005 ELECTROCARDIOGRAM TRACING: CPT

## 2024-11-01 PROCEDURE — 25510000001 IOPAMIDOL PER 1 ML: Performed by: EMERGENCY MEDICINE

## 2024-11-01 PROCEDURE — 83605 ASSAY OF LACTIC ACID: CPT | Performed by: EMERGENCY MEDICINE

## 2024-11-01 PROCEDURE — 80053 COMPREHEN METABOLIC PANEL: CPT | Performed by: EMERGENCY MEDICINE

## 2024-11-01 PROCEDURE — 93005 ELECTROCARDIOGRAM TRACING: CPT | Performed by: EMERGENCY MEDICINE

## 2024-11-01 PROCEDURE — 84145 PROCALCITONIN (PCT): CPT | Performed by: EMERGENCY MEDICINE

## 2024-11-01 PROCEDURE — 25010000002 ONDANSETRON PER 1 MG: Performed by: EMERGENCY MEDICINE

## 2024-11-01 PROCEDURE — 36415 COLL VENOUS BLD VENIPUNCTURE: CPT

## 2024-11-01 PROCEDURE — 85025 COMPLETE CBC W/AUTO DIFF WBC: CPT | Performed by: EMERGENCY MEDICINE

## 2024-11-01 PROCEDURE — 0202U NFCT DS 22 TRGT SARS-COV-2: CPT | Performed by: EMERGENCY MEDICINE

## 2024-11-01 PROCEDURE — 96374 THER/PROPH/DIAG INJ IV PUSH: CPT

## 2024-11-01 PROCEDURE — 99285 EMERGENCY DEPT VISIT HI MDM: CPT

## 2024-11-01 PROCEDURE — 25810000003 SODIUM CHLORIDE 0.9 % SOLUTION: Performed by: EMERGENCY MEDICINE

## 2024-11-01 PROCEDURE — 93010 ELECTROCARDIOGRAM REPORT: CPT | Performed by: INTERNAL MEDICINE

## 2024-11-01 PROCEDURE — 71275 CT ANGIOGRAPHY CHEST: CPT

## 2024-11-01 PROCEDURE — 71046 X-RAY EXAM CHEST 2 VIEWS: CPT

## 2024-11-01 PROCEDURE — 94799 UNLISTED PULMONARY SVC/PX: CPT

## 2024-11-01 PROCEDURE — 83880 ASSAY OF NATRIURETIC PEPTIDE: CPT | Performed by: EMERGENCY MEDICINE

## 2024-11-01 PROCEDURE — 94664 DEMO&/EVAL PT USE INHALER: CPT

## 2024-11-01 PROCEDURE — 85379 FIBRIN DEGRADATION QUANT: CPT | Performed by: EMERGENCY MEDICINE

## 2024-11-01 PROCEDURE — 94640 AIRWAY INHALATION TREATMENT: CPT

## 2024-11-01 PROCEDURE — 84484 ASSAY OF TROPONIN QUANT: CPT | Performed by: EMERGENCY MEDICINE

## 2024-11-01 PROCEDURE — 83690 ASSAY OF LIPASE: CPT | Performed by: EMERGENCY MEDICINE

## 2024-11-01 RX ORDER — IOPAMIDOL 755 MG/ML
100 INJECTION, SOLUTION INTRAVASCULAR
Status: COMPLETED | OUTPATIENT
Start: 2024-11-01 | End: 2024-11-01

## 2024-11-01 RX ORDER — ONDANSETRON 2 MG/ML
4 INJECTION INTRAMUSCULAR; INTRAVENOUS ONCE
Status: COMPLETED | OUTPATIENT
Start: 2024-11-01 | End: 2024-11-01

## 2024-11-01 RX ORDER — ALBUTEROL SULFATE 0.83 MG/ML
2.5 SOLUTION RESPIRATORY (INHALATION) ONCE
Status: COMPLETED | OUTPATIENT
Start: 2024-11-01 | End: 2024-11-01

## 2024-11-01 RX ADMIN — SODIUM CHLORIDE 1000 ML: 9 INJECTION, SOLUTION INTRAVENOUS at 21:27

## 2024-11-01 RX ADMIN — ALBUTEROL SULFATE 2.5 MG: 2.5 SOLUTION RESPIRATORY (INHALATION) at 20:26

## 2024-11-01 RX ADMIN — ONDANSETRON 4 MG: 2 INJECTION INTRAMUSCULAR; INTRAVENOUS at 21:08

## 2024-11-01 RX ADMIN — IOPAMIDOL 100 ML: 755 INJECTION, SOLUTION INTRAVENOUS at 22:37

## 2024-11-02 VITALS
TEMPERATURE: 98.2 F | HEIGHT: 68 IN | BODY MASS INDEX: 33.34 KG/M2 | DIASTOLIC BLOOD PRESSURE: 102 MMHG | SYSTOLIC BLOOD PRESSURE: 144 MMHG | OXYGEN SATURATION: 95 % | HEART RATE: 66 BPM | RESPIRATION RATE: 17 BRPM | WEIGHT: 220 LBS

## 2024-11-02 LAB — D-LACTATE SERPL-SCNC: 1 MMOL/L (ref 0.5–2)

## 2024-11-02 RX ORDER — DOXYCYCLINE 100 MG/1
100 TABLET ORAL ONCE
Status: COMPLETED | OUTPATIENT
Start: 2024-11-02 | End: 2024-11-02

## 2024-11-02 RX ORDER — DOXYCYCLINE 100 MG/1
100 CAPSULE ORAL 2 TIMES DAILY
Qty: 14 CAPSULE | Refills: 0 | Status: SHIPPED | OUTPATIENT
Start: 2024-11-02 | End: 2024-11-09

## 2024-11-02 RX ADMIN — DOXYCYCLINE 100 MG: 100 TABLET ORAL at 01:49

## 2024-11-02 NOTE — ED PROVIDER NOTES
"EMERGENCY DEPARTMENT ATTENDING NOTE    Patient Name: Emili Schmitt    Chief Complaint   Patient presents with    Shortness of Breath       PATIENT PRESENTATION:  Emili Schmitt is a 63 y.o. female with PMH significant for hysterectomy, appendectomy, A-fib, carotid artery occlusion, hyperlipidemia, ARDS with 5 weeks on a ventilator in 2023, DVT and PE at that time on Eliquis with no missed doses who presents to the ED with worsening shortness of breath over the past week.  Patient normally walks with her  daily for exercise.  Gets short of breath when trying to walk across the house.  Denies any recent travel, recent surgeries, history of cancer, lower extremity pain or swelling, hemoptysis.  Patient had a fever last Friday and it went away for 2 days and then started having a fever since Sunday.  Went to her local ER and was diagnosed with strep and treated with antibiotics.  Had multiple swabs taken at that time.  Denies any wheezing or shortness of breath.  No history of reactive airway disease.  No swelling in her legs, or history of heart failure.      PHYSICAL EXAM:   VS: /100 (BP Location: Right arm, Patient Position: Sitting)   Pulse 64   Temp 98.4 °F (36.9 °C) (Oral)   Resp 17   Ht 172.7 cm (68\")   Wt 99.8 kg (220 lb)   SpO2 94%   BMI 33.45 kg/m²   GENERAL: well-nourished, well-developed, awake, alert, no acute distress, nontoxic appearing, comfortable  EYES: PERRL, sclerae anicteric, extraocular movements grossly intact, symmetric lids  EARS, NOSE, MOUTH, THROAT: atraumatic external nose and ears, moist mucous membranes  NECK: symmetric, trachea midline  RESPIRATORY: unlabored respiratory effort, clear to auscultation bilaterally, good air movement, no wheezing  CARDIOVASCULAR: no murmurs, peripheral pulses 2+ and equal in all extremities  GI: soft, nontender, nondistended  MUSCULOSKELETAL/EXTREMITIES: extremities without obvious deformity, no lower extremity swelling or " "edema  SKIN: warm and dry with no obvious rashes  NEUROLOGIC: moving all 4 extremities symmetrically, CN II-XII grossly intact  PSYCHIATRIC: alert, pleasant and cooperative. Appropriate mood and affect.      MEDICAL DECISION MAKING:    Emili Schmitt is a 63 y.o. female who presented to the ED with worsening shortness of breath over the past week    Procedures    Differential Diagnosis Considered: Bacterial pneumonia, reactive airway disease, heart failure, pulmonary embolism, atypical acute coronary syndrome, restrictive lung disease    Labs Ordered:  Labs Reviewed   COMPREHENSIVE METABOLIC PANEL - Abnormal; Notable for the following components:       Result Value    Glucose 125 (*)     All other components within normal limits    Narrative:     GFR Normal >60  Chronic Kidney Disease <60  Kidney Failure <15     LIPASE - Abnormal; Notable for the following components:    Lipase 12 (*)     All other components within normal limits   D-DIMER, QUANTITATIVE - Abnormal; Notable for the following components:    D-Dimer, Quantitative 0.88 (*)     All other components within normal limits    Narrative:     According to the assay 's published package insert, a normal (<0.50 MCGFEU/mL) D-dimer result in conjunction with a non-high clinical probability assessment, excludes deep vein thrombosis (DVT) and pulmonary embolism (PE) with high sensitivity.    D-dimer values increase with age and this can make VTE exclusion of an older population difficult. To address this, the [employer], based on best available evidence and recent guidelines, recommends that clinicians use age-adjusted D-dimer thresholds in patients greater than 50 years of age with: a) a low probability of PE who do not meet all Pulmonary Embolism Rule Out Criteria, or b) in those with intermediate probability of PE.   The formula for an age-adjusted D-dimer cut-off is \"age/100\".  For example, a 60 year old patient would have " an age-adjusted cut-off of 0.60 MCGFEU/mL and an 80 year old 0.80 MCGFEU/mL.   CBC WITH AUTO DIFFERENTIAL - Abnormal; Notable for the following components:    Hemoglobin 11.4 (*)     Lymphocyte % 17.7 (*)     All other components within normal limits   LACTIC ACID, PLASMA - Abnormal; Notable for the following components:    Lactate 2.4 (*)     All other components within normal limits   RESPIRATORY PANEL PCR W/ COVID-19 (SARS-COV-2), NP SWAB IN UTM/VTP, 2 HR TAT - Normal    Narrative:     In the setting of a positive respiratory panel with a viral infection PLUS a negative procalcitonin without other underlying concern for bacterial infection, consider observing off antibiotics or discontinuation of antibiotics and continue supportive care. If the respiratory panel is positive for atypical bacterial infection (Bordetella pertussis, Chlamydophila pneumoniae, or Mycoplasma pneumoniae), consider antibiotic de-escalation to target atypical bacterial infection.   BNP (IN-HOUSE) - Normal    Narrative:     This assay is used as an aid in the diagnosis of individuals suspected of having heart failure. It can be used as an aid in the diagnosis of acute decompensated heart failure (ADHF) in patients presenting with signs and symptoms of ADHF to the emergency department (ED). In addition, NT-proBNP of <300 pg/mL indicates ADHF is not likely.    Age Range Result Interpretation  NT-proBNP Concentration (pg/mL:      <50             Positive            >450                   Gray                 300-450                    Negative             <300    50-75           Positive            >900                  Gray                300-900                  Negative            <300      >75             Positive            >1800                  Gray                300-1800                  Negative            <300   SINGLE HS TROPONIN T - Normal    Narrative:     High Sensitive Troponin T Reference Range:  <14.0 ng/L- Negative Female  "for AMI  <22.0 ng/L- Negative Male for AMI  >=14 - Abnormal Female indicating possible myocardial injury.  >=22 - Abnormal Male indicating possible myocardial injury.   Clinicians would have to utilize clinical acumen, EKG, Troponin, and serial changes to determine if it is an Acute Myocardial Infarction or myocardial injury due to an underlying chronic condition.        PROCALCITONIN - Normal    Narrative:     As a Marker for Sepsis (Non-Neonates):    1. <0.5 ng/mL represents a low risk of severe sepsis and/or septic shock.  2. >2 ng/mL represents a high risk of severe sepsis and/or septic shock.    As a Marker for Lower Respiratory Tract Infections that require antibiotic therapy:    PCT on Admission    Antibiotic Therapy       6-12 Hrs later    >0.5                Strongly Recommended  >0.25 - <0.5        Recommended   0.1 - 0.25          Discouraged              Remeasure/reassess PCT  <0.1                Strongly Discouraged     Remeasure/reassess PCT    As 28 day mortality risk marker: \"Change in Procalcitonin Result\" (>80% or <=80%) if Day 0 (or Day 1) and Day 4 values are available. Refer to http://www.[employer] [facility] [place]pct[facility]calculator.com    Change in PCT <=80%  A decrease of PCT levels below or equal to 80% defines a positive change in PCT test result representing a higher risk for 28-day all-cause mortality of patients diagnosed with severe sepsis for septic shock.    Change in PCT >80%  A decrease of PCT levels of more than 80% defines a negative change in PCT result representing a lower risk for 28-day all-cause mortality of patients diagnosed with severe sepsis or septic shock.      LACTIC ACID, REFLEX - Normal   CBC AND DIFFERENTIAL    Narrative:     The following orders were created for panel order CBC & Differential.  Procedure                               Abnormality         Status                     ---------                               -----------         ------                     CBC Auto " Differential[[id number]]        Abnormal            Final result                 Please view results for these tests on the individual orders.        Imaging Ordered:   XR Chest 2 View   Final Result   1. Chronic elevation left hemidiaphragm with bibasilar atelectasis and   or scarring. No acute consolidation.       This report was signed and finalized on 11/1/2024 8:18 PM by Dr. Remedios Osborn MD.          CT Angiogram Chest    (Results Pending)       Internal chart review:   Past Medical History:   Diagnosis Date    Arthritis     Atrial fibrillation     Carotid artery occlusion     Hyperlipidemia     Interstitial cystitis     Macular dystrophy     Neuropathy     Peripheral neuropathy     Restless leg syndrome        Past Surgical History:   Procedure Laterality Date    APPENDECTOMY      BREAST CYST ASPIRATION Left     HERNIA REPAIR      HYSTERECTOMY      [facility] unsure if she has tubes due to hemmorage after birth    OTHER SURGICAL HISTORY      TIF surgery       Allergies   Allergen Reactions    Hydrocodone Itching     Severe itching    Propranolol Diarrhea and GI Intolerance    Tylenol [Acetaminophen] Itching     Not allergic       No current facility-administered medications for this encounter.    Current Outpatient Medications:     albuterol (ACCUNEB) 0.63 MG/3ML nebulizer solution, as directed Inhalation, Disp: , Rfl:     albuterol sulfate HFA (ProAir HFA) 108 (90 Base) MCG/ACT inhaler, Inhale 2 inhalations 4 times a day by inhalation route as needed., Disp: , Rfl:     ALPRAZolam (XANAX) 0.25 MG tablet, , Disp: , Rfl:     amiodarone (PACERONE) 200 MG tablet, Take 1 tablet by mouth Daily., Disp: , Rfl:     apixaban (ELIQUIS) 5 MG tablet tablet, Take 1 tablet by mouth 2 (Two) Times a Day., Disp: , Rfl:     doxycycline (MONODOX) 100 MG capsule, Take 1 capsule by mouth 2 (Two) Times a Day for 7 days., Disp: 14 capsule, Rfl: 0    DULoxetine (CYMBALTA) 60 MG capsule, Take 1 capsule by mouth Daily. (Patient not taking:  "Reported on 10/17/2024), Disp: , Rfl:     gabapentin (Neurontin) 600 MG tablet, Take 1 tablet by mouth 2 (Two) Times a Day., Disp: 60 tablet, Rfl: 5    metoprolol succinate XL (TOPROL-XL) 50 MG 24 hr tablet, Take 0.5 tablets by mouth Daily., Disp: , Rfl:     ondansetron ODT (ZOFRAN-ODT) 4 MG disintegrating tablet, Place 1 tablet on the tongue Daily. (Patient not taking: Reported on 9/19/2024), Disp: , Rfl:     pantoprazole (PROTONIX) 40 MG EC tablet, Take 1 tablet by mouth Daily., Disp: , Rfl:     sennosides-docusate (senna-docusate sodium) 8.6-50 MG per tablet, Take 1 tablet by mouth Daily. TAke 2 tabs daily (Patient not taking: Reported on 9/19/2024), Disp: , Rfl:     traZODone (DESYREL) 150 MG tablet, Take 1 tablet by mouth Every Night. (Patient not taking: Reported on 9/19/2024), Disp: , Rfl:     vitamin C (ASCORBIC ACID) 500 MG tablet, Take 1 tablet by mouth 2 (Two) Times a Day. (Patient not taking: Reported on 9/19/2024), Disp: , Rfl:     vitamin D3 (Vitamin D) 125 MCG (5000 UT) capsule capsule, Take 1 capsule by mouth Daily. (Patient not taking: Reported on 9/19/2024), Disp: , Rfl:     External documents reviewed: Reviewed pulmonology documentation from 17 October of this year when patient has been diagnosed with restrictive lung disease after being a former smoker with history of tracheostomy, overweight, and sleep apnea.  Has been vaccinated for pneumococcal.    My EKG interpretation: EKG normal sinus rhythm, artifact present, rate of 65, normal intervals, no signs of acute ischemia or arrhythmia.    My lab interpretation: Lactic acidosis resolved.  Positive dimer.  Negative troponin with a week of symptoms.  Do not suspect ACS.  Negative viral swabs    My imaging interpretation: Chest x-ray without acute process.  CT PE consistent with atypical pneumonia.  CT interpretation: \"Findings are compatible with a diffuse infectious inflammatory pulmonary process evident by diffusely distributed nodular and " "tree-in-bud type opacities throughout both lungs\"    Discussed with: Patient and daughter    Shared decision making: With no hypoxia, no increased work of breathing, and patient p.o. tolerant, believe should be appropriate for outpatient antibiotics.  Discussed that would not be unreasonable given her history of ARDS and ICU admission, to monitor overnight on antibiotics prior to discharge and patient declined.  Believes reasonable.    ED Course and Re-evaluation: Return precautions discussed and patient prescribed 1 dose of doxycycline prior to discharge since she cannot get to the pharmacy until the morning.    ED Course as of 11/02/24 0052   Fri Nov 01, 2024 2028 Chest x-ray with no acute changes.  Unchanged chronic elevation of left hemidiaphragm. [JJ]   2117 Patient with elevated lactate and D-dimer.  CT PE ordered and fluids given. [JJ]   Sat Nov 02, 2024   0048 BMP within normal limits, normal procalcitonin.  Viral swabs without signs of infection.  Negative troponin.  Lactate elevated 2.4 repeat of 1.  Improved after fluid hydration.    CT PE without signs of blood clot but consistent with possible atypical pneumonia.  Will treat with antibiotics and patient without any hypoxia or increased work of breathing here so appropriate for outpatient antibiotics at this time.  She will follow-up with her PCM. [JJ]      ED Course User Index  [JJ] Tre Blake MD        ED Critical Care time:     ED Diagnosis:  (J18.9) Atypical pneumonia     Disposition: to home  Follow up plan: PCP follow up within 2 days, return to ED immediately if symptoms worsen        Signed:  Tre Blake MD  Emergency Medicine Physician    Please note that portions of this note were completed with a voice recognition program.      Tre Blake MD  11/02/24 0056    "

## 2024-11-04 LAB
QT INTERVAL: 390 MS
QTC INTERVAL: 405 MS

## 2024-11-13 NOTE — PROGRESS NOTES
Jef Velázquez Jr, MD  [facility]  [address], [address]  [place] [address]  Fax [phone]  Phone [phone]      Visit Type: NEW PATIENT   Chief Complaint   Patient presents with    hx of trach    Problems breathing           HISTORY OBTAINED FROM: patient  Accompanied by:No one  HPI  She presents for a follow up evaluation. Since decanulation last summer, she has had nocturnal dyspnea. She notes with lying down at noght.  Has had trouble breathing. Pulmonary can find nothing.  Has sleep study ordered. Sched 12/18/2024  Has EVER without pain. Will note at night. Will have sensation coming up at times.       Past Medical History:   Diagnosis Date    Arthritis     Atrial fibrillation     Carotid artery occlusion     Hyperlipidemia     Interstitial cystitis     Macular dystrophy     Neuropathy     Peripheral neuropathy     Restless leg syndrome        Past Surgical History:   Procedure Laterality Date    APPENDECTOMY      BREAST CYST ASPIRATION Left     HERNIA REPAIR      HYSTERECTOMY      [facility] unsure if she has tubes due to hemmorage after birth    OTHER SURGICAL HISTORY      TIF surgery       Family History: Her family history includes Neuropathy in her father.     Social History: She  reports that she has quit smoking. Her smoking use included cigarettes. She has a 15 pack-year smoking history. She has never used smokeless tobacco. She reports that she does not drink alcohol and does not use drugs.    Home Medications:  ALPRAZolam, albuterol, albuterol sulfate HFA, amiodarone, apixaban, gabapentin, metoprolol succinate XL, ondansetron ODT, pantoprazole, sennosides-docusate, and traZODone    Allergies:  She is allergic to hydrocodone, propranolol, and tylenol [acetaminophen].       Vital Signs:   Temp:  [98.4 °F (36.9 °C)] 98.4 °F (36.9 °C)  Heart Rate:  [58] 58  BP: (158)/(98) 158/98  ENT Physical  Exam  Constitutional  Appearance: patient appears well-developed, obesity noted, patient is cooperative;  Communication/Voice: communication appropriate for developmental age; vocal quality normal;  Head and Face  Appearance: head appears normal, face appears normal and face appears atraumatic;  Palpation: facial palpation normal;  Salivary: glands normal;  Ear  Hearing: intact;  Auricles: bilateral auricles normal;  External Mastoids: right external mastoid normal; left external mastoid normal;  Ear Canals: bilateral ear canals normal;  Tympanic Membranes: bilateral tympanic membranes normal;  Nose  External Nose: nares patent bilaterally; external nose normal;  Internal Nose: nasal mucosa normal; nasal septal deviation present; deviation is to the right, septal deviation is intermediate; bilateral inferior turbinates normal;  Oral Cavity/Oropharynx  Lips: normal;  Teeth: normal;  Gums: gingiva normal;  Tongue: tongue macroglossia present; Carrasquillo III position; Oral Tongue comments: mod prolapse  Oral mucosa: normal;  Hard palate: normal;  Soft palate: normal;  Tonsils: normal;  Base of Tongue: normal;  Posterior pharyngeal wall: normal;  Neck  Neck: scars (trach) and large neck circumference present; neck palpation normal;  Thyroid: thyroid normal;  Respiratory  Inspection: breathing unlabored; normal breathing rate;  Cardiovascular  Inspection: extremities are warm and well perfused; no peripheral edema present;  Neurovestibular  Mental Status: alert and oriented;  Psychiatric: mood normal; affect is appropriate;       Laryngoscopy    Date/Time: 11/14/2024 10:28 AM    Performed by: Jef Velázquez Jr., MD  Authorized by: Jef Velázquez Jr., MD    Consent:     Consent obtained:  Verbal    Consent given by:  Patient    Alternatives discussed:  No treatment  Anesthesia (see MAR for exact dosages):     Anesthesia method:  Topical application    Topical anesthetic:  Tetracaine  Procedure details:      Indications: airway obstruction and assessment of airway      Medication:  Afrin    Instrument: flexible fiberoptic laryngoscope      Scope location: left nare    Sinus/ Nasopharynx:     Right nasopharynx: patent and inflammation      Left nasopharynx: patent and inflammation      Right eustachian tube: patent      Left eustachian tube: inflammation, patent and inflammation    Oropharynx/ Supraglottis:     Posterior pharyngeal wall: inflamed      Oropharynx: inflammation      Vallecula: inflammation      Vallecula: no lesion      Base of tongue: lingual tonsillar hypertrophy (Moderate) and inflammation      Base of tongue: no lesion      Epiglottis: inflammation and retroflexed (Very mildly)      Epiglottis: no lesions    Larynx/ Hypopharynx:     Arytenoids: inflammation and interarytenoid edema      Arytenoids: no lesions      Hypopharynx: inflammation      Hypopharynx: no lesions      Pyriform sinus: inflammation      Pyriform sinus: no lesion and no pooling of secretions      False vocal cords: inflammation      False vocal cords: no lesion      True vocal cords: normal    Post-procedure details:     Patient tolerance of procedure:  Tolerated well  Comments:      Moderate inflammation from nasopharynx down to the hypopharynx, including the supraglottis and glottis  No evidence of lesions in the nasopharynx, oropharynx, hypopharynx, larynx  Generalized airway compromise in the hypopharyngeal level including base of tongue with tongue prolapse, mildly retroflexed epiglottis, lateral wall redundancy  True vocal folds are normal with no evidence of stenosis in the subglottic area     Result Review       RESULTS REVIEW    I have reviewed the patients old records in the chart.   I have reviewed the patients old records in the chart.  The following results/records were reviewed:  I reviewed the patient's new imaging results and agree with the interpretation.   Referral to Otolaryngology for History of tracheostomy  (10/17/2024)   CT Chest Low Dose Cancer Screening WO (10/10/2024 10:34)    CT Angiogram Chest (11/01/2024 22:37)    Progress Notes by Reji Nicolas APRN (10/17/2024 08:30)    ED Provider Notes by Tre Blake MD (11/01/2024 19:49)  Review of CT scan from CT angio chest, reveals extrathoracic trachea at site of tracheostomy with increased AP diameter but no evidence of stenosis       Assessment & Plan  Airway compromise  Hypopharyngeal level, epiglottic retroflexion  Dyspnea, unspecified type  Related to hypopharynx and lower airway compromise  Tracheal compression  Minimal  Essential tremor  Causing titubation  Titubation    Gastroesophageal reflux disease without esophagitis  Noted on examination  Lingual tonsil hypertrophy  Present and compromising hypopharynx  Acquired macroglossia  With moderate prolapse  Obstructive sleep apnea  Sleep study pending     Orders Placed This Encounter   Procedures    Flexible laryngoscopy         Conservative management.  Patient appears to have airway compromise at the supraglottic level and base of tongue.  I will await her sleep study to see if perhaps CPAP or other therapy will help her breathing.  She only has dyspnea with lying down.  This is consistent with lingual tonsil hypertrophy, pressure on the epiglottis and a retroflexed epiglottis.  On CT scanning, I do not see any true tracheal stenosis.  She does have a increased AP diameter but without any narrowing of the airway at any level.  Her trach site is well-healed.  Flonase twice daily  Nasal saline  Continue inhalers  Continue with sleep study  Call for problems    My Chart:  Patient is using My Chart    Patient understand(s) and agree(s) with the treatment plan as described.    Return in about 3 months (around 2/14/2025) for Recheck airway, FRANCISCO JAVIER, lingual tonsil, Flex scope.        Electronically signed by Jef Velázquez Jr, MD 11/14/24 10:25 AM CST.

## 2024-11-14 ENCOUNTER — OFFICE VISIT (OUTPATIENT)
Dept: OTOLARYNGOLOGY | Facility: CLINIC | Age: 64
End: 2024-11-14
Payer: COMMERCIAL

## 2024-11-14 VITALS
SYSTOLIC BLOOD PRESSURE: 158 MMHG | DIASTOLIC BLOOD PRESSURE: 98 MMHG | HEART RATE: 58 BPM | HEIGHT: 68 IN | WEIGHT: 216 LBS | TEMPERATURE: 98.4 F | BODY MASS INDEX: 32.74 KG/M2

## 2024-11-14 DIAGNOSIS — R06.00 DYSPNEA, UNSPECIFIED TYPE: ICD-10-CM

## 2024-11-14 DIAGNOSIS — K21.9 GASTROESOPHAGEAL REFLUX DISEASE WITHOUT ESOPHAGITIS: ICD-10-CM

## 2024-11-14 DIAGNOSIS — J98.8 AIRWAY COMPROMISE: Primary | ICD-10-CM

## 2024-11-14 DIAGNOSIS — K14.8 ACQUIRED MACROGLOSSIA: ICD-10-CM

## 2024-11-14 DIAGNOSIS — J39.8 TRACHEAL COMPRESSION: ICD-10-CM

## 2024-11-14 DIAGNOSIS — R26.0 TITUBATION: ICD-10-CM

## 2024-11-14 DIAGNOSIS — G47.33 OBSTRUCTIVE SLEEP APNEA: ICD-10-CM

## 2024-11-14 DIAGNOSIS — G25.0 ESSENTIAL TREMOR: ICD-10-CM

## 2024-11-14 DIAGNOSIS — J35.1 LINGUAL TONSIL HYPERTROPHY: ICD-10-CM

## 2024-11-14 NOTE — PATIENT INSTRUCTIONS
>NASAL SALINE:  Use 2 puffs each nostril 4-6 times daily and more frequently if possible.  You can buy saline spray or you can make your own and use an old spray bottle to administer  Use a humidifier at bedside  Recipe for saline:  Water                                 1 quart  Salt (table)                        1 tablespoon  Gylcerin (or Celine Syrup)    1 teaspoon  Sodium bicarbonate           1 teaspoon  Sprays or [place] pots are recommended    Do not allow to stand for more than 24 hrs. Make new solution. There is no preservative in this solution.    Sinus irrigation and saline application can be enhanced with various over-the-counter products.  A WaterPik can be quite useful to irrigate, especially following sinus surgery.  Navage makes a product that irrigates the nose and some of the sinuses.  NeilMed makes a Sinu-Gator to irrigate the sinuses.  Neomed also has canned saline that we will come out under pressure.  A Little pot can also be helpful.  All of these products help keep the nose clear of debris.  Please use as directed on the instructions that come with the particular device.     >NASAL STEROID use:  Using nasal steroids:  You will be prescribed one of the following nasal steroids: Flonase, Nasacort, Nasonex, Rhinocort, Qnasl, Zetonna  2 puffs each nostril 2 times daily  Start as soon as possible  If you are using Afrin for 3 days with the nasal steroid,  Use Afrin first and wait 10 minutes to allow the nose to open. Then administer nasal steroids.     Please obtain sleep study    Call for problems     CONTACT INFORMATION:  The main office phone number is [phone]. For emergencies after hours and on weekends, this number will convert over to our answering service and the on call provider will answer. Please try to keep non emergent phone calls/ questions to office hours 9am-5pm Monday through Friday.     [employer]  As an alternative, you can sign up and use the Epic MyChart system for more direct  and quicker access for non emergent questions/ problems.  [facility] [employer] allows you to send messages to your doctor, view your test results, renew your prescriptions, schedule appointments, and more. To sign up, go to [employer] and click on the Sign Up Now link in the New User? box. Enter your Zhui Xin Activation Code exactly as it appears below along with the last four digits of your Social Security Number and your Date of Birth () to complete the sign-up process. If you do not sign up before the expiration date, you must request a new code.    Zhui [employer] Activation Code: Activation code not generated  Current Zhui Xin Status: Active    If you have questions, you can email [email or web address] or call [phone] to talk to our Zhui [employer] staff. Remember, [employer] is NOT to be used for urgent needs. For medical emergencies, dial 911.

## 2024-12-09 ENCOUNTER — TELEPHONE (OUTPATIENT)
Dept: PULMONOLOGY | Facility: CLINIC | Age: 64
End: 2024-12-09
Payer: COMMERCIAL

## 2024-12-09 ENCOUNTER — OFFICE VISIT (OUTPATIENT)
Dept: NEUROLOGY | Facility: CLINIC | Age: 64
End: 2024-12-09
Payer: COMMERCIAL

## 2024-12-09 VITALS
HEIGHT: 68 IN | BODY MASS INDEX: 32.58 KG/M2 | SYSTOLIC BLOOD PRESSURE: 142 MMHG | OXYGEN SATURATION: 97 % | DIASTOLIC BLOOD PRESSURE: 82 MMHG | WEIGHT: 215 LBS | HEART RATE: 56 BPM

## 2024-12-09 DIAGNOSIS — G24.9 DYSTONIA: Primary | ICD-10-CM

## 2024-12-09 DIAGNOSIS — G47.33 OBSTRUCTIVE SLEEP APNEA (ADULT) (PEDIATRIC): Primary | ICD-10-CM

## 2024-12-09 DIAGNOSIS — G25.0 ESSENTIAL TREMOR: ICD-10-CM

## 2024-12-09 PROCEDURE — 99214 OFFICE O/P EST MOD 30 MIN: CPT | Performed by: PSYCHIATRY & NEUROLOGY

## 2024-12-09 RX ORDER — GABAPENTIN 600 MG/1
600 TABLET ORAL 2 TIMES DAILY
Qty: 60 TABLET | Refills: 5 | Status: SHIPPED | OUTPATIENT
Start: 2024-12-09

## 2024-12-09 RX ORDER — TRIHEXYPHENIDYL HYDROCHLORIDE 2 MG/1
TABLET ORAL
Qty: 60 TABLET | Refills: 2 | Status: SHIPPED | OUTPATIENT
Start: 2024-12-09

## 2024-12-09 NOTE — PROGRESS NOTES
"  Subjective        Emili Schmitt presents to [facility]    History of Present Illness  64-year-old female here for follow-up.  Increased her gabapentin after last visit which she thinks did help.  However she became ill again with strep throat and pneumonia and tremor worsened since then.               Current Outpatient Medications:     albuterol (ACCUNEB) 0.63 MG/3ML nebulizer solution, as directed Inhalation, Disp: , Rfl:     albuterol sulfate HFA (ProAir HFA) 108 (90 Base) MCG/ACT inhaler, Inhale 2 inhalations 4 times a day by inhalation route as needed., Disp: , Rfl:     ALPRAZolam (XANAX) 0.25 MG tablet, Take 1 tablet by mouth At Night As Needed., Disp: , Rfl:     amiodarone (PACERONE) 200 MG tablet, Take 1 tablet by mouth Daily., Disp: , Rfl:     apixaban (ELIQUIS) 5 MG tablet tablet, Take 1 tablet by mouth 2 (Two) Times a Day., Disp: , Rfl:     gabapentin (Neurontin) 600 MG tablet, Take 1 tablet by mouth 2 (Two) Times a Day., Disp: 60 tablet, Rfl: 5    metoprolol succinate XL (TOPROL-XL) 50 MG 24 hr tablet, Take 0.5 tablets by mouth Daily., Disp: , Rfl:     ondansetron ODT (ZOFRAN-ODT) 4 MG disintegrating tablet, Place 1 tablet on the tongue Daily., Disp: , Rfl:     pantoprazole (PROTONIX) 40 MG EC tablet, Take 1 tablet by mouth Daily., Disp: , Rfl:     sennosides-docusate (senna-docusate sodium) 8.6-50 MG per tablet, Take 1 tablet by mouth Daily. TAke 2 tabs daily, Disp: , Rfl:        Objective   Vital Signs:   /82   Pulse 56   Ht 172.7 cm (68\")   Wt 97.5 kg (215 lb)   SpO2 97%   BMI 32.69 kg/m²     Physical Exam  Constitutional:       General: She is awake.   Eyes:      Extraocular Movements: Extraocular movements intact.   Neurological:      Mental Status: She is alert.   Psychiatric:         Speech: Speech normal.      Neurological Exam  Mental Status  Awake and alert. Speech is normal. Language is fluent with no aphasia.    Cranial Nerves  CN III, IV, VI: " Extraocular movements intact bilaterally.  CN VII: Full and symmetric facial movement.    Motor    Postural tremor with arms outstretched, somewhat jerky/dystonic  Tremor with FNF  Minimal rest tremor  Some jumping of feet/legs  Chin/Head tremor  No bradykinesia or rigidity .    Gait  Casual gait is normal including stance, stride, and arm swing.      Result Review :                     Assessment and Plan   64-year-old female referred for tremor, likely an essential tremor, especially as she has known family history of it. She had significant worsening after significant hospitalization last year, 2023, after a surgery in which she had hypoxic respiratory failure aside from the stress that it caused on her body.  Adding gabapentin helped significantly in addition to her metoprolol.  Tremors worsen previously after reducing metoprolol doses due to bradycardia.  Increased gabapentin at last visit which helped.  However patient was ill again and tremors worsen.  Primidone and Topamax are contraindicated with her amiodarone.  Tremors appear somewhat dystonic in nature so we discussed trialing a medicine like Artane.  Discussed side effects including significant sleepiness.    Plan:    1.  Continue gabapentin 600 mg twice daily.  2.  Start Artane 1 mg nightly then 1 mg twice daily.  Can increase to 2 mg twice daily if tolerating.  3.  Follow-up 3 months.        Follow Up   No follow-ups on file.  Patient was given instructions and counseling regarding her condition or for health maintenance advice. Please see specific information pulled into the AVS if appropriate.

## 2024-12-09 NOTE — TELEPHONE ENCOUNTER
Per [facility] sleep patient's split night sleep study was denied.  Insurance is requesting a home sleep study.     Patient qualified for nocturnal oxygen on the recent overnight pulse oximetry.

## 2024-12-18 ENCOUNTER — HOSPITAL ENCOUNTER (OUTPATIENT)
Dept: SLEEP CENTER | Age: 64
Discharge: HOME | End: 2024-12-20
Payer: COMMERCIAL

## 2024-12-18 PROCEDURE — G0399 HOME SLEEP TEST/TYPE 3 PORTA: HCPCS

## 2024-12-18 NOTE — PROGRESS NOTES
Pt in the office to  HST monitor.  Pt was educated on how to use equipment properly and instructed to wear for 2 nights and to return on Friday.  Pt states she understood.

## 2024-12-19 PROCEDURE — G0399 HOME SLEEP TEST/TYPE 3 PORTA: HCPCS

## 2024-12-26 DIAGNOSIS — G25.0 ESSENTIAL TREMOR: ICD-10-CM

## 2024-12-26 RX ORDER — GABAPENTIN 600 MG/1
600 TABLET ORAL 2 TIMES DAILY
Qty: 60 TABLET | Refills: 0 | Status: SHIPPED | OUTPATIENT
Start: 2024-12-26

## 2025-01-01 DIAGNOSIS — G47.33 OSA (OBSTRUCTIVE SLEEP APNEA): ICD-10-CM

## 2025-01-01 DIAGNOSIS — E66.01 MORBID OBESITY: Primary | ICD-10-CM

## 2025-01-02 ENCOUNTER — FOLLOWUP TELEPHONE ENCOUNTER (OUTPATIENT)
Dept: SLEEP CENTER | Age: 65
End: 2025-01-02

## 2025-01-02 NOTE — TELEPHONE ENCOUNTER
Called and spoke to patient and discussed results of the home sleep study.  Explained order for auto cpap.  Order for auto cpap was sent to [employer].  Home sleep study report was sent to the referring provider.

## 2025-02-12 ENCOUNTER — TELEPHONE (OUTPATIENT)
Dept: NEUROLOGY | Facility: CLINIC | Age: 65
End: 2025-02-12
Payer: COMMERCIAL

## 2025-02-12 NOTE — TELEPHONE ENCOUNTER
Spoke to pt regarding Dr. Bartholomew not being in the office on 3/10.    Offered pt appt at the end of this month, pt states they were doing ok and would like to hold off until April.     New appt made, understanding voiced, and will be mailed to pt.

## 2025-02-25 ENCOUNTER — OFFICE VISIT (OUTPATIENT)
Dept: PULMONOLOGY | Facility: CLINIC | Age: 65
End: 2025-02-25
Payer: COMMERCIAL

## 2025-02-25 VITALS
HEIGHT: 68 IN | WEIGHT: 225 LBS | DIASTOLIC BLOOD PRESSURE: 80 MMHG | OXYGEN SATURATION: 98 % | BODY MASS INDEX: 34.1 KG/M2 | HEART RATE: 52 BPM | SYSTOLIC BLOOD PRESSURE: 122 MMHG

## 2025-02-25 DIAGNOSIS — Z87.891 FORMER SMOKER: Chronic | ICD-10-CM

## 2025-02-25 DIAGNOSIS — G47.33 OBSTRUCTIVE SLEEP APNEA (ADULT) (PEDIATRIC): Primary | Chronic | ICD-10-CM

## 2025-02-25 DIAGNOSIS — J98.4 RESTRICTIVE LUNG DISEASE: Chronic | ICD-10-CM

## 2025-02-25 PROCEDURE — 99213 OFFICE O/P EST LOW 20 MIN: CPT | Performed by: NURSE PRACTITIONER

## 2025-02-25 NOTE — PROGRESS NOTES
"Chief Complaint  Restrictive lung disease    Subjective    History of Present Illness      Emili Schmitt presents to [facility] for:    History of Present Illness   Follow-up for restrictive lung disease.  When I saw her in October she was having some dyspnea but she reports that it has gotten much better and she now only experiences exertional shortness of breath when walking uphill and she is able to recover quickly.  She does not recall the last time that she required albuterol.  I sent her back to ENT and she saw Dr. Velázquez in November who reported that she has airway compromise at the supraglottic level in the base of the tongue.  She underwent an at home sleep study in December showing severe FRANCISCO JAVIER with hypoxia.  She is now on a CPAP device with oxygen and that is being managed by [employer].  She reports good compliance on her device.  She was treated for pneumonia in November.  I reviewed a CTA of the chest from 11/1/2024 showing some bilateral opacities in the lower lobes.  She feels that she recovered from that with no issues.  She has no new or worsening pulmonary complaints.  She will follow-up with pulmonology on an as-needed basis.  Objective   Vital Signs:   /80   Pulse 52   Ht 172.7 cm (68\")   Wt 102 kg (225 lb)   SpO2 98% Comment: RA  BMI 34.21 kg/m²     Physical Exam  Vitals reviewed. Exam conducted with a chaperone present.   Constitutional:       Appearance: She is well-developed and overweight.   HENT:      Head: Normocephalic and atraumatic.   Eyes:      Comments: Wears glasses   Neck:      Vascular: No carotid bruit.   Cardiovascular:      Rate and Rhythm: Normal rate and regular rhythm.   Pulmonary:      Effort: Pulmonary effort is normal.      Breath sounds: Normal breath sounds.   Abdominal:      General: There is no distension.   Musculoskeletal:         General: Normal range of motion.      Cervical back: Normal range " of motion and neck supple.   Skin:     General: Skin is warm and dry.   Neurological:      Mental Status: She is alert and oriented to person, place, and time.   Psychiatric:         Mood and Affect: Mood normal.        Result Review :   The following data was reviewed by: BHAVESH Willis on 02/25/2025:  CT Angiogram Chest (11/01/2024 22:37)   No results found for this or any previous visit.           Assessment and Plan      Diagnoses and all orders for this visit:    1. Obstructive sleep apnea (adult) (pediatric) (Primary)  Comments:  She has now on CPAP with oxygen and reports that she is doing well.  She has a follow-up with [facility] next month.    2. Restrictive lung disease  Comments:  Stable. She has not required any albuterol in many months. She is working on weight loss. She will continue utilizing CPAP and f/u with ENT. F/u with us PRN.    3. Former smoker  Comments:  She quit smoking in July 2023.  She does not qualify for LDCT.  Continue annual chest x-ray.      BHAVESH Willis  2/25/2025  08:52 CST    Follow Up   Return if symptoms worsen or fail to improve.    Patient was given instructions and counseling regarding her condition or for health maintenance advice. Please see specific information pulled into the AVS if appropriate.

## 2025-02-26 DIAGNOSIS — G25.0 ESSENTIAL TREMOR: ICD-10-CM

## 2025-02-26 NOTE — TELEPHONE ENCOUNTER
Caller: KeshiaEmili    Relationship: Self    Best call back number:   Telephone Information:   Mobile [phone]       Requested Prescriptions:   Requested Prescriptions     Pending Prescriptions Disp Refills    gabapentin (NEURONTIN) 600 MG tablet 60 tablet 0     Sig: Take 1 tablet by mouth 2 (Two) Times a Day.        Pharmacy where request should be sent: [id number] - [place], [place] - [address] AT [facility] [phone] [facility] [phone]      Last office visit with prescribing clinician: 12/9/2024   Last telemedicine visit with prescribing clinician: Visit date not found   Next office visit with prescribing clinician: 4/7/2025     Additional details provided by patient: PT STATES SHE ONLY HAS TWO TABLETS LEFT    Does the patient have less than a 3 day supply:  [x] Yes  [] No    Would you like a call back once the refill request has been completed: [] Yes [x] No    If the office needs to give you a call back, can they leave a voicemail: [] Yes [x] No    Omar Henley Rep   02/26/25 12:30 CST

## 2025-02-27 RX ORDER — GABAPENTIN 600 MG/1
600 TABLET ORAL 2 TIMES DAILY
Qty: 60 TABLET | Refills: 1 | Status: SHIPPED | OUTPATIENT
Start: 2025-02-27

## 2025-02-27 NOTE — TELEPHONE ENCOUNTER
Caller: Emili Schmitt    Relationship: Self    Best call back number: [phone]    What was the call regarding: PT CALLING TO CHECK THE STATUS OF HER GABAPENTIN REFILL REQUEST. I ADVISED THAT REQUEST HAS BEEN SENT AS HIGH-PRIORITY AND AWAITS APPROVAL. PT REPORTS SHE HAS JUST ONE TABLET OF THE GABAPENTIN LEFT.    Do you require a callback: IF NEEDED.    PLEASE REVIEW AND ADVISE.

## 2025-03-07 DIAGNOSIS — G24.9 DYSTONIA: ICD-10-CM

## 2025-03-10 RX ORDER — TRIHEXYPHENIDYL HYDROCHLORIDE 2 MG/1
2 TABLET ORAL 2 TIMES DAILY
Qty: 60 TABLET | Refills: 1 | Status: SHIPPED | OUTPATIENT
Start: 2025-03-10

## 2025-03-18 ENCOUNTER — OFFICE VISIT (OUTPATIENT)
Age: 65
End: 2025-03-18
Payer: COMMERCIAL

## 2025-03-18 ENCOUNTER — TELEPHONE (OUTPATIENT)
Age: 65
End: 2025-03-18

## 2025-03-18 VITALS — WEIGHT: 215 LBS | HEIGHT: 68 IN | BODY MASS INDEX: 32.58 KG/M2

## 2025-03-18 DIAGNOSIS — S42.031A CLOSED DISPLACED FRACTURE OF ACROMIAL END OF RIGHT CLAVICLE, INITIAL ENCOUNTER: Primary | ICD-10-CM

## 2025-03-18 PROCEDURE — 99204 OFFICE O/P NEW MOD 45 MIN: CPT | Performed by: PHYSICIAN ASSISTANT

## 2025-03-18 RX ORDER — AMIODARONE HYDROCHLORIDE 200 MG/1
200 TABLET ORAL DAILY
COMMUNITY

## 2025-03-18 RX ORDER — GABAPENTIN 600 MG/1
600 TABLET ORAL 3 TIMES DAILY
COMMUNITY

## 2025-03-18 RX ORDER — METOPROLOL SUCCINATE 25 MG/1
25 TABLET, EXTENDED RELEASE ORAL DAILY
COMMUNITY

## 2025-03-18 RX ORDER — PANTOPRAZOLE SODIUM 20 MG/1
20 TABLET, DELAYED RELEASE ORAL DAILY
COMMUNITY

## 2025-03-18 RX ORDER — TRIHEXYPHENIDYL HYDROCHLORIDE 2 MG/1
2 TABLET ORAL 3 TIMES DAILY
COMMUNITY

## 2025-03-18 RX ORDER — OXYCODONE AND ACETAMINOPHEN 10; 325 MG/1; MG/1
1 TABLET ORAL EVERY 4 HOURS PRN
Qty: 42 TABLET | Refills: 0 | Status: SHIPPED | OUTPATIENT
Start: 2025-03-18 | End: 2025-03-25

## 2025-03-18 RX ORDER — KETOROLAC TROMETHAMINE 10 MG/1
10 TABLET, FILM COATED ORAL EVERY 6 HOURS PRN
COMMUNITY

## 2025-03-18 NOTE — TELEPHONE ENCOUNTER
Chata terri [birth year]   [facility] xrays  Right collar bone fracture   No wc no mva no sx   Gary on call     Patient states [facility] made an appointment for her today at 11 to see Gary. There is nothing in her chart. Patient needs an appointment for collar bone fracture

## 2025-03-18 NOTE — PROGRESS NOTES
Orthopaedic History and Physical - New Patient    NAME:  Clau Moreno   : 1960  MRN: [id number]      3/18/2025     CHIEF COMPLAINT: Right clavicle fracture status post fall    HISTORY OF PRESENT ILLNESS:   This is a pleasant 64-year-old female who comes in as a new patient for a right clavicle fracture after a fall.  Patient dates her dog tripped.  She fell onto a door frame injuring her right shoulder.  She went to an outlying facility where x-rays revealed a right clavicle fracture.  Patient is here now for further evaluation treatment options.  Pain is constant, moderate, sharp piercing throbbing achy.      Past Medical History:    No past medical history on file.    Past Surgical History:    No past surgical history on file.    Current Medications:   Prior to Admission medications    Medication Sig Start Date End Date Taking? Authorizing Provider   pantoprazole (PROTONIX) 20 MG tablet Take 1 tablet by mouth daily   Yes Taylor Robbins MD   amiodarone (CORDARONE) 200 MG tablet Take 1 tablet by mouth daily   Yes Taylor Robbins MD   gabapentin (NEURONTIN) 600 MG tablet Take 1 tablet by mouth 3 times daily. Max Daily Amount: 1,800 mg   Yes ProviderTaylor MD   trihexyphenidyl (ARTANE) 2 MG tablet Take 1 tablet by mouth 3 times daily   Yes Taylor Robbins MD   apixaban (ELIQUIS) 2.5 MG TABS tablet Take by mouth 2 times daily   Yes Taylor Robbins MD   metoprolol succinate (TOPROL XL) 25 MG extended release tablet Take 1 tablet by mouth daily   Yes Taylor Robbins MD   ketorolac (TORADOL) 10 MG tablet Take 1 tablet by mouth every 6 hours as needed for Pain   Yes ProviderTaylor MD       Allergies:  Hydrocodone    Social History:   Social History     Socioeconomic History    Marital status: Unknown     Spouse name: Not on file    Number of children: Not on file    Years of education: Not on file    Highest education level: Not on file   Occupational History    Not on

## 2025-04-01 ENCOUNTER — OFFICE VISIT (OUTPATIENT)
Age: 65
End: 2025-04-01

## 2025-04-01 DIAGNOSIS — S42.031A CLOSED DISPLACED FRACTURE OF ACROMIAL END OF RIGHT CLAVICLE, INITIAL ENCOUNTER: Primary | ICD-10-CM

## 2025-04-01 PROCEDURE — 99213 OFFICE O/P EST LOW 20 MIN: CPT | Performed by: PHYSICIAN ASSISTANT

## 2025-04-01 NOTE — PROGRESS NOTES
Orthopaedic Clinic Note - Established Patient    NAME:  Clau Moreno   : 1960  MRN: [id number]      2025      CHIEF COMPLAINT: Follow-up right clavicle fracture      HISTORY OF PRESENT ILLNESS:   This is a pleasant 64-year-old female who comes in as a new patient for a right clavicle fracture after a fall. Patient dates her dog tripped. She fell onto a door frame injuring her right shoulder. She went to an outlying facility where x-rays revealed a right clavicle fracture. Patient is here now for further evaluation treatment options. Pain is constant, moderate, sharp piercing throbbing achy.     The above note copied from date of service 3/18/2025:    Patient comes in today stating that we no longer take her insurance.  She just wanted clarification on activity with her recent fall resulting in a clavicle fracture.  She states her pain is minimal.  She reports her range of motion is pretty good.  No new issues today.    Past Medical History:    No past medical history on file.    Past Surgical History:    No past surgical history on file.    Current Medications:   Prior to Admission medications    Medication Sig Start Date End Date Taking? Authorizing Provider   pantoprazole (PROTONIX) 20 MG tablet Take 1 tablet by mouth daily   Yes Taylor Robbins MD   amiodarone (CORDARONE) 200 MG tablet Take 1 tablet by mouth daily   Yes Taylor Robbins MD   gabapentin (NEURONTIN) 600 MG tablet Take 1 tablet by mouth 3 times daily.   Yes Taylor Robbins MD   trihexyphenidyl (ARTANE) 2 MG tablet Take 1 tablet by mouth 3 times daily   Yes Taylor Robbins MD   apixaban (ELIQUIS) 2.5 MG TABS tablet Take by mouth 2 times daily   Yes Taylor Robbins MD   metoprolol succinate (TOPROL XL) 25 MG extended release tablet Take 1 tablet by mouth daily   Yes Taylor Robbins MD   ketorolac (TORADOL) 10 MG tablet Take 1 tablet by mouth every 6 hours as needed for Pain   Yes Taylor Robbins MD

## 2025-04-07 ENCOUNTER — OFFICE VISIT (OUTPATIENT)
Dept: NEUROLOGY | Facility: CLINIC | Age: 65
End: 2025-04-07
Payer: COMMERCIAL

## 2025-04-07 VITALS
SYSTOLIC BLOOD PRESSURE: 124 MMHG | OXYGEN SATURATION: 97 % | WEIGHT: 215 LBS | BODY MASS INDEX: 32.58 KG/M2 | HEART RATE: 66 BPM | HEIGHT: 68 IN | DIASTOLIC BLOOD PRESSURE: 70 MMHG

## 2025-04-07 DIAGNOSIS — G24.9 DYSTONIA: ICD-10-CM

## 2025-04-07 DIAGNOSIS — G25.0 ESSENTIAL TREMOR: ICD-10-CM

## 2025-04-07 PROCEDURE — 99214 OFFICE O/P EST MOD 30 MIN: CPT | Performed by: PSYCHIATRY & NEUROLOGY

## 2025-04-07 RX ORDER — KETOROLAC TROMETHAMINE 10 MG/1
1 TABLET, FILM COATED ORAL EVERY 6 HOURS PRN
COMMUNITY
End: 2025-04-07

## 2025-04-07 RX ORDER — OXYCODONE AND ACETAMINOPHEN 5; 325 MG/1; MG/1
1 TABLET ORAL EVERY 6 HOURS PRN
COMMUNITY

## 2025-04-07 RX ORDER — TRIHEXYPHENIDYL HYDROCHLORIDE 2 MG/1
2 TABLET ORAL 2 TIMES DAILY
Qty: 60 TABLET | Refills: 5 | Status: SHIPPED | OUTPATIENT
Start: 2025-04-07 | End: 2025-10-04

## 2025-04-07 RX ORDER — GABAPENTIN 600 MG/1
600 TABLET ORAL 2 TIMES DAILY
Qty: 60 TABLET | Refills: 1 | Status: SHIPPED | OUTPATIENT
Start: 2025-04-07

## 2025-04-07 NOTE — PROGRESS NOTES
"  Subjective        Emili Schmitt presents to [facility]    History of Present Illness  65 yo female here for f/u. Had significant improvement with Artane. No sleepiness. Broke collarbone about a month ago.                Current Outpatient Medications:     albuterol sulfate HFA (ProAir HFA) 108 (90 Base) MCG/ACT inhaler, Inhale 2 inhalations 4 times a day by inhalation route as needed., Disp: , Rfl:     ALPRAZolam (XANAX) 0.25 MG tablet, Take 1 tablet by mouth At Night As Needed., Disp: , Rfl:     amiodarone (PACERONE) 200 MG tablet, Take 1 tablet by mouth Daily., Disp: , Rfl:     apixaban (ELIQUIS) 5 MG tablet tablet, Take 1 tablet by mouth 2 (Two) Times a Day., Disp: , Rfl:     gabapentin (NEURONTIN) 600 MG tablet, Take 1 tablet by mouth 2 (Two) Times a Day., Disp: 60 tablet, Rfl: 1    metoprolol succinate XL (TOPROL-XL) 50 MG 24 hr tablet, Take 0.5 tablets by mouth Daily., Disp: , Rfl:     ondansetron ODT (ZOFRAN-ODT) 4 MG disintegrating tablet, Place 1 tablet on the tongue Daily., Disp: , Rfl:     oxyCODONE-acetaminophen (PERCOCET) 5-325 MG per tablet, Take 1 tablet by mouth Every 6 (Six) Hours As Needed., Disp: , Rfl:     pantoprazole (PROTONIX) 40 MG EC tablet, Take 1 tablet by mouth Daily., Disp: , Rfl:     sennosides-docusate (senna-docusate sodium) 8.6-50 MG per tablet, Take 1 tablet by mouth Daily. TAke 2 tabs daily, Disp: , Rfl:     trihexyphenidyl (ARTANE) 2 MG tablet, Take 1 tablet by mouth 2 (Two) Times a Day., Disp: 60 tablet, Rfl: 1       Objective   Vital Signs:   /70   Pulse 66   Ht 172.7 cm (68\")   Wt 97.5 kg (215 lb)   SpO2 97%   BMI 32.69 kg/m²     Physical Exam  Constitutional:       General: She is awake.   Eyes:      Extraocular Movements: Extraocular movements intact.   Neurological:      Mental Status: She is alert.   Psychiatric:         Speech: Speech normal.      Neurological Exam  Mental Status  Awake and alert. Speech is normal. Language " is fluent with no aphasia.    Cranial Nerves  CN III, IV, VI: Extraocular movements intact bilaterally.  CN VII: Full and symmetric facial movement.    Motor    Postural tremor with arms outstretched, somewhat jerky/dystonic  Tremor with FNF  Minimal rest tremor  Some jumping of feet/legs  Chin/Head tremor  No bradykinesia or rigidity .    Gait  Casual gait is normal including stance, stride, and arm swing.      Result Review :                     Assessment and Plan   64-year-old female referred for tremor, likely an essential tremor, especially as she has known family history of it. She had significant worsening after significant hospitalization last year, 2023, after a surgery in which she had hypoxic respiratory failure.  Adding gabapentin helped significantly in addition to her metoprolol.  Tremors worsen previously after reducing metoprolol doses due to bradycardia.  Increased gabapentin at last visit which helped.  However patient was ill again and tremors worsen.  Primidone and Topamax are contraindicated with her amiodarone.  Artane started last visit which has helped.     Plan:    1.  Continue gabapentin 600 mg twice daily.  2.  Continue Artane 2 mg BID.   3.  Follow-up 6 months.        Follow Up   No follow-ups on file.  Patient was given instructions and counseling regarding her condition or for health maintenance advice. Please see specific information pulled into the AVS if appropriate.

## 2025-05-08 ENCOUNTER — OFFICE VISIT (OUTPATIENT)
Dept: OTOLARYNGOLOGY | Facility: CLINIC | Age: 65
End: 2025-05-08
Payer: COMMERCIAL

## 2025-05-08 VITALS
WEIGHT: 222 LBS | SYSTOLIC BLOOD PRESSURE: 128 MMHG | DIASTOLIC BLOOD PRESSURE: 74 MMHG | HEIGHT: 68 IN | TEMPERATURE: 98.2 F | HEART RATE: 68 BPM | BODY MASS INDEX: 33.65 KG/M2

## 2025-05-08 DIAGNOSIS — K21.9 GASTROESOPHAGEAL REFLUX DISEASE WITHOUT ESOPHAGITIS: ICD-10-CM

## 2025-05-08 DIAGNOSIS — J39.8 TRACHEAL COMPRESSION: ICD-10-CM

## 2025-05-08 DIAGNOSIS — R06.00 DYSPNEA, UNSPECIFIED TYPE: ICD-10-CM

## 2025-05-08 DIAGNOSIS — J98.8 AIRWAY COMPROMISE: Primary | ICD-10-CM

## 2025-05-08 DIAGNOSIS — K14.8 ACQUIRED MACROGLOSSIA: ICD-10-CM

## 2025-05-08 DIAGNOSIS — J35.1 LINGUAL TONSIL HYPERTROPHY: ICD-10-CM

## 2025-05-08 DIAGNOSIS — G25.0 ESSENTIAL TREMOR: ICD-10-CM

## 2025-05-08 DIAGNOSIS — J30.9 ALLERGIC RHINITIS, UNSPECIFIED SEASONALITY, UNSPECIFIED TRIGGER: ICD-10-CM

## 2025-05-08 NOTE — PATIENT INSTRUCTIONS
Wear CPCP  See Primary care for CPAP maintenance    >NASAL SALINE:  Use 2 puffs each nostril 4-6 times daily and more frequently if possible.  You can buy saline spray or you can make your own and use an old spray bottle to administer  Use a humidifier at bedside  Recipe for saline:  Water                                 1 quart  Salt (table)                        1 tablespoon  Gylcerin (or Celine Syrup)    1 teaspoon  Sodium bicarbonate           1 teaspoon  Sprays or Little pots are recommended    Do not allow to stand for more than 24 hrs. Make new solution. There is no preservative in this solution.    Sinus irrigation and saline application can be enhanced with various over-the-counter products.  A WaterPik can be quite useful to irrigate, especially following sinus surgery.  Navage makes a product that irrigates the nose and some of the sinuses.  NeilMed makes a Sinu-Gator to irrigate the sinuses.  Neomed also has canned saline that we will come out under pressure.  A Little pot can also be helpful.  All of these products help keep the nose clear of debris.  Please use as directed on the instructions that come with the particular device.     >NASAL STEROID use:  Using nasal steroids:  You will be prescribed one of the following nasal steroids: Flonase, Nasacort, Nasonex, Rhinocort, Qnasl, Zetonna  2 puffs each nostril 2 times daily  Start as soon as possible  If you are using Afrin for 3 days with the nasal steroid,  Use Afrin first and wait 10 minutes to allow the nose to open. Then administer nasal steroids.     Diet  Exercise    Call for problems     CONTACT INFORMATION:  The main office phone number is [phone]. For emergencies after hours and on weekends, this number will convert over to our answering service and the on call provider will answer. Please try to keep non emergent phone calls/ questions to office hours 9am-5pm Monday through Friday.     [employer]  As an alternative, you can sign up and use  the Epic MyChart system for more direct and quicker access for non emergent questions/ problems.  [facility] [employer]ness allows you to send messages to your doctor, view your test results, renew your prescriptions, schedule appointments, and more. To sign up, go to [employer] and click on the Sign Up Now link in the New User? box. Enter your G[employer]ness Activation Code exactly as it appears below along with the last four digits of your Social Security Number and your Date of Birth () to complete the sign-up process. If you do not sign up before the expiration date, you must request a new code.    [employer]ness Activation Code: Activation code not generated  Current Gritness Status: Active    If you have questions, you can email [email or web address] or call [phone] to talk to our [employer]ness staff. Remember, [employer] is NOT to be used for urgent needs. For medical emergencies, dial 911.

## 2025-05-08 NOTE — PROGRESS NOTES
Jef Velázquez Jr, MD  [facility] EAR [facility]SE & THROAT  [address], [address]  [place] [facility] [address]  Fax [phone]  Phone [phone]      Visit Type: FOLLOW UP   Chief Complaint   Patient presents with    Recheck airway     Her breathing has improved, she is not using inhaler anymore           HISTORY OBTAINED FROM: patient  Accompanied by:No one  HPI  She presents for a follow up evaluation. She has had fx collar bone.  She is using CPAP now.  She is on CPAP.  Had home sleep study.   Has allergy and sneezing. Using Flonase  She is breathing better overall. Has stopped inhalers.    History of Present Illness      Past Medical History:   Diagnosis Date    Arthritis     Atrial fibrillation     Carotid artery occlusion     Hyperlipidemia     Interstitial cystitis     Macular dystrophy     Neuropathy     Peripheral neuropathy     Restless leg syndrome        Past Surgical History:   Procedure Laterality Date    APPENDECTOMY      BREAST CYST ASPIRATION Left     HERNIA REPAIR      HYSTERECTOMY      [facility] unsure if she has tubes due to hemmorage after birth    OTHER SURGICAL HISTORY      TIF surgery       Family History: Her family history includes Neuropathy in her father.     Social History: She  reports that she quit smoking about 21 months ago. Her smoking use included cigarettes. She started smoking about 34 years ago. She has a 25 pack-year smoking history. She has never been exposed to tobacco smoke. She has never used smokeless tobacco. She reports that she does not drink alcohol and does not use drugs.    Home Medications:  ALPRAZolam, albuterol sulfate HFA, amiodarone, apixaban, gabapentin, metoprolol succinate XL, ondansetron ODT, oxyCODONE-acetaminophen, pantoprazole, sennosides-docusate, and trihexyphenidyl    Allergies:  She is allergic to hydrocodone, propranolol, and tylenol [acetaminophen].       Vital Signs:   Temp:  [98.2 °F (36.8 °C)] 98.2  °F (36.8 °C)  Heart Rate:  [68] 68  BP: (128)/(74) 128/74  ENT Physical Exam  Constitutional  Appearance: patient appears well-developed, obesity noted, patient is cooperative;  Communication/Voice: communication appropriate for developmental age; vocal quality normal;  Head and Face  Appearance: head appears normal, face appears normal and face appears atraumatic;  Palpation: facial palpation normal;  Salivary: glands normal;  Ear  Hearing: intact;  Auricles: bilateral auricles normal;  External Mastoids: right external mastoid normal; left external mastoid normal;  Ear Canals: bilateral ear canals normal;  Tympanic Membranes: bilateral tympanic membranes normal;  Nose  External Nose: nares patent bilaterally; external nose normal;  Internal Nose: nasal mucosa normal; nasal septal deviation present; deviation is to the right, septal deviation is intermediate; bilateral inferior turbinates normal;  Oral Cavity/Oropharynx  Lips: normal;  Teeth: normal;  Gums: gingiva normal;  Tongue: tongue macroglossia present; Carrasquillo III position; Oral Tongue comments: mod prolapse  Oral mucosa: normal;  Hard palate: normal;  Soft palate: normal;  Tonsils: normal;  Base of Tongue: normal;  Posterior pharyngeal wall: normal;  Neck  Neck: scars (trach) and large neck circumference present; neck palpation normal;  Thyroid: thyroid normal;  Neck comments: Short, thick  Respiratory  Inspection: breathing unlabored; normal breathing rate;  Cardiovascular  Inspection: extremities are warm and well perfused; no peripheral edema present;  Lymphatic  Palpation: lymph nodes normal;  Neurovestibular  Mental Status: alert and oriented;  Psychiatric: mood normal; affect is appropriate;           Result Review       RESULTS REVIEW    I have reviewed the patients old records in the chart.   I have reviewed the patients old records in the chart.  The following results/records were reviewed:   SLEEP STUDY EXTERNAL - SCAN - HOME SLEEP STUDY-[facility]  (12/18/2024)    SLEEP STUDY EXTERNAL - SCAN - HOME SLEEP STUDY-[facility] (12/19/2024)         Assessment & Plan  Airway compromise  Improving  Dyspnea, unspecified type  Improving  Tracheal compression  Improving  Lingual tonsil hypertrophy  Stable  Acquired macroglossia  Stable  Gastroesophageal reflux disease without esophagitis  Improved  Essential tremor    Allergic rhinitis, unspecified seasonality, unspecified trigger  Partially controlled with              Assessment & Plan      Medical and surgical options were discussed including observation, continued medical management, medication modification, and surgical management. Risks, benefits and alternatives were discussed and questions were answered. After considering the options, the patient decided to proceed with continued medical management.  Patient appears to be doing much better.  I do not feel she requires any further evaluation of her trachea at this point in time.  Overall, she is better.  I feel if she obtains control of her sleep apnea, she will do better.  I have encouraged her to diet, exercise, reflux, wear CPAP.  She also has some mild allergy exacerbation.  I will have her continue Flonase twice daily and nasal saline.  She is to call for any allergy issues.  I will see her back in 3 months.  If she is still having some issues with her CPAP, I will refer her for inspire placement.  Wear CPAP  Flonase twice daily  Nasal saline  Diet  Exercise  Call for problems    My Chart:  Patient is using My Chart    Patient understand(s) and agree(s) with the treatment plan as described.      Return in about 3 months (around 8/8/2025) for Recheck.        Electronically signed by Jef Velázquez Jr, MD 05/08/25 1:56 PM CDT.

## 2025-06-30 DIAGNOSIS — G25.0 ESSENTIAL TREMOR: ICD-10-CM

## 2025-06-30 RX ORDER — GABAPENTIN 600 MG/1
600 TABLET ORAL 2 TIMES DAILY
Qty: 60 TABLET | Refills: 1 | Status: SHIPPED | OUTPATIENT
Start: 2025-06-30

## 2025-06-30 NOTE — TELEPHONE ENCOUNTER
Caller: Emili Schmitt    Relationship: Self    Best call back number: [phone]    Requested Prescriptions:   Requested Prescriptions     Pending Prescriptions Disp Refills    gabapentin (NEURONTIN) 600 MG tablet 60 tablet 1     Sig: Take 1 tablet by mouth 2 (Two) Times a Day.      Pharmacy where request should be sent: [id number] - [place], [place] - [address] AT [facility] [phone] [facility] [phone]      Last office visit with prescribing clinician: 4/7/2025   Last telemedicine visit with prescribing clinician: Visit date not found   Next office visit with prescribing clinician: 10/13/2025 EVER/ BHAVESH TRAVIS     Additional details provided by patient: PT REPORTS SHE HAS JUST 3 GABAPENTIN TABLETS. SHE WILL BE COMPLETELY OUT BY WEDNESDAY MORNING.    Does the patient have less than a 3 day supply?:  [x] Yes  [] No    Would you like a call back once the refill request has been completed?: [] Yes  [x] No    If the office needs to give you a call back, can they leave a voicemail?: [] Yes  [x] No    PLEASE REVIEW AND ADVISE.    Omar Rajput Rep   06/30/25 13:23 CDT